# Patient Record
Sex: MALE | Race: WHITE | NOT HISPANIC OR LATINO | Employment: FULL TIME | URBAN - METROPOLITAN AREA
[De-identification: names, ages, dates, MRNs, and addresses within clinical notes are randomized per-mention and may not be internally consistent; named-entity substitution may affect disease eponyms.]

---

## 2017-08-18 ENCOUNTER — APPOINTMENT (OUTPATIENT)
Dept: CT IMAGING | Facility: CLINIC | Age: 42
End: 2017-08-18
Attending: EMERGENCY MEDICINE
Payer: COMMERCIAL

## 2017-08-18 ENCOUNTER — HOSPITAL ENCOUNTER (OUTPATIENT)
Facility: CLINIC | Age: 42
Setting detail: OBSERVATION
Discharge: HOME | End: 2017-08-19
Attending: EMERGENCY MEDICINE | Admitting: INTERNAL MEDICINE
Payer: COMMERCIAL

## 2017-08-18 DIAGNOSIS — Z79.4 ENCOUNTER FOR LONG-TERM (CURRENT) USE OF INSULIN (H): ICD-10-CM

## 2017-08-18 DIAGNOSIS — R73.9 HYPERGLYCEMIA: ICD-10-CM

## 2017-08-18 LAB
ALBUMIN SERPL-MCNC: 3 G/DL (ref 3.4–5)
ALBUMIN UR-MCNC: NEGATIVE MG/DL
ALP SERPL-CCNC: 66 U/L (ref 40–150)
ALT SERPL W P-5'-P-CCNC: 26 U/L (ref 0–70)
ANION GAP SERPL CALCULATED.3IONS-SCNC: 7 MMOL/L (ref 3–14)
APPEARANCE UR: ABNORMAL
AST SERPL W P-5'-P-CCNC: 7 U/L (ref 0–45)
BASOPHILS # BLD AUTO: 0 10E9/L (ref 0–0.2)
BASOPHILS NFR BLD AUTO: 0.4 %
BILIRUB SERPL-MCNC: 0.3 MG/DL (ref 0.2–1.3)
BILIRUB UR QL STRIP: NEGATIVE
BUN SERPL-MCNC: 18 MG/DL (ref 7–30)
CALCIUM SERPL-MCNC: 7.2 MG/DL (ref 8.5–10.1)
CHLORIDE SERPL-SCNC: 109 MMOL/L (ref 94–109)
CO2 BLDCOV-SCNC: 26 MMOL/L (ref 21–28)
CO2 SERPL-SCNC: 25 MMOL/L (ref 20–32)
COLOR UR AUTO: ABNORMAL
CREAT SERPL-MCNC: 0.97 MG/DL (ref 0.66–1.25)
DIFFERENTIAL METHOD BLD: ABNORMAL
EOSINOPHIL # BLD AUTO: 0.1 10E9/L (ref 0–0.7)
EOSINOPHIL NFR BLD AUTO: 0.8 %
ERYTHROCYTE [DISTWIDTH] IN BLOOD BY AUTOMATED COUNT: 14.1 % (ref 10–15)
GFR SERPL CREATININE-BSD FRML MDRD: 84 ML/MIN/1.7M2
GLUCOSE BLDC GLUCOMTR-MCNC: 150 MG/DL (ref 70–99)
GLUCOSE BLDC GLUCOMTR-MCNC: 307 MG/DL (ref 70–99)
GLUCOSE SERPL-MCNC: 256 MG/DL (ref 70–99)
GLUCOSE UR STRIP-MCNC: >1000 MG/DL
HCT VFR BLD AUTO: 42.7 % (ref 40–53)
HGB BLD-MCNC: 14.3 G/DL (ref 13.3–17.7)
HGB UR QL STRIP: NEGATIVE
IMM GRANULOCYTES # BLD: 0 10E9/L (ref 0–0.4)
IMM GRANULOCYTES NFR BLD: 0.2 %
KETONES UR STRIP-MCNC: 40 MG/DL
LACTATE BLD-SCNC: 0.5 MMOL/L (ref 0.7–2.1)
LEUKOCYTE ESTERASE UR QL STRIP: NEGATIVE
LIPASE SERPL-CCNC: 99 U/L (ref 73–393)
LYMPHOCYTES # BLD AUTO: 0.9 10E9/L (ref 0.8–5.3)
LYMPHOCYTES NFR BLD AUTO: 8.4 %
MCH RBC QN AUTO: 28.5 PG (ref 26.5–33)
MCHC RBC AUTO-ENTMCNC: 33.5 G/DL (ref 31.5–36.5)
MCV RBC AUTO: 85 FL (ref 78–100)
MONOCYTES # BLD AUTO: 0.5 10E9/L (ref 0–1.3)
MONOCYTES NFR BLD AUTO: 4.5 %
MUCOUS THREADS #/AREA URNS LPF: PRESENT /LPF
NEUTROPHILS # BLD AUTO: 8.7 10E9/L (ref 1.6–8.3)
NEUTROPHILS NFR BLD AUTO: 85.7 %
NITRATE UR QL: NEGATIVE
NRBC # BLD AUTO: 0 10*3/UL
NRBC BLD AUTO-RTO: 0 /100
PCO2 BLDV: 48 MM HG (ref 40–50)
PH BLDV: 7.35 PH (ref 7.32–7.43)
PH UR STRIP: 5.5 PH (ref 5–7)
PLATELET # BLD AUTO: 271 10E9/L (ref 150–450)
PO2 BLDV: 23 MM HG (ref 25–47)
POTASSIUM SERPL-SCNC: 3.8 MMOL/L (ref 3.4–5.3)
PROT SERPL-MCNC: 5.8 G/DL (ref 6.8–8.8)
RBC # BLD AUTO: 5.02 10E12/L (ref 4.4–5.9)
RBC #/AREA URNS AUTO: 0 /HPF (ref 0–2)
SAO2 % BLDV FROM PO2: 36 %
SODIUM SERPL-SCNC: 140 MMOL/L (ref 133–144)
SOURCE: ABNORMAL
SP GR UR STRIP: 1.02 (ref 1–1.03)
SQUAMOUS #/AREA URNS AUTO: <1 /HPF (ref 0–1)
UROBILINOGEN UR STRIP-MCNC: NORMAL MG/DL (ref 0–2)
WBC # BLD AUTO: 10.1 10E9/L (ref 4–11)
WBC #/AREA URNS AUTO: <1 /HPF (ref 0–2)

## 2017-08-18 PROCEDURE — 25000132 ZZH RX MED GY IP 250 OP 250 PS 637: Performed by: EMERGENCY MEDICINE

## 2017-08-18 PROCEDURE — 83605 ASSAY OF LACTIC ACID: CPT

## 2017-08-18 PROCEDURE — 96375 TX/PRO/DX INJ NEW DRUG ADDON: CPT

## 2017-08-18 PROCEDURE — 74177 CT ABD & PELVIS W/CONTRAST: CPT

## 2017-08-18 PROCEDURE — 96374 THER/PROPH/DIAG INJ IV PUSH: CPT

## 2017-08-18 PROCEDURE — 00000146 ZZHCL STATISTIC GLUCOSE BY METER IP

## 2017-08-18 PROCEDURE — 99285 EMERGENCY DEPT VISIT HI MDM: CPT | Mod: Z6 | Performed by: EMERGENCY MEDICINE

## 2017-08-18 PROCEDURE — 83690 ASSAY OF LIPASE: CPT | Performed by: EMERGENCY MEDICINE

## 2017-08-18 PROCEDURE — 80048 BASIC METABOLIC PNL TOTAL CA: CPT | Performed by: EMERGENCY MEDICINE

## 2017-08-18 PROCEDURE — 83036 HEMOGLOBIN GLYCOSYLATED A1C: CPT | Performed by: EMERGENCY MEDICINE

## 2017-08-18 PROCEDURE — 85025 COMPLETE CBC W/AUTO DIFF WBC: CPT | Performed by: EMERGENCY MEDICINE

## 2017-08-18 PROCEDURE — 25000128 H RX IP 250 OP 636: Performed by: RADIOLOGY

## 2017-08-18 PROCEDURE — 81001 URINALYSIS AUTO W/SCOPE: CPT | Performed by: EMERGENCY MEDICINE

## 2017-08-18 PROCEDURE — 99285 EMERGENCY DEPT VISIT HI MDM: CPT | Mod: 25

## 2017-08-18 PROCEDURE — 80053 COMPREHEN METABOLIC PANEL: CPT | Performed by: EMERGENCY MEDICINE

## 2017-08-18 PROCEDURE — 96361 HYDRATE IV INFUSION ADD-ON: CPT | Mod: 59

## 2017-08-18 PROCEDURE — 87086 URINE CULTURE/COLONY COUNT: CPT | Performed by: EMERGENCY MEDICINE

## 2017-08-18 PROCEDURE — 25000128 H RX IP 250 OP 636: Performed by: EMERGENCY MEDICINE

## 2017-08-18 PROCEDURE — 82803 BLOOD GASES ANY COMBINATION: CPT

## 2017-08-18 RX ORDER — SODIUM CHLORIDE 9 MG/ML
1000 INJECTION, SOLUTION INTRAVENOUS CONTINUOUS
Status: DISCONTINUED | OUTPATIENT
Start: 2017-08-18 | End: 2017-08-18

## 2017-08-18 RX ORDER — ONDANSETRON 2 MG/ML
4 INJECTION INTRAMUSCULAR; INTRAVENOUS EVERY 6 HOURS PRN
Status: DISCONTINUED | OUTPATIENT
Start: 2017-08-18 | End: 2017-08-19 | Stop reason: HOSPADM

## 2017-08-18 RX ORDER — IOPAMIDOL 755 MG/ML
116 INJECTION, SOLUTION INTRAVASCULAR ONCE
Status: COMPLETED | OUTPATIENT
Start: 2017-08-18 | End: 2017-08-18

## 2017-08-18 RX ORDER — LIDOCAINE 40 MG/G
CREAM TOPICAL
Status: DISCONTINUED | OUTPATIENT
Start: 2017-08-18 | End: 2017-08-19 | Stop reason: HOSPADM

## 2017-08-18 RX ADMIN — ONDANSETRON 4 MG: 2 INJECTION INTRAMUSCULAR; INTRAVENOUS at 21:14

## 2017-08-18 RX ADMIN — IOPAMIDOL 116 ML: 755 INJECTION, SOLUTION INTRAVENOUS at 21:20

## 2017-08-18 RX ADMIN — OMEPRAZOLE 20 MG: 20 CAPSULE, DELAYED RELEASE ORAL at 23:39

## 2017-08-18 RX ADMIN — HUMAN INSULIN 6 UNITS: 100 INJECTION, SOLUTION SUBCUTANEOUS at 22:23

## 2017-08-18 RX ADMIN — SODIUM CHLORIDE 1000 ML: 9 INJECTION, SOLUTION INTRAVENOUS at 19:44

## 2017-08-18 RX ADMIN — SODIUM CHLORIDE 1000 ML: 9 INJECTION, SOLUTION INTRAVENOUS at 22:23

## 2017-08-18 ASSESSMENT — ENCOUNTER SYMPTOMS
NECK STIFFNESS: 0
DIFFICULTY URINATING: 0
NAUSEA: 1
DIAPHORESIS: 1
HEADACHES: 1
CONFUSION: 0
SHORTNESS OF BREATH: 0
EYE REDNESS: 0
DIZZINESS: 1
COLOR CHANGE: 0
CHILLS: 1
ARTHRALGIAS: 0
VOMITING: 1
ABDOMINAL PAIN: 1
FEVER: 0

## 2017-08-18 NOTE — IP AVS SNAPSHOT
[facility] [address] Observation [address] [address]    Phone:  [phone]    Fax:  [phone]                                       After Visit Summary   8/18/2017    Israel Bronw    MRN: [id number]           After Visit Summary Signature Page     I have received my discharge instructions, and my questions have been answered. I have discussed any challenges I see with this plan with the nurse or doctor.    ..........................................................................................................................................  Patient/Patient Representative Signature      ..........................................................................................................................................  Patient Representative Print Name and Relationship to Patient    ..................................................               ................................................  Date                                            Time    ..........................................................................................................................................  Reviewed by Signature/Title    ...................................................              ..............................................  Date                                                            Time

## 2017-08-18 NOTE — ED NOTES
Pt comes in with lower abdominal pain that started suddenly this afternoon after an episode of vomiting. Pt says after the episode of vomiting he began to feel sweaty and dizzy, those symptoms have since resolved but pain and nausea persist. Pt also thinks he may be a little constipated. Alert and oriented, vss.

## 2017-08-18 NOTE — ED PROVIDER NOTES
[facility] EMERGENCY DEPARTMENT ([facility])  8/18/17 ED 10 7:05 PM   History     Chief Complaint   Patient presents with     Abdominal Pain     The history is provided by the patient, the spouse and medical records.     Israel Brown is a 41 year old male who presents with lower abdominal pain as well as 1 episode of vomiting and diarrhea. He has a history of diabetes, asthma, GERD, RA, and neuropathy.  Patient states symptoms started at 4pm today. He went to eat at [email or web address] today. Afterwards he developed sudden onset of severe crampy abdominal pain. He went to a gas station bathroom and vomited some pinkish emesis. He did not eat any pink colored food and has not had any further episodes of vomiting. He also had a small amount of soft and watery diarrhea with this, brown normal stool color. He notes that sometimes if he holds his stool or urine he gets abdominal cramping, but had no relief after stooling and urinating. Last bowel movement was after symptoms started, at around 4:30 PM.  The pain is focal in the entire lower abdomen, constant and unrelenting. He rates it a 6.5/10 in intensity, nonradiating; specifically no radiation of pain into groin or thighs. He notes when symptoms first started he had diffuse body sweats for 10-15 minutes, but no fevers, chills. He also has a headache which started a little while ago while here in the Emergency Department. The pain is focal over right temple and forehead, radiating to retro-orbital area. Patient has vertiginous symptoms like room spinning when he sat upright. He has had infrequent headaches like this in the past  but they usually go away.  No blurry vision. Nothing made symptoms better or worse. Wife notes that when he first came here he had some right upper abdominal pain in addition to his lower abdominal pain, but patient states the upper abdominal pain has improved. He is on simvastatin, Lantus. He is status post 2 patellar realignments,  last surgery was in 2003. No history of abdominal surgeries.     I have reviewed the Medications, Allergies, Past Medical and Surgical History, and Social History in the [employer] system.  PAST MEDICAL HISTORY:   Past Medical History:   Diagnosis Date     Asthma      Diabetes mellitus (H)      Neuropathy (H)      RA (rheumatoid arthritis) (H)        PAST SURGICAL HISTORY:   Past Surgical History:   Procedure Laterality Date     REALIGN PATELLA         FAMILY HISTORY: No family history on file.    SOCIAL HISTORY:   Social History   Substance Use Topics     Smoking status: Never Smoker     Smokeless tobacco: Never Used     Alcohol use No       Patient's Medications   New Prescriptions    No medications on file   Previous Medications    ALBUTEROL (PROAIR HFA, PROVENTIL HFA, VENTOLIN HFA) 108 (90 BASE) MCG/ACT INHALER    Inhale 2 puffs into the lungs every 6 hours    ASPIRIN 81 MG TABLET    Take 1 tablet by mouth daily.    FENOFIBRATE PO        FLUTICASONE PROPIONATE (FLONASE NA)    Spray  in nostril.    GABAPENTIN (NEURONTIN) 300 MG CAPSULE    Take 300 mg by mouth 3 times daily.    INSULIN ASPART (NOVOLOG SC)    Inject  Subcutaneous daily.    INSULIN GLARGINE (LANTUS) 100 UNIT/ML PEN    Inject Subcutaneous At Bedtime    METFORMIN HCL PO    Take  by mouth.    MONTELUKAST SODIUM (SINGULAIR PO)        OMEPRAZOLE    daily.    PIOGLITAZONE (ACTOS) 45 MG TABLET    Take by mouth daily    PREGABALIN PO        SIMVASTATIN PO    Take  by mouth.   Modified Medications    No medications on file   Discontinued Medications    No medications on file          Allergies   Allergen Reactions     Cortisone      Hmg-Coa-R Inhibitors      Penicillins       Review of Systems   Constitutional: Positive for chills (resolved) and diaphoresis (resolved). Negative for fever.   HENT: Negative for congestion.    Eyes: Negative for redness.   Respiratory: Negative for shortness of breath.    Cardiovascular: Negative for chest pain.   Gastrointestinal:  Positive for abdominal pain, nausea (resolved) and vomiting (x1).   Genitourinary: Negative for difficulty urinating.   Musculoskeletal: Negative for arthralgias and neck stiffness.   Skin: Negative for color change.   Neurological: Positive for dizziness (when sitting upright) and headaches.   Psychiatric/Behavioral: Negative for confusion.       Physical Exam   BP: 132/74  Heart Rate: 92  Temp: 98.5  F (36.9  C)  Resp: 16  Weight: 86.2 kg (190 lb)  SpO2: 97 %  Physical Exam   Constitutional: No distress.   HENT:   Head: Atraumatic.   Mouth/Throat: Oropharynx is clear and moist. No oropharyngeal exudate.   Eyes: Pupils are equal, round, and reactive to light. No scleral icterus.   Cardiovascular: Normal heart sounds and intact distal pulses.    Pulmonary/Chest: Breath sounds normal. No respiratory distress.   Abdominal: Soft. Bowel sounds are normal. There is no tenderness.   Musculoskeletal: He exhibits no edema or tenderness.   Skin: Skin is warm. No rash noted. He is not diaphoretic.       ED Course     ED Course     Procedures             Critical Care time:  none             Labs Ordered and Resulted from Time of ED Arrival Up to the Time of Departure from the ED - No data to display         Assessments & Plan (with Medical Decision Making)     41 yom with h/o poorly controlled DM due to med non-adherence with acute abd pain and hi fingerstick. Labs revealing +urine ketones but not acidemic. Treated with IVF and regular insulin 6 U IV. Will place in ED OBS for repeat labs in am and diabetic education.      I have reviewed the nursing notes.    I have reviewed the findings, diagnosis, plan and need for follow up with the patient.    New Prescriptions    No medications on file       Final diagnoses:   Hyperglycemia   I, Tracy Peterson am serving as a trained medical scribe to document services personally performed Francesco Zimmerman MD, based on the provider's statements to me on August 18, 2017.  This document has  been checked and approved by the attending provider.    I, Francesco Zimmerman MD, was physically present and have reviewed and verified the accuracy of this note documented by Tracy Peterson, medical scribe.      8/18/2017   [facility], [place], EMERGENCY DEPARTMENT     Francesco Zimmerman MD  08/19/17 0225

## 2017-08-18 NOTE — IP AVS SNAPSHOT
"                  MRN:[id number]                      After Visit Summary   8/18/2017    Israel Brown    MRN: [id number]           Thank you!     Thank you for choosing [facility] for your care. Our goal is always to provide you with excellent care. Hearing back from our patients is one way we can continue to improve our services. Please take a few minutes to complete the written survey that you may receive in the mail after you visit with us. Thank you!        Patient Information     Date Of Birth          1975        Designated Caregiver       Most Recent Value    Caregiver    Will someone help with your care after discharge? yes    Name of designated caregiver Narcisa    Phone number of caregiver [phone]    [place]      About your hospital stay     You were admitted on:  August 19, 2017 You last received care in the:  Unit 6D Observation [facility]    You were discharged on:  August 19, 2017        Reason for your hospital stay       You were admitted with abdominal pain, nausea, vomiting, and elevated blood glucose. You abdominal pain resolved and you were tolerating a regular diet. Your CT A/P showed  \"1. No acute pathology in the abdomen or pelvis. 2. 3 mm hyperdensity in an upper pole calyx of the left kidney is new since 11/14/2016, favored to represent a nonobstructive renal stone.\"  Of note, you will need to hold your Metformin for 48 hours after IV contrast.     Your blood sugar is elevated and you would benefit from better blood glucose control at home. I recommend you follow-up with your PCP to discuss this. You would also benefit from an Endocrinology consult out-patient. This was offered to you here, but after discussion it makes the most sense to do this as an out-patient. In the meantime, continue your home diabetes regimen, but hold metformin for 48 hours after IV contrast.                  Who to Call     For medical emergencies, please call 911.  For non-urgent " questions about your medical care, please call your primary care provider or clinic, [phone]          Attending Provider     Provider Specialty    Francesco Zimmerman MD Emergency Medicine    Gigi Townsend MD Emergency Medicine    Fredi White MD Internal Medicine       Primary Care Provider Office Phone # Fax #    Dawn DEVINE Wesley [phone]       When to contact your care team       Return to the ED with fever, uncontrolled nausea, vomiting, unrelieved pain, bleeding not relieved with pressure, dizziness, chest pain, shortness of breath, loss of consciousness, and any new or concerning symptoms.                  After Care Instructions     Activity       Your activity upon discharge: activity as tolerated            Diet       Follow this diet upon discharge:       Moderate Consistent CHO Diet            Monitor and record       blood glucose 4 times a day, before meals and at bedtime                  Follow-up Appointments     Adult [facility] Follow-up and recommended labs and tests       Follow up with primary care provider, DAWN BOTELLO, within 7 days for hospital follow- up.     Appointments on [place] and/or [facility] (with [facility] or [facility] provider or service). Call [phone] if you haven't heard regarding these appointments within 7 days of discharge.                  Pending Results     Date and Time Order Name Status Description    8/18/2017 1923 Urine Culture Preliminary             Statement of Approval     Ordered          08/19/17 0817  I have reviewed and agree with all the recommendations and orders detailed in this document.  EFFECTIVE NOW     Approved and electronically signed by:  Alesia Garcia APRN CNP             Admission Information     Date & Time Provider Department Dept. Phone    8/18/2017 Fredi White MD Unit [facility]D Observation [facility] [place] [phone]      Your Vitals Were     Blood Pressure Temperature Respirations Weight Pulse Oximetry BMI  "(Body Mass Index)    123/82 (BP Location: Right arm) 98.3  F (36.8  C) (Oral) 16 88.5 kg (195 lb) 97% 32.45 kg/m2      my[employer]ove Information     [employer] lets you send messages to your doctor, view your test results, renew your prescriptions, schedule appointments and more. To sign up, go to [email or web address] . Click on \"Log in\" on the left side of the screen, which will take you to the Welcome page. Then click on \"Sign up Now\" on the right side of the page.     You will be asked to enter the access code listed below, as well as some personal information. Please follow the directions to create your username and password.     Your access code is: [email or web address]  Expires: 2017  8:14 AM     Your access code will  in 90 days. If you need help or a new code, please call your [facility] clinic or [phone].        Care EveryWhere ID     This is your Care EveryWhere ID. This could be used by other organizations to access your [facility] medical records  [id number]        Equal Access to Services     EVA GUEVARA : Hadii lamar Andersen, jennifer crisostomo, qabreonna kaalmauche grijalva, ray hall . So [facility] [phone].    ATENCIÓN: Si habla español, tiene a wolff disposición servicios gratuitos de asistencia lingüística. Anatoliy al [phone].    We comply with applicable federal civil rights laws and [place] laws. We do not discriminate on the basis of race, color, national origin, age, disability sex, sexual orientation or gender identity.               Review of your medicines      CONTINUE these medicines which may have CHANGED, or have new prescriptions. If we are uncertain of the size of tablets/capsules you have at home, strength may be listed as something that might have changed.        Dose / Directions    metFORMIN 500 MG tablet   Commonly known as:  GLUCOPHAGE   This may have changed:  additional instructions        Dose:  1000 mg   Take 2 tablets (1,000 mg) by mouth " daily (with breakfast) HOLD for 48 hours after IV Contrast   Quantity:  60 tablet   Refills:  0         CONTINUE these medicines which have NOT CHANGED        Dose / Directions    albuterol 108 (90 BASE) MCG/ACT Inhaler   Commonly known as:  PROAIR HFA/PROVENTIL HFA/VENTOLIN HFA        Dose:  2 puff   Inhale 2 puffs into the lungs every 6 hours   Refills:  0       aspirin 81 MG tablet        Dose:  1 tablet   Take 1 tablet by mouth daily.   Refills:  0       fenofibrate 145 MG tablet        Dose:  145 mg   Take 145 mg by mouth daily   Refills:  0       fluticasone 50 MCG/ACT spray   Commonly known as:  FLONASE        Dose:  1 spray   Spray 1 spray in nostril daily   Refills:  0       gabapentin 300 MG capsule   Commonly known as:  NEURONTIN        Dose:  300 mg   Take 300 mg by mouth 3 times daily.   Refills:  0       insulin glargine 100 UNIT/ML injection   Commonly known as:  LANTUS        Dose:  60 Units   Inject 60 Units Subcutaneous every morning   Refills:  0       liraglutide 18 MG/3ML soln   Commonly known as:  VICTOZA        Dose:  0.6 mg   Inject 0.6 mg Subcutaneous daily   Refills:  0       montelukast 10 MG tablet   Commonly known as:  SINGULAIR        Dose:  10 mg   Take 10 mg by mouth At Bedtime   Refills:  0       omeprazole 20 MG CR capsule   Commonly known as:  priLOSEC        Dose:  20 mg   Take 20 mg by mouth daily   Refills:  0       simvastatin 40 MG tablet   Commonly known as:  ZOCOR        Dose:  40 mg   Take 40 mg by mouth At Bedtime   Refills:  0                Protect others around you: Learn how to safely use, store and throw away your medicines at www.disposemymeds.org.             Medication List: This is a list of all your medications and when to take them. Check marks below indicate your daily home schedule. Keep this list as a reference.      Medications           Morning Afternoon Evening Bedtime As Needed    albuterol 108 (90 BASE) MCG/ACT Inhaler   Commonly known as:  PROAIR  HFA/PROVENTIL HFA/VENTOLIN HFA   Inhale 2 puffs into the lungs every 6 hours                                aspirin 81 MG tablet   Take 1 tablet by mouth daily.                                fenofibrate 145 MG tablet   Take 145 mg by mouth daily                                fluticasone 50 MCG/ACT spray   Commonly known as:  FLONASE   Spray 1 spray in nostril daily                                gabapentin 300 MG capsule   Commonly known as:  NEURONTIN   Take 300 mg by mouth 3 times daily.                                insulin glargine 100 UNIT/ML injection   Commonly known as:  LANTUS   Inject 60 Units Subcutaneous every morning   Last time this was given:  60 Units on 8/19/2017  8:17 AM                                liraglutide 18 MG/3ML soln   Commonly known as:  VICTOZA   Inject 0.6 mg Subcutaneous daily                                metFORMIN 500 MG tablet   Commonly known as:  GLUCOPHAGE   Take 2 tablets (1,000 mg) by mouth daily (with breakfast) HOLD for 48 hours after IV Contrast                                montelukast 10 MG tablet   Commonly known as:  SINGULAIR   Take 10 mg by mouth At Bedtime                                omeprazole 20 MG CR capsule   Commonly known as:  priLOSEC   Take 20 mg by mouth daily   Last time this was given:  20 mg on 8/18/2017 11:39 PM                                simvastatin 40 MG tablet   Commonly known as:  ZOCOR   Take 40 mg by mouth At Bedtime   Last time this was given:  40 mg on 8/19/2017  8:15 AM                                          More Information        Diabetes with High Blood Sugar  You have been treated for high blood sugar (hyperglycemia). This may be because of an infection or other illness, eating too many sweets or starches, or not taking enough insulin.  Home care  Monitor and write down your blood sugar level at least twice a day. Do this before breakfast and before dinner. If you take insulin, record your routine insulin dose as well. Also  "record any additional doses required based on your sliding scale. Do this for the next 3 to 5 days.  High blood sugar may cause symptoms that you can learn to recognize, such as:    Frequent urination    Thirst    Dizziness    Headache    Shortness of breath    Breath that smells fruity    Nausea or vomiting    Abdominal pain    Drowsiness or loss of consciousness  If you have high-blood-sugar symptoms, use a blood or urine test to find out what your blood sugar level is. If it is above your usual range, use the \"sliding scale\" regular insulin dose prescribed by your healthcare provider. If you were not given a range for your insulin dose, contact your healthcare provider for more advice.  Follow-up care  Follow up with your healthcare provider, or as advised. You may need to meet with your healthcare provider in the next week. You will likely review your blood sugar records together. You may also talk to your provider about adjusting your dose of insulin or other medicine for blood sugar.  When to seek medical advice  Call your healthcare provider right away if these occur:    High blood sugar symptoms (Symptoms are described above.)    Blood sugar over 300 mg/dl If you can t reach your healthcare provider, go to a hospital emergency room or urgent care center  Date Last Reviewed: 6/1/2016 2000-2017 [employer]. [address] [place] [address], [place] [address]. All rights reserved. This information is not intended as a substitute for professional medical care. Always follow your healthcare professional's instructions.             "

## 2017-08-19 VITALS
TEMPERATURE: 98.3 F | SYSTOLIC BLOOD PRESSURE: 123 MMHG | RESPIRATION RATE: 16 BRPM | OXYGEN SATURATION: 97 % | DIASTOLIC BLOOD PRESSURE: 82 MMHG | WEIGHT: 195 LBS | BODY MASS INDEX: 32.45 KG/M2

## 2017-08-19 PROBLEM — R10.9 ABDOMINAL PAIN: Status: ACTIVE | Noted: 2017-08-19

## 2017-08-19 LAB
ALBUMIN SERPL-MCNC: 2.8 G/DL (ref 3.4–5)
ALP SERPL-CCNC: 63 U/L (ref 40–150)
ALT SERPL W P-5'-P-CCNC: 24 U/L (ref 0–70)
ANION GAP SERPL CALCULATED.3IONS-SCNC: 5 MMOL/L (ref 3–14)
ANION GAP SERPL CALCULATED.3IONS-SCNC: 7 MMOL/L (ref 3–14)
AST SERPL W P-5'-P-CCNC: 11 U/L (ref 0–45)
BACTERIA SPEC CULT: NORMAL
BASOPHILS # BLD AUTO: 0 10E9/L (ref 0–0.2)
BASOPHILS NFR BLD AUTO: 0.4 %
BILIRUB DIRECT SERPL-MCNC: <0.1 MG/DL (ref 0–0.2)
BILIRUB SERPL-MCNC: 0.3 MG/DL (ref 0.2–1.3)
BUN SERPL-MCNC: 14 MG/DL (ref 7–30)
BUN SERPL-MCNC: 14 MG/DL (ref 7–30)
CALCIUM SERPL-MCNC: 7.7 MG/DL (ref 8.5–10.1)
CALCIUM SERPL-MCNC: 8.4 MG/DL (ref 8.5–10.1)
CHLORIDE SERPL-SCNC: 109 MMOL/L (ref 94–109)
CHLORIDE SERPL-SCNC: 112 MMOL/L (ref 94–109)
CO2 SERPL-SCNC: 24 MMOL/L (ref 20–32)
CO2 SERPL-SCNC: 26 MMOL/L (ref 20–32)
CREAT SERPL-MCNC: 0.93 MG/DL (ref 0.66–1.25)
CREAT SERPL-MCNC: 0.97 MG/DL (ref 0.66–1.25)
DIFFERENTIAL METHOD BLD: NORMAL
EOSINOPHIL # BLD AUTO: 0.3 10E9/L (ref 0–0.7)
EOSINOPHIL NFR BLD AUTO: 4.8 %
ERYTHROCYTE [DISTWIDTH] IN BLOOD BY AUTOMATED COUNT: 14.1 % (ref 10–15)
GFR SERPL CREATININE-BSD FRML MDRD: 84 ML/MIN/1.7M2
GFR SERPL CREATININE-BSD FRML MDRD: 89 ML/MIN/1.7M2
GLUCOSE BLDC GLUCOMTR-MCNC: 146 MG/DL (ref 70–99)
GLUCOSE BLDC GLUCOMTR-MCNC: 180 MG/DL (ref 70–99)
GLUCOSE SERPL-MCNC: 152 MG/DL (ref 70–99)
GLUCOSE SERPL-MCNC: 220 MG/DL (ref 70–99)
HBA1C MFR BLD: 11.1 % (ref 4.3–6)
HCT VFR BLD AUTO: 43.1 % (ref 40–53)
HGB BLD-MCNC: 14.4 G/DL (ref 13.3–17.7)
IMM GRANULOCYTES # BLD: 0 10E9/L (ref 0–0.4)
IMM GRANULOCYTES NFR BLD: 0.3 %
LYMPHOCYTES # BLD AUTO: 1.4 10E9/L (ref 0.8–5.3)
LYMPHOCYTES NFR BLD AUTO: 20.7 %
Lab: NORMAL
MAGNESIUM SERPL-MCNC: 2.1 MG/DL (ref 1.6–2.3)
MCH RBC QN AUTO: 28.3 PG (ref 26.5–33)
MCHC RBC AUTO-ENTMCNC: 33.4 G/DL (ref 31.5–36.5)
MCV RBC AUTO: 85 FL (ref 78–100)
MONOCYTES # BLD AUTO: 0.5 10E9/L (ref 0–1.3)
MONOCYTES NFR BLD AUTO: 7.2 %
NEUTROPHILS # BLD AUTO: 4.5 10E9/L (ref 1.6–8.3)
NEUTROPHILS NFR BLD AUTO: 66.6 %
NRBC # BLD AUTO: 0 10*3/UL
NRBC BLD AUTO-RTO: 0 /100
PHOSPHATE SERPL-MCNC: 3 MG/DL (ref 2.5–4.5)
PLATELET # BLD AUTO: 247 10E9/L (ref 150–450)
POTASSIUM SERPL-SCNC: 3.5 MMOL/L (ref 3.4–5.3)
POTASSIUM SERPL-SCNC: 3.8 MMOL/L (ref 3.4–5.3)
PROT SERPL-MCNC: 5.7 G/DL (ref 6.8–8.8)
RBC # BLD AUTO: 5.09 10E12/L (ref 4.4–5.9)
SODIUM SERPL-SCNC: 140 MMOL/L (ref 133–144)
SODIUM SERPL-SCNC: 144 MMOL/L (ref 133–144)
SPECIMEN SOURCE: NORMAL
WBC # BLD AUTO: 6.8 10E9/L (ref 4–11)

## 2017-08-19 PROCEDURE — 80048 BASIC METABOLIC PNL TOTAL CA: CPT | Performed by: PHYSICIAN ASSISTANT

## 2017-08-19 PROCEDURE — 25000131 ZZH RX MED GY IP 250 OP 636 PS 637: Performed by: PHYSICIAN ASSISTANT

## 2017-08-19 PROCEDURE — 25000128 H RX IP 250 OP 636: Performed by: PHYSICIAN ASSISTANT

## 2017-08-19 PROCEDURE — 00000146 ZZHCL STATISTIC GLUCOSE BY METER IP

## 2017-08-19 PROCEDURE — 80076 HEPATIC FUNCTION PANEL: CPT | Performed by: PHYSICIAN ASSISTANT

## 2017-08-19 PROCEDURE — 83735 ASSAY OF MAGNESIUM: CPT | Performed by: PHYSICIAN ASSISTANT

## 2017-08-19 PROCEDURE — 99236 HOSP IP/OBS SAME DATE HI 85: CPT | Mod: Z6 | Performed by: EMERGENCY MEDICINE

## 2017-08-19 PROCEDURE — 96361 HYDRATE IV INFUSION ADD-ON: CPT

## 2017-08-19 PROCEDURE — G0378 HOSPITAL OBSERVATION PER HR: HCPCS

## 2017-08-19 PROCEDURE — 99207 ZZC APP CREDIT; MD BILLING SHARED VISIT: CPT | Mod: Z6 | Performed by: PHYSICIAN ASSISTANT

## 2017-08-19 PROCEDURE — 85025 COMPLETE CBC W/AUTO DIFF WBC: CPT | Performed by: PHYSICIAN ASSISTANT

## 2017-08-19 PROCEDURE — 36415 COLL VENOUS BLD VENIPUNCTURE: CPT | Performed by: PHYSICIAN ASSISTANT

## 2017-08-19 PROCEDURE — 84100 ASSAY OF PHOSPHORUS: CPT | Performed by: PHYSICIAN ASSISTANT

## 2017-08-19 PROCEDURE — 96372 THER/PROPH/DIAG INJ SC/IM: CPT

## 2017-08-19 PROCEDURE — 99207 ZZC APP CREDIT; MD BILLING SHARED VISIT: CPT | Mod: Z6 | Performed by: NURSE PRACTITIONER

## 2017-08-19 PROCEDURE — 25000132 ZZH RX MED GY IP 250 OP 250 PS 637: Performed by: PHYSICIAN ASSISTANT

## 2017-08-19 RX ORDER — ACETAMINOPHEN 325 MG/1
650 TABLET ORAL EVERY 4 HOURS PRN
Status: DISCONTINUED | OUTPATIENT
Start: 2017-08-19 | End: 2017-08-19 | Stop reason: HOSPADM

## 2017-08-19 RX ORDER — SODIUM CHLORIDE AND POTASSIUM CHLORIDE 150; 900 MG/100ML; MG/100ML
INJECTION, SOLUTION INTRAVENOUS CONTINUOUS
Status: DISCONTINUED | OUTPATIENT
Start: 2017-08-19 | End: 2017-08-19 | Stop reason: HOSPADM

## 2017-08-19 RX ORDER — GABAPENTIN 300 MG/1
300 CAPSULE ORAL 3 TIMES DAILY PRN
Status: DISCONTINUED | OUTPATIENT
Start: 2017-08-19 | End: 2017-08-19 | Stop reason: HOSPADM

## 2017-08-19 RX ORDER — MONTELUKAST SODIUM 10 MG/1
10 TABLET ORAL AT BEDTIME
COMMUNITY
Start: 2016-02-19 | End: 2024-06-19

## 2017-08-19 RX ORDER — DEXTROSE MONOHYDRATE 25 G/50ML
25-50 INJECTION, SOLUTION INTRAVENOUS
Status: DISCONTINUED | OUTPATIENT
Start: 2017-08-19 | End: 2017-08-19 | Stop reason: HOSPADM

## 2017-08-19 RX ORDER — METOCLOPRAMIDE HYDROCHLORIDE 5 MG/ML
10 INJECTION INTRAMUSCULAR; INTRAVENOUS EVERY 6 HOURS PRN
Status: DISCONTINUED | OUTPATIENT
Start: 2017-08-19 | End: 2017-08-19 | Stop reason: HOSPADM

## 2017-08-19 RX ORDER — ONDANSETRON 2 MG/ML
4 INJECTION INTRAMUSCULAR; INTRAVENOUS EVERY 6 HOURS PRN
Status: DISCONTINUED | OUTPATIENT
Start: 2017-08-19 | End: 2017-08-19 | Stop reason: HOSPADM

## 2017-08-19 RX ORDER — FENOFIBRATE 145 MG/1
145 TABLET, COATED ORAL DAILY
COMMUNITY
Start: 2016-10-18 | End: 2018-01-23

## 2017-08-19 RX ORDER — LIRAGLUTIDE 6 MG/ML
0.6 INJECTION SUBCUTANEOUS DAILY
COMMUNITY
Start: 2017-04-17 | End: 2018-01-23

## 2017-08-19 RX ORDER — NICOTINE POLACRILEX 4 MG
15-30 LOZENGE BUCCAL
Status: DISCONTINUED | OUTPATIENT
Start: 2017-08-19 | End: 2017-08-19 | Stop reason: HOSPADM

## 2017-08-19 RX ORDER — SIMVASTATIN 20 MG
40 TABLET ORAL EVERY MORNING
Status: DISCONTINUED | OUTPATIENT
Start: 2017-08-19 | End: 2017-08-19 | Stop reason: HOSPADM

## 2017-08-19 RX ORDER — LIDOCAINE 40 MG/G
CREAM TOPICAL
Status: DISCONTINUED | OUTPATIENT
Start: 2017-08-19 | End: 2017-08-19 | Stop reason: HOSPADM

## 2017-08-19 RX ORDER — FLUTICASONE PROPIONATE 50 MCG
1 SPRAY, SUSPENSION (ML) NASAL DAILY
COMMUNITY
Start: 2017-04-17 | End: 2024-06-19

## 2017-08-19 RX ORDER — SIMVASTATIN 40 MG
40 TABLET ORAL AT BEDTIME
COMMUNITY
Start: 2016-10-18 | End: 2024-04-17

## 2017-08-19 RX ADMIN — Medication 20 MG: at 08:15

## 2017-08-19 RX ADMIN — SIMVASTATIN 40 MG: 20 TABLET, FILM COATED ORAL at 08:15

## 2017-08-19 RX ADMIN — INSULIN ASPART 2 UNITS: 100 INJECTION, SOLUTION INTRAVENOUS; SUBCUTANEOUS at 08:17

## 2017-08-19 RX ADMIN — POTASSIUM CHLORIDE AND SODIUM CHLORIDE: 900; 150 INJECTION, SOLUTION INTRAVENOUS at 06:13

## 2017-08-19 RX ADMIN — INSULIN GLARGINE 60 UNITS: 100 INJECTION, SOLUTION SUBCUTANEOUS at 08:17

## 2017-08-19 NOTE — PROGRESS NOTES
Patient alert and orieinted x 4. Presented with abdominal pain and hyperglycemia. Denies pain at this time. . Updated PA. /75  Temp 98  F (36.7  C) (Oral)  Resp 16  Wt 86.2 kg (190 lb)  SpO2 96%  BMI 31.62 kg/m2

## 2017-08-19 NOTE — H&P
[facility] ED Observation Admission Note    Chief Complaint   Patient presents with     Abdominal Pain       Assessment/Plan:  1. Abdominal pain, nausea, vomiting diarrhea: episode of abdominal pain, nausea, non bloody emesis x 3 and one episode of loose stool shortly after eating at [employer]. Symptoms are completely resolved here in the Observation Unit. the ED, HR 90's, 's-130's/70's-90's, RR 16, SaO2 94-97% on RA, Temp 98.5. Labs show CMP with Ca 7.2, glucose 256 otherwise normal. CBC normal except abs neutrophil 8.7. Normal lactic acid. Normal VBG. UA with >1000 glucose, 40 ketones. Urine culture sent and pending. CT abdomen pelvis reported no acute pathology; 3mm hyperdensity in upper pole of calyx of the left kidney which is new since 11/14/16. In the ED the patient was given 2L NS bolus, 6 units novolog, prilosec 20mg , and zofran 4mg IV x 1. DDx: AGE, early DKA  - MIVF with NS + 20 meq KCL at 125ml/hr  - Orthostatic vitals  - Zofran, Compazine prn nausea  - Continue Prilosec per home regimen  - ADAT to carbohydrate consistent diet  - Send stool culture  - Enteric isolation  - CBC, BMP, Mag this AM    2. Hyperglycemia: glucose 256 on presentation. In the ED the patient was given 2L NS bolus, 6 units novolog. Hemoglobin A1c 11.1. Admits to being non compliant with home regimen of metformin 1000mg daily, victoza 0.6mg daily, lantus 60 units every morning and SSI with Novolog.  - Continue with Lantus, Novolog, Victoza, Metformin per home regimen  - BG checks TID ac and Qhs  - Carbohydrate Consistent Diet  - Hypoglycemic protocol  - Endocrine consult to address medication regimen and any recommendations  - Diabetes Educator consult      HPI:    Israel Brown is a 41 year old male with a history of DM2, neuropathy, RA, GERD, asthma who presented to the ED with complaints of abdominal pain, nausea, vomiting and diarrhea. Yesterday at about 4pm went to [employer] to eat and shortly after developed severe  abdominal pain following by non bloody emesis and an episode of non bloody loose stool. He had 2 more episodes of emesis upon arrival to the ED. He denies any sick contacts or eating anything suspicious during the day. Currently all his symptoms have resolved. Abdominal pain was described as cramping, generalized and constant. Denies absolutely any pain currently. He had some associated headache and sweats which have resolved. He admits that he is not compliant with his diabetes regimen due to his unusual work schedule and also being unable to swallow any pills. He does not check his blood glucose frequently though when he has checked it they usually run in the 200's. Denies any fever, vision changes, chest pain, cough, leg swelling, abdominal pain, nausea, vomiting, fever, chills, dysuria, hematuria.    In the ED, HR 90's, 's-130's/70's-90's, RR 16, SaO2 94-97% on RA, Temp 98.5. Labs show CMP with Ca 7.2, glucose 256 otherwise normal. CBC normal except abs neutrophil 8.7. UA with >1000 glucose, 40 ketones. Urine culture sent and pending. CT abdomen pelvis reported no acute pathology; 3mm hyperdensity in upper pole of calyx of the left kidney which is new since 11/14/16. In the ED the patient was given 2L NS bolus, 6 units novolog, prilosec 20mg , and zofran 4mg IV x 1.      On admission to the observation unit the patient was stable and just wanted to be left alone.    History:    Past Medical History:   Diagnosis Date     Asthma      Diabetes mellitus (H)      Neuropathy (H)      RA (rheumatoid arthritis) (H)        Past Surgical History:   Procedure Laterality Date     REALIGN PATELLA         No family history on file.    Social History     Social History     Marital status: Significant other     Spouse name: N/A     Number of children: N/A     Years of education: N/A     Occupational History     Not on file.     Social History Main Topics     Smoking status: Never Smoker     Smokeless tobacco: Never Used      Alcohol use No     Drug use: No     Sexual activity: Not on file     Other Topics Concern     Not on file     Social History Narrative         No current facility-administered medications on file prior to encounter.   Current Outpatient Prescriptions on File Prior to Encounter:  gabapentin (NEURONTIN) 300 MG capsule Take 300 mg by mouth 3 times daily.   METFORMIN HCL PO Take 1,000 mg by mouth daily (with breakfast)    [DISCONTINUED] insulin glargine (LANTUS) 100 UNIT/ML PEN Inject Subcutaneous At Bedtime   albuterol (PROAIR HFA, PROVENTIL HFA, VENTOLIN HFA) 108 (90 BASE) MCG/ACT inhaler Inhale 2 puffs into the lungs every 6 hours   [DISCONTINUED] Montelukast Sodium (SINGULAIR PO)    [DISCONTINUED] FENOFIBRATE PO    aspirin 81 MG tablet Take 1 tablet by mouth daily.       Data:    Results for orders placed or performed during the hospital encounter of 08/18/17   CT Abdomen Pelvis w Contrast    Narrative    EXAMINATION: CT ABDOMEN PELVIS W CONTRAST, 8/18/2017 9:34 PM    TECHNIQUE:  Helical CT images from the lung bases through the  symphysis pubis were obtained with IV contrast. Contrast dose: isovue  370    COMPARISON: CT CAP 11/14/2016, CT abdomen/pelvis 11/26/2015    HISTORY: tender RUQ/LUQ/LLQ    FINDINGS:    Abdomen and pelvis: Unchanged ill-defined 1 cm hypodensity in the  inferior right hepatic lobe (series 5 image 148), incompletely  evaluated on this single phase exam. Otherwise normal liver. Normal  spleen, pancreas, decompressed gallbladder, adrenals, right kidney,  and urinary bladder. No hydronephrosis. 3 mm hyperdensity adjacent to  a calyx of the upper pole of the left kidney (series 5 image 178) is  favored to represent a nonobstructing renal calculus, new since prior  exam. No bowel obstruction. Formed stool in the distal small bowel.  Normal appendix in the right lower quadrant. No intraperitoneal free  fluid, free air, pneumatosis, or portal venous gas. No abdominal or  pelvic lymphadenopathy by  size criteria. The major abdominal  vasculature is patent. Minimal atherosclerotic calcification at the  aortic bifurcation.    Lung bases: Minimal atelectasis in the lingula, right middle lobe, and  dependent lung bases. No pleural or pericardial effusion.    Bones and soft tissues: No acute or suspicious osseous abnormality.  Soft tissues are unremarkable.      Impression    IMPRESSION:   1. No acute pathology in the abdomen or pelvis.  2. 3 mm hyperdensity in an upper pole calyx of the left kidney is new  since 11/14/2016, favored to represent a nonobstructive renal stone.      I have personally reviewed the examination and initial interpretation  and I agree with the findings.    RICHARDSON MENSAH MD   CBC with platelets differential   Result Value Ref Range    WBC 10.1 4.0 - 11.0 10e9/L    RBC Count 5.02 4.4 - 5.9 10e12/L    Hemoglobin 14.3 13.3 - 17.7 g/dL    Hematocrit 42.7 40.0 - 53.0 %    MCV 85 78 - 100 fl    MCH 28.5 26.5 - 33.0 pg    MCHC 33.5 31.5 - 36.5 g/dL    RDW 14.1 10.0 - 15.0 %    Platelet Count 271 150 - 450 10e9/L    Diff Method Automated Method     % Neutrophils 85.7 %    % Lymphocytes 8.4 %    % Monocytes 4.5 %    % Eosinophils 0.8 %    % Basophils 0.4 %    % Immature Granulocytes 0.2 %    Nucleated RBCs 0 0 /100    Absolute Neutrophil 8.7 (H) 1.6 - 8.3 10e9/L    Absolute Lymphocytes 0.9 0.8 - 5.3 10e9/L    Absolute Monocytes 0.5 0.0 - 1.3 10e9/L    Absolute Eosinophils 0.1 0.0 - 0.7 10e9/L    Absolute Basophils 0.0 0.0 - 0.2 10e9/L    Abs Immature Granulocytes 0.0 0 - 0.4 10e9/L    Absolute Nucleated RBC 0.0    Comprehensive metabolic panel   Result Value Ref Range    Sodium 140 133 - 144 mmol/L    Potassium 3.8 3.4 - 5.3 mmol/L    Chloride 109 94 - 109 mmol/L    Carbon Dioxide 25 20 - 32 mmol/L    Anion Gap 7 3 - 14 mmol/L    Glucose 256 (H) 70 - 99 mg/dL    Urea Nitrogen 18 7 - 30 mg/dL    Creatinine 0.97 0.66 - 1.25 mg/dL    GFR Estimate 84 >60 mL/min/1.7m2    GFR Estimate If Black >90 >60  mL/min/1.7m2    Calcium 7.2 (L) 8.5 - 10.1 mg/dL    Bilirubin Total 0.3 0.2 - 1.3 mg/dL    Albumin 3.0 (L) 3.4 - 5.0 g/dL    Protein Total 5.8 (L) 6.8 - 8.8 g/dL    Alkaline Phosphatase 66 40 - 150 U/L    ALT 26 0 - 70 U/L    AST 7 0 - 45 U/L   Lipase   Result Value Ref Range    Lipase 99 73 - 393 U/L   UA with Microscopic   Result Value Ref Range    Color Urine Light Yellow     Appearance Urine Slightly Cloudy     Glucose Urine >1000 (A) NEG^Negative mg/dL    Bilirubin Urine Negative NEG^Negative    Ketones Urine 40 (A) NEG^Negative mg/dL    Specific Gravity Urine 1.018 1.003 - 1.035    Blood Urine Negative NEG^Negative    pH Urine 5.5 5.0 - 7.0 pH    Protein Albumin Urine Negative NEG^Negative mg/dL    Urobilinogen mg/dL Normal 0.0 - 2.0 mg/dL    Nitrite Urine Negative NEG^Negative    Leukocyte Esterase Urine Negative NEG^Negative    Source Midstream Urine     WBC Urine <1 0 - 2 /HPF    RBC Urine 0 0 - 2 /HPF    Squamous Epithelial /HPF Urine <1 0 - 1 /HPF    Mucous Urine Present (A) NEG^Negative /LPF   Glucose by meter   Result Value Ref Range    Glucose 307 (H) 70 - 99 mg/dL   Glucose by meter   Result Value Ref Range    Glucose 150 (H) 70 - 99 mg/dL   Basic metabolic panel   Result Value Ref Range    Sodium 144 133 - 144 mmol/L    Potassium 3.5 3.4 - 5.3 mmol/L    Chloride 112 (H) 94 - 109 mmol/L    Carbon Dioxide 26 20 - 32 mmol/L    Anion Gap 5 3 - 14 mmol/L    Glucose 152 (H) 70 - 99 mg/dL    Urea Nitrogen 14 7 - 30 mg/dL    Creatinine 0.97 0.66 - 1.25 mg/dL    GFR Estimate 84 >60 mL/min/1.7m2    GFR Estimate If Black >90 >60 mL/min/1.7m2    Calcium 7.7 (L) 8.5 - 10.1 mg/dL   Glucose by meter   Result Value Ref Range    Glucose 146 (H) 70 - 99 mg/dL   ISTAT gases lactate dina POCT   Result Value Ref Range    Ph Venous 7.35 7.32 - 7.43 pH    PCO2 Venous 48 40 - 50 mm Hg    PO2 Venous 23 (L) 25 - 47 mm Hg    Bicarbonate Venous 26 21 - 28 mmol/L    O2 Sat Venous 36 %    Lactic Acid 0.5 (L) 0.7 - 2.1 mmol/L    Urine Culture   Result Value Ref Range    Specimen Description Midstream Urine     Special Requests Specimen received in preservative     Culture Micro PENDING         ROS:    Review Of Systems  Skin: negative  Eyes: negative  Ears/Nose/Throat: negative  Respiratory: No shortness of breath, dyspnea on exertion, cough, or hemoptysis  Cardiovascular: negative  Gastrointestinal: positive for positive for poor appetite, nausea, vomiting and heartburn, negative for, hematemesis, hematochezia, jaundice, negative for, hematemesis and hematochezia  Genitourinary: negative  Musculoskeletal: negative  Neurologic: negative  Psychiatric: negative  Hematologic/Lymphatic/Immunologic: negative for, chills and fever  Endocrine: negative for  PCP: Dr. Olson    10 point ROS negative other than the symptoms noted above.      Exam:  Vitals:  B/P: 118/75, T: 98, P: Data Unavailable, R: 16    Constitutional: alert, no distress and cooperative  Head: Normocephalic. No masses, lesions, tenderness or abnormalities  Neck: Neck supple. No adenopathy. Thyroid symmetric, normal size,, Carotids without bruits.  ENT: ENT exam normal, no neck nodes or sinus tenderness  Cardiovascular: negative, PMI normal. No lifts, heaves, or thrills. RRR. No murmurs, clicks gallops or rub  Respiratory: negative, Percussion normal. Good diaphragmatic excursion. Lungs clear  Gastrointestinal: Abdomen soft, diffuse tendernes. BS normal. No masses, organomegaly  : Deferred  Musculoskeletal: extremities normal- no gross deformities noted, gait normal and normal muscle tone  Skin: no suspicious lesions or rashes  Neurologic: Gait normal. Reflexes normal and symmetric. Sensation grossly WNL.  Psychiatric: mentation appears normal and affect normal/bright  Hematologic/Lymphatic/Immunologic: normal ant/post cervical, axillary, supraclavicular and inguinal nodes    Consults: endocrine  FEN: carb consistent diet; Na restriction 200g  DVT prophylaxis: none  GI  prophylaxis: zantac  BM prophylaxis: none  Code Status: full  Disposition: home once consults competed, stable vitals    Signed:  [unfilled]  August 19, 2017 at 2:15 AM

## 2017-08-19 NOTE — PLAN OF CARE
Problem: Discharge Planning  Goal: Discharge Planning (Adult, OB, Behavioral, Peds)  Patient alert and orieinted x 4. Presented with abdominal pain and hyperglycemia. Denies pain at this time. . Updated PA. /75  Temp 98  F (36.7  C) (Oral)  Resp 16  Wt 86.2 kg (190 lb)  SpO2 96%  BMI 31.62 kg/m2

## 2017-08-19 NOTE — PLAN OF CARE
Problem: Discharge Planning  Goal: Discharge Planning (Adult, OB, Behavioral, Peds)  Blood Glucose greater than 70 less than 250 on two consecutive readings. Yes. Last .  - Ketones absent from urine. No.  - Tolerating oral intake to maintain hydration. Yes.  - Safe disposition plan has been identified. No. Pending.

## 2017-08-19 NOTE — PLAN OF CARE
Problem: Discharge Planning  Goal: Discharge Planning (Adult, OB, Behavioral, Peds)  Outpatient/Observation goals to be met before discharge home:     Blood Glucose greater than 70 less than 250 on two consecutive readings. Yes. Last .  - Ketones absent from urine. No.  - Tolerating oral intake to maintain hydration. Yes.  - Safe disposition plan has been identified. No. Pending.

## 2017-08-19 NOTE — PROGRESS NOTES
All goals met for discharge. Discharge instructions given to patient and all questions answered. Patient able to verbalize understanding of instructions. Family here to provide ride home. PIV discontinued. All belongings with patient. Patient discharged to home.

## 2017-08-19 NOTE — PROGRESS NOTES
8:04 AM The patient was seen and examined by me and the case was discussed with the BALBINA. We are in agreement with the assessment and plan.  This is a 41-year-old male with a history of insulin dependent diabetes admitted from the emergency Department for multiple issues including poor blood sugar control abdominal pain and diarrhea.  He acknowledges that he does not follow his recommended diabetes regimen.  He does have a primary care provider.  Emergency Department a CT scan showed no acute process likely had a self-limited episode of gastroenteritis no recent antibiotic use.  No indication for keeping him in the hospital for diabetes testing he feels fine this morning and we'll discharge him home.    Physical exam:  General: Sleeping comfortably when we entered the room  Cardiac: Regular rate and rhythm no murmurs rubs or gallops respiratory: Lungs are clear    Assessment and plan: 41-year-old male with poor diabetes management but no evidence for DKA also self-limiting abdominal pain and diarrhea with negative CT scan.  Discharge home to follow up with her primary care provider.  Emphasized the importance of good blood sugar control and frequent blood sugar checks.

## 2017-08-19 NOTE — DISCHARGE SUMMARY
ED Observation Discharge Summary    Israel Brown   MRN# [id number]  Age: 41 year old   YOB: 1975            Date of Admission: 08/18/2017    Date of Discharge: 08/19/2017  Admitting Physician:  Fredi White MD  Discharge Physician: Dr. Kristian MD/ Alesia Garcia CNP        DISCHARGE DIAGNOSIS:   1. Abdominal pain, nausea, vomiting diarrhea; resolved   2. DM type 2, Hyperglycemia; stable     INTERVAL HISTORY: VSS, afebrile. Eating and drinking ok. Abdominal pain resolved. No further diarrhea or emesis. Discussed option for in-patient Endocrinology consult versus follow-up out-patient. He prefers to follow-up out-patient and clinically this is reasonable as there doesn't appear to be an acute need here in the hospital. Feels back to baseline and is ready to discharge to home. Denies fevers, chills, headaches, dizziness,  cough, SOB, wheezing, chest pain/pressure, abdominal pain, N/V, constipation, melena/hematochezia, diarrhea, dysuria, extremity swelling/weakness/numbness/tingling, or signs of active bleeding.        PHYSICAL EXAM:   Blood pressure 118/75, temperature 98  F (36.7  C), temperature source Oral, resp. rate 16, weight 88.5 kg (195 lb), SpO2 96 %.     GENERAL APPEARENCE:  A/O x4. NAD.  SKIN: Clean, dry, and intact.  HEENT/NECK: NCAT. Sclera anicteric, PERRLA, EOMI.  Oral mucosa pink and moist   CARDIOVASCULAR: S1, S2 RRR. No murmurs, rubs, or gallops.   RESPIRATORY: Respiratory effort WNL. CTA  bilaterally without crackles/rales/wheeze   GI: Active BS in all 4 quadrants. Abdomen soft and non-tender. No masses or hepatosplenomegaly.  : Deferred  MUSCULOSKELETAL:  Extremities normal, no gross deformities noted, non-tender to palpation.   PV: 2+ bilateral radial and pedal pulses. No edema noted.   NEURO: CN II-XII grossly intact. Speech normal. Appropriate throughout interview.   Sensation grossly WNL. Finger to nose and rapid alternating movements WDLHEME/LYMPH: No visible  bleeding.   PSYCHIATRIC: Mentation and affect appear normal  VASCULAR ACCESS: CDI without erythema or discharge. Non-tender.    PROCEDURES AND IMAGING:   Results for orders placed or performed during the hospital encounter of 08/18/17 (from the past 24 hour(s))   CBC with platelets differential   Result Value Ref Range    WBC 10.1 4.0 - 11.0 10e9/L    RBC Count 5.02 4.4 - 5.9 10e12/L    Hemoglobin 14.3 13.3 - 17.7 g/dL    Hematocrit 42.7 40.0 - 53.0 %    MCV 85 78 - 100 fl    MCH 28.5 26.5 - 33.0 pg    MCHC 33.5 31.5 - 36.5 g/dL    RDW 14.1 10.0 - 15.0 %    Platelet Count 271 150 - 450 10e9/L    Diff Method Automated Method     % Neutrophils 85.7 %    % Lymphocytes 8.4 %    % Monocytes 4.5 %    % Eosinophils 0.8 %    % Basophils 0.4 %    % Immature Granulocytes 0.2 %    Nucleated RBCs 0 0 /100    Absolute Neutrophil 8.7 (H) 1.6 - 8.3 10e9/L    Absolute Lymphocytes 0.9 0.8 - 5.3 10e9/L    Absolute Monocytes 0.5 0.0 - 1.3 10e9/L    Absolute Eosinophils 0.1 0.0 - 0.7 10e9/L    Absolute Basophils 0.0 0.0 - 0.2 10e9/L    Abs Immature Granulocytes 0.0 0 - 0.4 10e9/L    Absolute Nucleated RBC 0.0    Comprehensive metabolic panel   Result Value Ref Range    Sodium 140 133 - 144 mmol/L    Potassium 3.8 3.4 - 5.3 mmol/L    Chloride 109 94 - 109 mmol/L    Carbon Dioxide 25 20 - 32 mmol/L    Anion Gap 7 3 - 14 mmol/L    Glucose 256 (H) 70 - 99 mg/dL    Urea Nitrogen 18 7 - 30 mg/dL    Creatinine 0.97 0.66 - 1.25 mg/dL    GFR Estimate 84 >60 mL/min/1.7m2    GFR Estimate If Black >90 >60 mL/min/1.7m2    Calcium 7.2 (L) 8.5 - 10.1 mg/dL    Bilirubin Total 0.3 0.2 - 1.3 mg/dL    Albumin 3.0 (L) 3.4 - 5.0 g/dL    Protein Total 5.8 (L) 6.8 - 8.8 g/dL    Alkaline Phosphatase 66 40 - 150 U/L    ALT 26 0 - 70 U/L    AST 7 0 - 45 U/L   Lipase   Result Value Ref Range    Lipase 99 73 - 393 U/L   Hemoglobin A1c   Result Value Ref Range    Hemoglobin A1C 11.1 (H) 4.3 - 6.0 %   UA with Microscopic   Result Value Ref Range    Color Urine  Light Yellow     Appearance Urine Slightly Cloudy     Glucose Urine >1000 (A) NEG^Negative mg/dL    Bilirubin Urine Negative NEG^Negative    Ketones Urine 40 (A) NEG^Negative mg/dL    Specific Gravity Urine 1.018 1.003 - 1.035    Blood Urine Negative NEG^Negative    pH Urine 5.5 5.0 - 7.0 pH    Protein Albumin Urine Negative NEG^Negative mg/dL    Urobilinogen mg/dL Normal 0.0 - 2.0 mg/dL    Nitrite Urine Negative NEG^Negative    Leukocyte Esterase Urine Negative NEG^Negative    Source Midstream Urine     WBC Urine <1 0 - 2 /HPF    RBC Urine 0 0 - 2 /HPF    Squamous Epithelial /HPF Urine <1 0 - 1 /HPF    Mucous Urine Present (A) NEG^Negative /LPF   Urine Culture   Result Value Ref Range    Specimen Description Midstream Urine     Special Requests Specimen received in preservative     Culture Micro PENDING    CT Abdomen Pelvis w Contrast    Narrative    EXAMINATION: CT ABDOMEN PELVIS W CONTRAST, 8/18/2017 9:34 PM    TECHNIQUE:  Helical CT images from the lung bases through the  symphysis pubis were obtained with IV contrast. Contrast dose: isovue  370    COMPARISON: CT CAP 11/14/2016, CT abdomen/pelvis 11/26/2015    HISTORY: tender RUQ/LUQ/LLQ    FINDINGS:    Abdomen and pelvis: Unchanged ill-defined 1 cm hypodensity in the  inferior right hepatic lobe (series 5 image 148), incompletely  evaluated on this single phase exam. Otherwise normal liver. Normal  spleen, pancreas, decompressed gallbladder, adrenals, right kidney,  and urinary bladder. No hydronephrosis. 3 mm hyperdensity adjacent to  a calyx of the upper pole of the left kidney (series 5 image 178) is  favored to represent a nonobstructing renal calculus, new since prior  exam. No bowel obstruction. Formed stool in the distal small bowel.  Normal appendix in the right lower quadrant. No intraperitoneal free  fluid, free air, pneumatosis, or portal venous gas. No abdominal or  pelvic lymphadenopathy by size criteria. The major abdominal  vasculature is patent.  Minimal atherosclerotic calcification at the  aortic bifurcation.    Lung bases: Minimal atelectasis in the lingula, right middle lobe, and  dependent lung bases. No pleural or pericardial effusion.    Bones and soft tissues: No acute or suspicious osseous abnormality.  Soft tissues are unremarkable.      Impression    IMPRESSION:   1. No acute pathology in the abdomen or pelvis.  2. 3 mm hyperdensity in an upper pole calyx of the left kidney is new  since 11/14/2016, favored to represent a nonobstructive renal stone.      I have personally reviewed the examination and initial interpretation  and I agree with the findings.    RICHARDSON MENSAH MD   Glucose by meter   Result Value Ref Range    Glucose 307 (H) 70 - 99 mg/dL   Glucose by meter   Result Value Ref Range    Glucose 150 (H) 70 - 99 mg/dL   Basic metabolic panel   Result Value Ref Range    Sodium 144 133 - 144 mmol/L    Potassium 3.5 3.4 - 5.3 mmol/L    Chloride 112 (H) 94 - 109 mmol/L    Carbon Dioxide 26 20 - 32 mmol/L    Anion Gap 5 3 - 14 mmol/L    Glucose 152 (H) 70 - 99 mg/dL    Urea Nitrogen 14 7 - 30 mg/dL    Creatinine 0.97 0.66 - 1.25 mg/dL    GFR Estimate 84 >60 mL/min/1.7m2    GFR Estimate If Black >90 >60 mL/min/1.7m2    Calcium 7.7 (L) 8.5 - 10.1 mg/dL   ISTAT gases lactate dina POCT   Result Value Ref Range    Ph Venous 7.35 7.32 - 7.43 pH    PCO2 Venous 48 40 - 50 mm Hg    PO2 Venous 23 (L) 25 - 47 mm Hg    Bicarbonate Venous 26 21 - 28 mmol/L    O2 Sat Venous 36 %    Lactic Acid 0.5 (L) 0.7 - 2.1 mmol/L   Glucose by meter   Result Value Ref Range    Glucose 146 (H) 70 - 99 mg/dL   Basic metabolic panel   Result Value Ref Range    Sodium 140 133 - 144 mmol/L    Potassium 3.8 3.4 - 5.3 mmol/L    Chloride 109 94 - 109 mmol/L    Carbon Dioxide 24 20 - 32 mmol/L    Anion Gap 7 3 - 14 mmol/L    Glucose 220 (H) 70 - 99 mg/dL    Urea Nitrogen 14 7 - 30 mg/dL    Creatinine 0.93 0.66 - 1.25 mg/dL    GFR Estimate 89 >60 mL/min/1.7m2    GFR Estimate If  Black >90 >60 mL/min/1.7m2    Calcium 8.4 (L) 8.5 - 10.1 mg/dL   CBC with platelets differential   Result Value Ref Range    WBC 6.8 4.0 - 11.0 10e9/L    RBC Count 5.09 4.4 - 5.9 10e12/L    Hemoglobin 14.4 13.3 - 17.7 g/dL    Hematocrit 43.1 40.0 - 53.0 %    MCV 85 78 - 100 fl    MCH 28.3 26.5 - 33.0 pg    MCHC 33.4 31.5 - 36.5 g/dL    RDW 14.1 10.0 - 15.0 %    Platelet Count 247 150 - 450 10e9/L    Diff Method Automated Method     % Neutrophils 66.6 %    % Lymphocytes 20.7 %    % Monocytes 7.2 %    % Eosinophils 4.8 %    % Basophils 0.4 %    % Immature Granulocytes 0.3 %    Nucleated RBCs 0 0 /100    Absolute Neutrophil 4.5 1.6 - 8.3 10e9/L    Absolute Lymphocytes 1.4 0.8 - 5.3 10e9/L    Absolute Monocytes 0.5 0.0 - 1.3 10e9/L    Absolute Eosinophils 0.3 0.0 - 0.7 10e9/L    Absolute Basophils 0.0 0.0 - 0.2 10e9/L    Abs Immature Granulocytes 0.0 0 - 0.4 10e9/L    Absolute Nucleated RBC 0.0    Magnesium   Result Value Ref Range    Magnesium 2.1 1.6 - 2.3 mg/dL   Phosphorus   Result Value Ref Range    Phosphorus 3.0 2.5 - 4.5 mg/dL   Hepatic panel   Result Value Ref Range    Bilirubin Direct <0.1 0.0 - 0.2 mg/dL    Bilirubin Total 0.3 0.2 - 1.3 mg/dL    Albumin 2.8 (L) 3.4 - 5.0 g/dL    Protein Total 5.7 (L) 6.8 - 8.8 g/dL    Alkaline Phosphatase 63 40 - 150 U/L    ALT 24 0 - 70 U/L    AST 11 0 - 45 U/L     DISCHARGE MEDICATIONS:   Current Discharge Medication List      CONTINUE these medications which have CHANGED    Details   metFORMIN (GLUCOPHAGE) 500 MG tablet Take 2 tablets (1,000 mg) by mouth daily (with breakfast) HOLD for 48 hours after IV Contrast  Qty: 60 tablet         CONTINUE these medications which have NOT CHANGED    Details   liraglutide (VICTOZA) 18 MG/3ML soln Inject 0.6 mg Subcutaneous daily      fenofibrate 145 MG tablet Take 145 mg by mouth daily      fluticasone (FLONASE) 50 MCG/ACT spray Spray 1 spray in nostril daily      insulin glargine (LANTUS) 100 UNIT/ML injection Inject 60 Units  "Subcutaneous every morning      montelukast (SINGULAIR) 10 MG tablet Take 10 mg by mouth At Bedtime      omeprazole (PRILOSEC) 20 MG CR capsule Take 20 mg by mouth daily      simvastatin (ZOCOR) 40 MG tablet Take 40 mg by mouth At Bedtime      gabapentin (NEURONTIN) 300 MG capsule Take 300 mg by mouth 3 times daily.      albuterol (PROAIR HFA, PROVENTIL HFA, VENTOLIN HFA) 108 (90 BASE) MCG/ACT inhaler Inhale 2 puffs into the lungs every 6 hours      aspirin 81 MG tablet Take 1 tablet by mouth daily.               CONSULTATIONS:   Consultation during this admission received from:  N/A   BRIEF HISTORY OF PRESENT ILLNESS:   (Adopted from admission H&P).    \"Israel Brown is a 41 year old male with a history of DM2, neuropathy, RA, GERD, asthma who presented to the ED with complaints of abdominal pain, nausea, vomiting and diarrhea. Yesterday at about 4pm went to [facility] to eat and shortly after developed severe abdominal pain following by non bloody emesis and an episode of non bloody loose stool. He had 2 more episodes of emesis upon arrival to the ED. He denies any sick contacts or eating anything suspicious during the day. Currently all his symptoms have resolved. Abdominal pain was described as cramping, generalized and constant. Denies absolutely any pain currently. He had some associated headache and sweats which have resolved. He admits that he is not compliant with his diabetes regimen due to his unusual work schedule and also being unable to swallow any pills. He does not check his blood glucose frequently though when he has checked it they usually run in the 200's. Denies any fever, vision changes, chest pain, cough, leg swelling, abdominal pain, nausea, vomiting, fever, chills, dysuria, hematuria.     In the ED, HR 90's, 's-130's/70's-90's, RR 16, SaO2 94-97% on RA, Temp 98.5. Labs show CMP with Ca 7.2, glucose 256 otherwise normal. CBC normal except abs neutrophil 8.7. UA with >1000 " "glucose, 40 ketones. Urine culture sent and pending. CT abdomen pelvis reported no acute pathology; 3mm hyperdensity in upper pole of calyx of the left kidney which is new since 11/14/16. In the ED the patient was given 2L NS bolus, 6 units novolog, prilosec 20mg , and zofran 4mg IV x 1.                    On admission to the observation unit the patient was stable and just wanted to be left alone.\"    ED OBSERVATION COURSE: Israel Brown is a 41 year old male with a history of DM2, neuropathy, RA, GERD, asthma who presented to the ED with complaints of abdominal pain, nausea, vomiting and diarrhea.    1. Abdominal pain, nausea, vomiting diarrhea: Resolved. Presented to the ED with episode of abdominal pain, nausea, non bloody emesis x 3 and one episode of loose stool shortly after eating at [employer]. Symptoms are completely resolved here in the Observation Unit. the ED, HR 90's, 's-130's/70's-90's, RR 16, SaO2 94-97% on RA, Temp 98.5. Labs show CMP with Ca 7.2, glucose 256 otherwise normal. CBC normal except abs neutrophil 8.7. Normal lactic acid. Normal VBG. UA with >1000 glucose, 40 ketones. Urine culture sent and pending. CT abdomen pelvis reported no acute pathology; 3mm hyperdensity in upper pole of calyx of the left kidney which is new since 11/14/16. In the ED the patient was given 2L NS bolus, 6 units novolog, prilosec 20mg , and zofran 4mg IV x 1. While in observation he was treated with IVF. He is eating a regular diet upon discharge. Stool culture was ordered but was not collected as his symptoms resolved. I suspect this was a self limiting gastroenteritis.        2. Hyperglycemia: glucose 256 on presentation. In the ED the patient was given 2L NS bolus, 6 units novolog. Hemoglobin A1c 11.1. Admits to being non compliant with home regimen of metformin 1000mg daily, victoza 0.6mg daily, lantus 60 units every morning and SSI with Novolog. -146 during admission. Suspect that he does " "require better diabetes management. However, discussed in-patient Endocrinology consult versus out-patient PCP follow-up. Recommend PCP follow-up as he is not in DKA and there does not appear to be an acute need at this time. He will continue his home regimen with frequent BG checks and follow-up with his PCP early next week. He will hold his metformin for 48 hours after IV contrast was given.  Continue with Lantus, Novolog, Victoza, per home regimen. He would benefit from further Diabetes Education. However, this service is not in house on the weekend and I recommend he follow-up in clinic to discuss this.          DISCHARGE DISPOSITION:   Discharged to home. The patient was discharged in a stable condition.     DISCHARGE INSTRUCTIONS AND FOLLOW-UP:    Discharge Procedure Orders  Adult [facility] Follow-up and recommended labs and tests   Order Comments: Follow up with primary care provider, DAWN BOTELLO, within 7 days for hospital follow- up.     Appointments on [place] and/or [facility] (with [facility] or [facility] provider or service). Call [phone] if you haven't heard regarding these appointments within 7 days of discharge.     Activity   Order Comments: Your activity upon discharge: activity as tolerated   Order Specific Question Answer Comments   Is discharge order? Yes      Monitor and record   Order Comments: blood glucose 4 times a day, before meals and at bedtime     When to contact your care team   Order Comments: Return to the ED with fever, uncontrolled nausea, vomiting, unrelieved pain, bleeding not relieved with pressure, dizziness, chest pain, shortness of breath, loss of consciousness, and any new or concerning symptoms.     Reason for your hospital stay   Order Comments: You were admitted with abdominal pain, nausea, vomiting, and elevated blood glucose. You abdominal pain resolved and you were tolerating a regular diet. Your CT A/P showed  \"1. No acute pathology in the abdomen or pelvis. 2. 3 " "mm hyperdensity in an upper pole calyx of the left kidney is new since 11/14/2016, favored to represent a nonobstructive renal stone.\"  Of note, you will need to hold your Metformin for 48 hours after IV contrast.     Your blood sugar is elevated and you would benefit from better blood glucose control at home. I recommend you follow-up with your PCP to discuss this. You would also benefit from an Endocrinology consult out-patient. This was offered to you here, but after discussion it makes the most sense to do this as an out-patient.     Diet   Order Comments: Follow this diet upon discharge:      Moderate Consistent CHO Diet   Order Specific Question Answer Comments   Is discharge order? Yes         Attestation:   I have reviewed today's vital signs, notes, medications, labs and imaging.      IFEANYI Morse, CNP  Nurse Practitioner  Emergency Department Observation Unit      "

## 2018-01-23 ENCOUNTER — HOSPITAL ENCOUNTER (OUTPATIENT)
Facility: CLINIC | Age: 43
Setting detail: OBSERVATION
Discharge: HOME | End: 2018-01-24
Attending: EMERGENCY MEDICINE | Admitting: EMERGENCY MEDICINE
Payer: COMMERCIAL

## 2018-01-23 ENCOUNTER — APPOINTMENT (OUTPATIENT)
Dept: CT IMAGING | Facility: CLINIC | Age: 43
End: 2018-01-23
Attending: EMERGENCY MEDICINE

## 2018-01-23 ENCOUNTER — APPOINTMENT (OUTPATIENT)
Dept: GENERAL RADIOLOGY | Facility: CLINIC | Age: 43
End: 2018-01-23
Attending: EMERGENCY MEDICINE

## 2018-01-23 DIAGNOSIS — R10.30 LOWER ABDOMINAL PAIN: ICD-10-CM

## 2018-01-23 DIAGNOSIS — R06.09 DYSPNEA ON EXERTION: ICD-10-CM

## 2018-01-23 DIAGNOSIS — R07.9 ACUTE CHEST PAIN: ICD-10-CM

## 2018-01-23 LAB
ALBUMIN SERPL-MCNC: 3.2 G/DL (ref 3.4–5)
ALP SERPL-CCNC: 72 U/L (ref 40–150)
ALT SERPL W P-5'-P-CCNC: 45 U/L (ref 0–70)
ANION GAP SERPL CALCULATED.3IONS-SCNC: 10 MMOL/L (ref 3–14)
AST SERPL W P-5'-P-CCNC: 14 U/L (ref 0–45)
BASOPHILS # BLD AUTO: 0 10E9/L (ref 0–0.2)
BASOPHILS NFR BLD AUTO: 0.3 %
BILIRUB SERPL-MCNC: 0.6 MG/DL (ref 0.2–1.3)
BUN SERPL-MCNC: 22 MG/DL (ref 7–30)
CALCIUM SERPL-MCNC: 7.8 MG/DL (ref 8.5–10.1)
CHLORIDE SERPL-SCNC: 105 MMOL/L (ref 94–109)
CO2 SERPL-SCNC: 24 MMOL/L (ref 20–32)
CREAT SERPL-MCNC: 1.13 MG/DL (ref 0.66–1.25)
D DIMER PPP FEU-MCNC: 0.5 UG/ML FEU (ref 0–0.5)
DIFFERENTIAL METHOD BLD: NORMAL
EOSINOPHIL # BLD AUTO: 0.2 10E9/L (ref 0–0.7)
EOSINOPHIL NFR BLD AUTO: 2.3 %
ERYTHROCYTE [DISTWIDTH] IN BLOOD BY AUTOMATED COUNT: 14.2 % (ref 10–15)
FLUAV+FLUBV AG SPEC QL: NEGATIVE
FLUAV+FLUBV AG SPEC QL: NEGATIVE
GFR SERPL CREATININE-BSD FRML MDRD: 70 ML/MIN/1.7M2
GLUCOSE SERPL-MCNC: 296 MG/DL (ref 70–99)
HCT VFR BLD AUTO: 50 % (ref 40–53)
HGB BLD-MCNC: 16.3 G/DL (ref 13.3–17.7)
IMM GRANULOCYTES # BLD: 0 10E9/L (ref 0–0.4)
IMM GRANULOCYTES NFR BLD: 0.3 %
LIPASE SERPL-CCNC: 87 U/L (ref 73–393)
LYMPHOCYTES # BLD AUTO: 1 10E9/L (ref 0.8–5.3)
LYMPHOCYTES NFR BLD AUTO: 14.6 %
MCH RBC QN AUTO: 27.9 PG (ref 26.5–33)
MCHC RBC AUTO-ENTMCNC: 32.6 G/DL (ref 31.5–36.5)
MCV RBC AUTO: 86 FL (ref 78–100)
MONOCYTES # BLD AUTO: 0.3 10E9/L (ref 0–1.3)
MONOCYTES NFR BLD AUTO: 5.2 %
NEUTROPHILS # BLD AUTO: 5.1 10E9/L (ref 1.6–8.3)
NEUTROPHILS NFR BLD AUTO: 77.3 %
NRBC # BLD AUTO: 0 10*3/UL
NRBC BLD AUTO-RTO: 0 /100
NT-PROBNP SERPL-MCNC: 88 PG/ML (ref 0–450)
PLATELET # BLD AUTO: 229 10E9/L (ref 150–450)
POTASSIUM SERPL-SCNC: 3.6 MMOL/L (ref 3.4–5.3)
PROT SERPL-MCNC: 6.9 G/DL (ref 6.8–8.8)
RBC # BLD AUTO: 5.84 10E12/L (ref 4.4–5.9)
SODIUM SERPL-SCNC: 138 MMOL/L (ref 133–144)
SPECIMEN SOURCE: NORMAL
TROPONIN I SERPL-MCNC: <0.015 UG/L (ref 0–0.04)
WBC # BLD AUTO: 6.6 10E9/L (ref 4–11)

## 2018-01-23 PROCEDURE — 85025 COMPLETE CBC W/AUTO DIFF WBC: CPT | Performed by: EMERGENCY MEDICINE

## 2018-01-23 PROCEDURE — 25000128 H RX IP 250 OP 636: Performed by: RADIOLOGY

## 2018-01-23 PROCEDURE — 81001 URINALYSIS AUTO W/SCOPE: CPT | Performed by: EMERGENCY MEDICINE

## 2018-01-23 PROCEDURE — 74177 CT ABD & PELVIS W/CONTRAST: CPT

## 2018-01-23 PROCEDURE — 99285 EMERGENCY DEPT VISIT HI MDM: CPT | Mod: 25 | Performed by: EMERGENCY MEDICINE

## 2018-01-23 PROCEDURE — 80053 COMPREHEN METABOLIC PANEL: CPT | Performed by: EMERGENCY MEDICINE

## 2018-01-23 PROCEDURE — 96361 HYDRATE IV INFUSION ADD-ON: CPT | Mod: 59

## 2018-01-23 PROCEDURE — 71046 X-RAY EXAM CHEST 2 VIEWS: CPT

## 2018-01-23 PROCEDURE — 84484 ASSAY OF TROPONIN QUANT: CPT | Performed by: EMERGENCY MEDICINE

## 2018-01-23 PROCEDURE — 83880 ASSAY OF NATRIURETIC PEPTIDE: CPT | Performed by: EMERGENCY MEDICINE

## 2018-01-23 PROCEDURE — 83690 ASSAY OF LIPASE: CPT | Performed by: EMERGENCY MEDICINE

## 2018-01-23 PROCEDURE — 93005 ELECTROCARDIOGRAM TRACING: CPT

## 2018-01-23 PROCEDURE — 25000128 H RX IP 250 OP 636: Performed by: EMERGENCY MEDICINE

## 2018-01-23 PROCEDURE — 99285 EMERGENCY DEPT VISIT HI MDM: CPT | Mod: 25

## 2018-01-23 PROCEDURE — 25000125 ZZHC RX 250: Performed by: RADIOLOGY

## 2018-01-23 PROCEDURE — 96374 THER/PROPH/DIAG INJ IV PUSH: CPT

## 2018-01-23 PROCEDURE — 85379 FIBRIN DEGRADATION QUANT: CPT | Performed by: EMERGENCY MEDICINE

## 2018-01-23 PROCEDURE — 93010 ELECTROCARDIOGRAM REPORT: CPT | Mod: Z6 | Performed by: EMERGENCY MEDICINE

## 2018-01-23 PROCEDURE — 87804 INFLUENZA ASSAY W/OPTIC: CPT | Performed by: EMERGENCY MEDICINE

## 2018-01-23 RX ORDER — IOPAMIDOL 755 MG/ML
122 INJECTION, SOLUTION INTRAVASCULAR ONCE
Status: COMPLETED | OUTPATIENT
Start: 2018-01-23 | End: 2018-01-23

## 2018-01-23 RX ADMIN — IOPAMIDOL 122 ML: 755 INJECTION, SOLUTION INTRAVENOUS at 22:20

## 2018-01-23 RX ADMIN — SODIUM CHLORIDE, PRESERVATIVE FREE 80 ML: 5 INJECTION INTRAVENOUS at 22:20

## 2018-01-23 RX ADMIN — SODIUM CHLORIDE 1000 ML: 9 INJECTION, SOLUTION INTRAVENOUS at 22:04

## 2018-01-23 ASSESSMENT — ENCOUNTER SYMPTOMS
SHORTNESS OF BREATH: 1
ABDOMINAL PAIN: 1
CONFUSION: 0
FEVER: 0
ARTHRALGIAS: 0
COLOR CHANGE: 0
NAUSEA: 1
DIFFICULTY URINATING: 0
HEADACHES: 0
NECK STIFFNESS: 0
EYE REDNESS: 0

## 2018-01-23 NOTE — IP AVS SNAPSHOT
Unit 6D Observation [address] [address]    Phone:  [phone]    Fax:  [phone]                                       After Visit Summary   1/23/2018    Israel Brown    MRN: [id number]           After Visit Summary Signature Page     I have received my discharge instructions, and my questions have been answered. I have discussed any challenges I see with this plan with the nurse or doctor.    ..........................................................................................................................................  Patient/Patient Representative Signature      ..........................................................................................................................................  Patient Representative Print Name and Relationship to Patient    ..................................................               ................................................  Date                                            Time    ..........................................................................................................................................  Reviewed by Signature/Title    ...................................................              ..............................................  Date                                                            Time

## 2018-01-23 NOTE — IP AVS SNAPSHOT
MRN:[id number]                      After Visit Summary   1/23/2018    Israel Brown    MRN: [id number]           Thank you!     Thank you for choosing [facility] for your care. Our goal is always to provide you with excellent care. Hearing back from our patients is one way we can continue to improve our services. Please take a few minutes to complete the written survey that you may receive in the mail after you visit with us. Thank you!        Patient Information     Date Of Birth          1975        Designated Caregiver       Most Recent Value    Caregiver    Will someone help with your care after discharge? yes    Name of designated caregiver Ellie    Phone number of caregiver [phone]    Caregiver address [address] [place] [address]      About your hospital stay     You were admitted on:  January 24, 2018 You last received care in the:  Unit 6D Observation [facility]    You were discharged on:  January 24, 2018        Reason for your hospital stay       Chest pain                  Who to Call     For medical emergencies, please call 911.  For non-urgent questions about your medical care, please call your primary care provider or clinic, [phone]          Attending Provider     Provider Specialty    Johnny Mcwilliams MD Emergency Medicine    Bucky Wright MD Family Practice       Primary Care Provider Office Phone # Fax #    Edmund DEVINE Wesley [phone]       When to contact your care team       Please go to your nearest emergency room If you were to have a change in the type of chest pain i.e more severe, lasting longer or radiating to your shoulder, arm, neck, jaw or back, shortness of breath or increased pain with breathing, coughing up blood, feel dizzy or lightheaded, or notice swelling in one leg.                  After Care Instructions     Activity       Your activity upon discharge: activity as tolerated            Diet       Follow this  "diet upon discharge: Low carbohydrate            Discharge Instructions       You were admitted for chest pain. Troponins (a lab test to check if there were damage to the heart tissue) were checked and these were negative. You cardiac stress was also negative. The chest pain you came into the emergency room for does not appear to be cardiac chest pain. I recommend continuing your home medications and it will be very important to follow up with your primary care doctor early next week. Continue to drink plenty of fluids.                  Follow-up Appointments     Follow Up and recommended labs and tests       Follow up with primary care provider                  Pending Results     No orders found for last 3 day(s).            Statement of Approval     Ordered          01/24/18 4420  I have reviewed and agree with all the recommendations and orders detailed in this document.  EFFECTIVE NOW     Approved and electronically signed by:  Deirdre Nice PA-C             Admission Information     Date & Time Provider Department Dept. Phone    1/23/2018 Bucky Wright MD Unit 6D Observation [facility] [place] [phone]      Your Vitals Were     Blood Pressure Pulse Temperature Respirations Height Weight    122/79 (BP Location: Left arm) 71 98  F (36.7  C) (Oral) 16 1.651 m (5' 5\") 85.5 kg (188 lb 9.6 oz)    Pulse Oximetry BMI (Body Mass Index)                97% 31.38 kg/m2          Optimus Information     Optimus lets you send messages to your doctor, view your test results, renew your prescriptions, schedule appointments and more. To sign up, go to [email or web address] . Click on \"Log in\" on the left side of the screen, which will take you to the Welcome page. Then click on \"Sign up Now\" on the right side of the page.     You will be asked to enter the access code listed below, as well as some personal information. Please follow the directions to create your username and password.     Your access code is: " [email or web address]  Expires: 2018  4:54 PM     Your access code will  in 90 days. If you need help or a new code, please call your [place] clinic or [phone].        Care EveryWhere ID     This is your Care EveryWhere ID. This could be used by other organizations to access your [facility] medical records  [id number]        Equal Access to Services     [facility] Center[facility] : Hadii aad ku hadasho Soomaali, waaxda luqadaha, qaybta kaalmada adeegyada, waxay idiin hayjazmynen ademarylou mckeon laMontezdaysi . So [facility] [phone].    ATENCIÓN: Si habla español, tiene a wolff disposición servicios gratuitos de asistencia lingüística. Llame al [phone].    We comply with applicable federal civil rights laws and [place] laws. We do not discriminate on the basis of race, color, national origin, age, disability, sex, sexual orientation, or gender identity.               Review of your medicines      UNREVIEWED medicines. Ask your doctor about these medicines        Dose / Directions    fluticasone 50 MCG/ACT spray   Commonly known as:  FLONASE        Dose:  1 spray   Spray 1 spray in nostril daily   Refills:  0       omeprazole 20 MG CR capsule   Commonly known as:  priLOSEC        Dose:  20 mg   Take 20 mg by mouth daily   Refills:  0         CONTINUE these medicines which have NOT CHANGED        Dose / Directions    albuterol 108 (90 BASE) MCG/ACT Inhaler   Commonly known as:  PROAIR HFA/PROVENTIL HFA/VENTOLIN HFA        Dose:  2 puff   Inhale 2 puffs into the lungs every 6 hours   Refills:  0       insulin glargine 100 UNIT/ML injection   Commonly known as:  LANTUS        Dose:  60 Units   Inject 60 Units Subcutaneous every morning   Refills:  0       LYRICA PO        Refills:  0       montelukast 10 MG tablet   Commonly known as:  SINGULAIR        Dose:  10 mg   Take 10 mg by mouth At Bedtime   Refills:  0       simvastatin 40 MG tablet   Commonly known as:  ZOCOR        Dose:  40 mg   Take 40 mg by mouth At Bedtime   Refills:  0                 Protect others around you: Learn how to safely use, store and throw away your medicines at www.disposemymeds.org.             Medication List: This is a list of all your medications and when to take them. Check marks below indicate your daily home schedule. Keep this list as a reference.      Medications           Morning Afternoon Evening Bedtime As Needed    albuterol 108 (90 BASE) MCG/ACT Inhaler   Commonly known as:  PROAIR HFA/PROVENTIL HFA/VENTOLIN HFA   Inhale 2 puffs into the lungs every 6 hours   Last time this was given:  2 puffs on 1/24/2018  5:10 AM                                fluticasone 50 MCG/ACT spray   Commonly known as:  FLONASE   Spray 1 spray in nostril daily                                insulin glargine 100 UNIT/ML injection   Commonly known as:  LANTUS   Inject 60 Units Subcutaneous every morning   Last time this was given:  30 Units on 1/24/2018  8:21 AM                                LYRICA PO                                montelukast 10 MG tablet   Commonly known as:  SINGULAIR   Take 10 mg by mouth At Bedtime                                omeprazole 20 MG CR capsule   Commonly known as:  priLOSEC   Take 20 mg by mouth daily                                simvastatin 40 MG tablet   Commonly known as:  ZOCOR   Take 40 mg by mouth At Bedtime                                          More Information         *CHEST PAIN, UNCERTAIN CAUSE    Based on your exam today, the exact cause of your chest pain is not certain. Your condition does not seem serious at this time, and your pain does not appear to be coming from your heart. However, sometimes the signs of a serious problem take more time to appear. Therefore, watch for the warning signs listed below.  HOME CARE:  1. Rest today and avoid strenuous activity.  2. Take any prescribed medicine as directed.  FOLLOW UP with your doctor in 1-3 days.   GET PROMPT MEDICAL ATTENTION if any of the following occur:    A change in the type  of pain: if it feels different, becomes more severe, lasts longer, or begins to spread into your shoulder, arm, neck, jaw or back    Shortness of breath or increased pain with breathing    Weakness, dizziness, or fainting    Cough with blood or dark colored sputum (phlegm)    Fever over 101  F (38.3  C)    Swelling, pain or redness in one leg    2259-6503 [employer]. [address] [place] [address]. All rights reserved. This information is not intended as a substitute for professional medical care. Always follow your healthcare professional's instructions.  This information has been modified by your health care provider with permission from the publisher.

## 2018-01-23 NOTE — ED NOTES
42 year old male with history of diabetes presents with complaints of tachycardia and BROWN since this morning.  Patient states he has also noted a headache for 3 days and is noted to have low grade fever. Patient does have abdominal pain and patient notes nausea, but no vomiting

## 2018-01-24 ENCOUNTER — TRANSFERRED RECORDS (OUTPATIENT)
Dept: CARDIOLOGY | Facility: CLINIC | Age: 43
End: 2018-01-24

## 2018-01-24 ENCOUNTER — APPOINTMENT (OUTPATIENT)
Dept: NUCLEAR MEDICINE | Facility: CLINIC | Age: 43
End: 2018-01-24
Attending: NURSE PRACTITIONER

## 2018-01-24 VITALS
DIASTOLIC BLOOD PRESSURE: 79 MMHG | HEIGHT: 65 IN | HEART RATE: 71 BPM | TEMPERATURE: 98 F | SYSTOLIC BLOOD PRESSURE: 122 MMHG | BODY MASS INDEX: 31.42 KG/M2 | OXYGEN SATURATION: 97 % | WEIGHT: 188.6 LBS | RESPIRATION RATE: 16 BRPM

## 2018-01-24 LAB
ALBUMIN SERPL-MCNC: 2.6 G/DL (ref 3.4–5)
ALBUMIN UR-MCNC: 10 MG/DL
ALP SERPL-CCNC: 60 U/L (ref 40–150)
ALT SERPL W P-5'-P-CCNC: 36 U/L (ref 0–70)
ANION GAP SERPL CALCULATED.3IONS-SCNC: 7 MMOL/L (ref 3–14)
APPEARANCE UR: CLEAR
APTT PPP: 32 SEC (ref 22–37)
AST SERPL W P-5'-P-CCNC: 14 U/L (ref 0–45)
BILIRUB SERPL-MCNC: 0.4 MG/DL (ref 0.2–1.3)
BILIRUB UR QL STRIP: NEGATIVE
BUN SERPL-MCNC: 17 MG/DL (ref 7–30)
CALCIUM SERPL-MCNC: 7.2 MG/DL (ref 8.5–10.1)
CHLORIDE SERPL-SCNC: 106 MMOL/L (ref 94–109)
CO2 SERPL-SCNC: 24 MMOL/L (ref 20–32)
COLOR UR AUTO: YELLOW
CREAT SERPL-MCNC: 0.88 MG/DL (ref 0.66–1.25)
ERYTHROCYTE [DISTWIDTH] IN BLOOD BY AUTOMATED COUNT: 14.2 % (ref 10–15)
GFR SERPL CREATININE-BSD FRML MDRD: >90 ML/MIN/1.7M2
GLUCOSE BLDC GLUCOMTR-MCNC: 163 MG/DL (ref 70–99)
GLUCOSE BLDC GLUCOMTR-MCNC: 187 MG/DL (ref 70–99)
GLUCOSE SERPL-MCNC: 210 MG/DL (ref 70–99)
GLUCOSE UR STRIP-MCNC: >1000 MG/DL
HBA1C MFR BLD: 9.6 % (ref 4.3–6)
HCT VFR BLD AUTO: 45.9 % (ref 40–53)
HGB BLD-MCNC: 15.1 G/DL (ref 13.3–17.7)
HGB UR QL STRIP: NEGATIVE
INR PPP: 1.1 (ref 0.86–1.14)
INTERPRETATION ECG - MUSE: NORMAL
KETONES UR STRIP-MCNC: 5 MG/DL
LEUKOCYTE ESTERASE UR QL STRIP: NEGATIVE
MCH RBC QN AUTO: 28.1 PG (ref 26.5–33)
MCHC RBC AUTO-ENTMCNC: 32.9 G/DL (ref 31.5–36.5)
MCV RBC AUTO: 85 FL (ref 78–100)
MUCOUS THREADS #/AREA URNS LPF: PRESENT /LPF
NITRATE UR QL: NEGATIVE
PH UR STRIP: 6 PH (ref 5–7)
PLATELET # BLD AUTO: 193 10E9/L (ref 150–450)
POTASSIUM SERPL-SCNC: 3.7 MMOL/L (ref 3.4–5.3)
PROT SERPL-MCNC: 5.8 G/DL (ref 6.8–8.8)
RBC # BLD AUTO: 5.38 10E12/L (ref 4.4–5.9)
RBC #/AREA URNS AUTO: <1 /HPF (ref 0–2)
SODIUM SERPL-SCNC: 137 MMOL/L (ref 133–144)
SOURCE: ABNORMAL
SP GR UR STRIP: 1.03 (ref 1–1.03)
SQUAMOUS #/AREA URNS AUTO: <1 /HPF (ref 0–1)
TROPONIN I SERPL-MCNC: <0.015 UG/L (ref 0–0.04)
TROPONIN I SERPL-MCNC: <0.015 UG/L (ref 0–0.04)
UROBILINOGEN UR STRIP-MCNC: NORMAL MG/DL (ref 0–2)
WBC # BLD AUTO: 4.7 10E9/L (ref 4–11)
WBC #/AREA URNS AUTO: 1 /HPF (ref 0–2)

## 2018-01-24 PROCEDURE — 80053 COMPREHEN METABOLIC PANEL: CPT | Performed by: EMERGENCY MEDICINE

## 2018-01-24 PROCEDURE — 85730 THROMBOPLASTIN TIME PARTIAL: CPT | Performed by: EMERGENCY MEDICINE

## 2018-01-24 PROCEDURE — 93018 CV STRESS TEST I&R ONLY: CPT | Performed by: INTERNAL MEDICINE

## 2018-01-24 PROCEDURE — 93017 CV STRESS TEST TRACING ONLY: CPT

## 2018-01-24 PROCEDURE — 85027 COMPLETE CBC AUTOMATED: CPT | Performed by: EMERGENCY MEDICINE

## 2018-01-24 PROCEDURE — 36415 COLL VENOUS BLD VENIPUNCTURE: CPT | Performed by: EMERGENCY MEDICINE

## 2018-01-24 PROCEDURE — 25000128 H RX IP 250 OP 636: Performed by: INTERNAL MEDICINE

## 2018-01-24 PROCEDURE — 93016 CV STRESS TEST SUPVJ ONLY: CPT | Performed by: INTERNAL MEDICINE

## 2018-01-24 PROCEDURE — G0378 HOSPITAL OBSERVATION PER HR: HCPCS

## 2018-01-24 PROCEDURE — 25000132 ZZH RX MED GY IP 250 OP 250 PS 637: Performed by: NURSE PRACTITIONER

## 2018-01-24 PROCEDURE — 96372 THER/PROPH/DIAG INJ SC/IM: CPT

## 2018-01-24 PROCEDURE — A9502 TC99M TETROFOSMIN: HCPCS | Performed by: FAMILY MEDICINE

## 2018-01-24 PROCEDURE — 00000146 ZZHCL STATISTIC GLUCOSE BY METER IP

## 2018-01-24 PROCEDURE — 96374 THER/PROPH/DIAG INJ IV PUSH: CPT

## 2018-01-24 PROCEDURE — 99236 HOSP IP/OBS SAME DATE HI 85: CPT | Mod: Z6 | Performed by: EMERGENCY MEDICINE

## 2018-01-24 PROCEDURE — 84484 ASSAY OF TROPONIN QUANT: CPT | Mod: 91 | Performed by: EMERGENCY MEDICINE

## 2018-01-24 PROCEDURE — 78452 HT MUSCLE IMAGE SPECT MULT: CPT

## 2018-01-24 PROCEDURE — 34300033 ZZH RX 343: Performed by: FAMILY MEDICINE

## 2018-01-24 PROCEDURE — 25000131 ZZH RX MED GY IP 250 OP 636 PS 637: Performed by: NURSE PRACTITIONER

## 2018-01-24 PROCEDURE — 85610 PROTHROMBIN TIME: CPT | Performed by: EMERGENCY MEDICINE

## 2018-01-24 PROCEDURE — 25000125 ZZHC RX 250: Performed by: NURSE PRACTITIONER

## 2018-01-24 PROCEDURE — 83036 HEMOGLOBIN GLYCOSYLATED A1C: CPT | Performed by: NURSE PRACTITIONER

## 2018-01-24 RX ORDER — NITROGLYCERIN 0.4 MG/1
0.4 TABLET SUBLINGUAL EVERY 5 MIN PRN
Status: DISCONTINUED | OUTPATIENT
Start: 2018-01-24 | End: 2018-01-24 | Stop reason: HOSPADM

## 2018-01-24 RX ORDER — LIDOCAINE 40 MG/G
CREAM TOPICAL
Status: DISCONTINUED | OUTPATIENT
Start: 2018-01-24 | End: 2018-01-24 | Stop reason: HOSPADM

## 2018-01-24 RX ORDER — NICOTINE POLACRILEX 4 MG
15-30 LOZENGE BUCCAL
Status: DISCONTINUED | OUTPATIENT
Start: 2018-01-24 | End: 2018-01-24 | Stop reason: HOSPADM

## 2018-01-24 RX ORDER — ACYCLOVIR 200 MG/1
0-1 CAPSULE ORAL
Status: DISCONTINUED | OUTPATIENT
Start: 2018-01-24 | End: 2018-01-24 | Stop reason: HOSPADM

## 2018-01-24 RX ORDER — NALOXONE HYDROCHLORIDE 0.4 MG/ML
.1-.4 INJECTION, SOLUTION INTRAMUSCULAR; INTRAVENOUS; SUBCUTANEOUS
Status: DISCONTINUED | OUTPATIENT
Start: 2018-01-24 | End: 2018-01-24 | Stop reason: HOSPADM

## 2018-01-24 RX ORDER — ALUMINA, MAGNESIA, AND SIMETHICONE 2400; 2400; 240 MG/30ML; MG/30ML; MG/30ML
30 SUSPENSION ORAL EVERY 4 HOURS PRN
Status: DISCONTINUED | OUTPATIENT
Start: 2018-01-24 | End: 2018-01-24 | Stop reason: HOSPADM

## 2018-01-24 RX ORDER — ACETAMINOPHEN 650 MG/1
650 SUPPOSITORY RECTAL EVERY 4 HOURS PRN
Status: DISCONTINUED | OUTPATIENT
Start: 2018-01-24 | End: 2018-01-24 | Stop reason: HOSPADM

## 2018-01-24 RX ORDER — ALBUTEROL SULFATE 90 UG/1
2 AEROSOL, METERED RESPIRATORY (INHALATION) EVERY 6 HOURS
Status: DISCONTINUED | OUTPATIENT
Start: 2018-01-24 | End: 2018-01-24 | Stop reason: HOSPADM

## 2018-01-24 RX ORDER — AMINOPHYLLINE 25 MG/ML
50-100 INJECTION, SOLUTION INTRAVENOUS
Status: DISCONTINUED | OUTPATIENT
Start: 2018-01-24 | End: 2018-01-24

## 2018-01-24 RX ORDER — ALBUTEROL SULFATE 90 UG/1
2 AEROSOL, METERED RESPIRATORY (INHALATION) EVERY 5 MIN PRN
Status: DISCONTINUED | OUTPATIENT
Start: 2018-01-24 | End: 2018-01-24

## 2018-01-24 RX ORDER — REGADENOSON 0.08 MG/ML
0.4 INJECTION, SOLUTION INTRAVENOUS ONCE
Status: COMPLETED | OUTPATIENT
Start: 2018-01-24 | End: 2018-01-24

## 2018-01-24 RX ORDER — SIMVASTATIN 20 MG
40 TABLET ORAL AT BEDTIME
Status: DISCONTINUED | OUTPATIENT
Start: 2018-01-24 | End: 2018-01-24 | Stop reason: HOSPADM

## 2018-01-24 RX ORDER — NICOTINE POLACRILEX 4 MG
15-30 LOZENGE BUCCAL
Status: DISCONTINUED | OUTPATIENT
Start: 2018-01-24 | End: 2018-01-24

## 2018-01-24 RX ORDER — ACETAMINOPHEN 325 MG/1
650 TABLET ORAL EVERY 4 HOURS PRN
Status: DISCONTINUED | OUTPATIENT
Start: 2018-01-24 | End: 2018-01-24 | Stop reason: HOSPADM

## 2018-01-24 RX ORDER — DEXTROSE MONOHYDRATE 25 G/50ML
25-50 INJECTION, SOLUTION INTRAVENOUS
Status: DISCONTINUED | OUTPATIENT
Start: 2018-01-24 | End: 2018-01-24 | Stop reason: HOSPADM

## 2018-01-24 RX ORDER — ASPIRIN 81 MG/1
81 TABLET ORAL DAILY
Status: DISCONTINUED | OUTPATIENT
Start: 2018-01-24 | End: 2018-01-24 | Stop reason: HOSPADM

## 2018-01-24 RX ORDER — DEXTROSE MONOHYDRATE 25 G/50ML
25-50 INJECTION, SOLUTION INTRAVENOUS
Status: DISCONTINUED | OUTPATIENT
Start: 2018-01-24 | End: 2018-01-24

## 2018-01-24 RX ADMIN — PANTOPRAZOLE SODIUM 40 MG: 40 INJECTION, POWDER, FOR SOLUTION INTRAVENOUS at 08:20

## 2018-01-24 RX ADMIN — ASPIRIN 81 MG: 81 TABLET, COATED ORAL at 08:17

## 2018-01-24 RX ADMIN — REGADENOSON 0.4 MG: 0.08 INJECTION, SOLUTION INTRAVENOUS at 09:59

## 2018-01-24 RX ADMIN — INSULIN GLARGINE 30 UNITS: 100 INJECTION, SOLUTION SUBCUTANEOUS at 08:21

## 2018-01-24 RX ADMIN — TETROFOSMIN 34.1 MCI.: 1.38 INJECTION, POWDER, LYOPHILIZED, FOR SOLUTION INTRAVENOUS at 11:20

## 2018-01-24 RX ADMIN — TETROFOSMIN 8.8 MCI.: 1.38 INJECTION, POWDER, LYOPHILIZED, FOR SOLUTION INTRAVENOUS at 09:00

## 2018-01-24 RX ADMIN — ALBUTEROL SULFATE 2 PUFF: 90 AEROSOL, METERED RESPIRATORY (INHALATION) at 05:10

## 2018-01-24 NOTE — PLAN OF CARE
Problem: Patient Care Overview  Goal: Discharge Needs Assessment  Outpatient/Observation goals to be met before discharge home    1. - Serial troponins and stress test complete. No, in progress, Troponins negative.   2. - Seen and cleared by consultant if applicable No, in progress.  3. - Adequate pain control on oral analgesia Yes, no pain.  4. - Vital signs normal or at patient baseline Yes, VSS.  5. - Safe disposition plan has been identified Yes, home with wife.

## 2018-01-24 NOTE — CONSULTS
Diabetes Education  Received consult request to see this 42 year old male with Type 2 diabetes, on observation unit after presentation to ED with shortness of breath and chest pain.  His most recent A1c result was 9.6%, which is down from 11.1% in August 2017.  He was npo for Lexiscan today, and received 30 units of Lantus, which is half of his home dose of 60 units.  A medium resistance correction scale is ordered.  His glucose results are:  Results for JENINFER GARCÍA (MRN [id number]) as of 1/24/2018 11:38   Ref. Range 1/23/2018 20:25 1/24/2018 05:06 1/24/2018 06:54   Glucose Latest Ref Range: 70 - 99 mg/dL 296 (H) 187 (H) 210 (H)       Met with patient.  He verifies that his home dose of Lantus is 60 units, and he takes in the am.  He does have NovoLog at home, and will use if his glucose is 260 or higher; he states if he uses it for blood glucoses lower than this he experiences hypoglycemia.  He was on metformin in the past, but currently not taking, and he feels his glucoses are better controlled.  He sees his PCP and a diabetes educator at [facility]; unable to access notes.  He should follow-up with his providers at [facility].  Ирина Silva MS RN CDE CDTC  [phone]

## 2018-01-24 NOTE — PROGRESS NOTES
11:59 AM The patient was seen and examined by me and the case was discussed with the BALBINA. We are in agreement with the assessment and plan.  42-year-old male with diabetes admitted from the Emergency Department for atypical chest pain.  Apparently this is been an intermittent problem for years.  There are no objective concerning findings in the emergency department, he had a normal chest x-ray and serial troponins were negative.  The plan was for nuclear medicine Lexiscan and discharge home if negative.  He does have a primary care provider for follow-up.    Physical exam:  General: Awake, alert, no distress  Cardiac: Regular rate and rhythm no murmurs rubs or gallops  Respiratory: Lungs are clear    Assessment and plan: 42-year-old male with diabetes and atypical chest and abdominal pain that is subacute and recurrent, will discharge home if his scan is negative if otherwise will contact cardiology.  Advised the patient and his wife to follow-up and discuss these subacute symptoms with his provider.  No additional diagnostics indicated in this essentially young and healthy person other than the diabetes.  Results of the Lexiscan pending at the time of this dictation

## 2018-01-24 NOTE — PLAN OF CARE
Problem: Patient Care Overview  Goal: Discharge Needs Assessment  - Serial troponins and stress test complete. NO. Pending  - Seen and cleared by consultant if applicable. No  - Adequate pain control on oral analgesia. Yes  - Vital signs normal or at patient baseline. Yes  - Safe disposition plan has been identified. No. Pending.

## 2018-01-24 NOTE — ED PROVIDER NOTES
"  History     Chief Complaint   Patient presents with     Tachycardia     Shortness of Breath     HPI  Israel Brown is a 42 year old male with a history of asthma, diabetes mellitus, neuropathy, and rheumatoid arthritis who presents to the ED today for shortness of breath and chest pain. Patient states this morning when taking out the trash he had shortness of breath and chest pain. Patient described the chest pain as sharp and reported \"I felt like someone was taking a pin and poking me on my chest (indicating right-side)\". Patient also noted that it took him 20 minutes to catch his breath. Patient states he has had a cold for \"about a month\". Patient also reports lower abdominal pain and tenderness that began today. Patient states he's been feeling nauseated all day with decreased appetite, \"went to get food and I had two bites and that's it\". Patient denies sweating, productive cough, and previous abdominal surgical history but does state he feels \"cold\".       PAST MEDICAL HISTORY:   Past Medical History:   Diagnosis Date     Asthma      Diabetes mellitus (H)      Neuropathy      RA (rheumatoid arthritis) (H)        PAST SURGICAL HISTORY:   Past Surgical History:   Procedure Laterality Date     REALIGN PATELLA         FAMILY HISTORY: No family history on file.    SOCIAL HISTORY:   Social History   Substance Use Topics     Smoking status: Never Smoker     Smokeless tobacco: Never Used     Alcohol use No       Patient's Medications   New Prescriptions    No medications on file   Previous Medications    ALBUTEROL (PROAIR HFA, PROVENTIL HFA, VENTOLIN HFA) 108 (90 BASE) MCG/ACT INHALER    Inhale 2 puffs into the lungs every 6 hours    FLUTICASONE (FLONASE) 50 MCG/ACT SPRAY    Spray 1 spray in nostril daily    INSULIN GLARGINE (LANTUS) 100 UNIT/ML INJECTION    Inject 60 Units Subcutaneous every morning    MONTELUKAST (SINGULAIR) 10 MG TABLET    Take 10 mg by mouth At Bedtime    OMEPRAZOLE (PRILOSEC) 20 MG CR " "CAPSULE    Take 20 mg by mouth daily    PREGABALIN (LYRICA PO)        SIMVASTATIN (ZOCOR) 40 MG TABLET    Take 40 mg by mouth At Bedtime   Modified Medications    No medications on file   Discontinued Medications    ASPIRIN 81 MG TABLET    Take 1 tablet by mouth daily.    FENOFIBRATE 145 MG TABLET    Take 145 mg by mouth daily    GABAPENTIN (NEURONTIN) 300 MG CAPSULE    Take 300 mg by mouth 3 times daily.    LIRAGLUTIDE (VICTOZA) 18 MG/3ML SOLN    Inject 0.6 mg Subcutaneous daily    METFORMIN (GLUCOPHAGE) 500 MG TABLET    Take 2 tablets (1,000 mg) by mouth daily (with breakfast) HOLD for 48 hours after IV Contrast          Allergies   Allergen Reactions     Cortisone      Hmg-Coa-R Inhibitors      Penicillins          I have reviewed the Medications, Allergies, Past Medical and Surgical History, and Social History in the Epic system.  Review of Systems   Constitutional: Negative for fever.   HENT: Negative for congestion.    Eyes: Negative for redness.   Respiratory: Positive for shortness of breath.    Cardiovascular: Positive for chest pain.   Gastrointestinal: Positive for abdominal pain (tenderness) and nausea.   Genitourinary: Negative for difficulty urinating.   Musculoskeletal: Negative for arthralgias and neck stiffness.   Skin: Negative for color change.   Neurological: Negative for headaches.   Psychiatric/Behavioral: Negative for confusion.       Physical Exam   BP: 125/75  Pulse: 113  Temp: 99.1  F (37.3  C)  Resp: 16  Height: 165.1 cm (5' 5\")  Weight: 90 kg (198 lb 8 oz)  SpO2: 97 %      Physical Exam   Constitutional: He appears well-developed and well-nourished. No distress.   HENT:   Head: Normocephalic and atraumatic.   Mouth/Throat: Oropharynx is clear and moist. No oropharyngeal exudate.   Eyes: Pupils are equal, round, and reactive to light. No scleral icterus.   Cardiovascular: Normal rate, regular rhythm, normal heart sounds and intact distal pulses.    Pulmonary/Chest: Effort normal and breath " sounds normal. No respiratory distress.   Abdominal: Soft. Bowel sounds are normal. There is tenderness in the right lower quadrant and periumbilical area.   Musculoskeletal: Normal range of motion. He exhibits no edema or tenderness.   Neurological: He is alert. He has normal strength. Coordination normal.   Skin: Skin is warm and dry. No rash noted. He is not diaphoretic.   Nursing note and vitals reviewed.      ED Course     ED Course     Results for orders placed or performed during the hospital encounter of 01/23/18 (from the past 24 hour(s))   EKG 12-lead, tracing only   Result Value Ref Range    Interpretation ECG Click View Image link to view waveform and result    CBC with platelets differential   Result Value Ref Range    WBC 6.6 4.0 - 11.0 10e9/L    RBC Count 5.84 4.4 - 5.9 10e12/L    Hemoglobin 16.3 13.3 - 17.7 g/dL    Hematocrit 50.0 40.0 - 53.0 %    MCV 86 78 - 100 fl    MCH 27.9 26.5 - 33.0 pg    MCHC 32.6 31.5 - 36.5 g/dL    RDW 14.2 10.0 - 15.0 %    Platelet Count 229 150 - 450 10e9/L    Diff Method Automated Method     % Neutrophils 77.3 %    % Lymphocytes 14.6 %    % Monocytes 5.2 %    % Eosinophils 2.3 %    % Basophils 0.3 %    % Immature Granulocytes 0.3 %    Nucleated RBCs 0 0 /100    Absolute Neutrophil 5.1 1.6 - 8.3 10e9/L    Absolute Lymphocytes 1.0 0.8 - 5.3 10e9/L    Absolute Monocytes 0.3 0.0 - 1.3 10e9/L    Absolute Eosinophils 0.2 0.0 - 0.7 10e9/L    Absolute Basophils 0.0 0.0 - 0.2 10e9/L    Abs Immature Granulocytes 0.0 0 - 0.4 10e9/L    Absolute Nucleated RBC 0.0    Comprehensive metabolic panel   Result Value Ref Range    Sodium 138 133 - 144 mmol/L    Potassium 3.6 3.4 - 5.3 mmol/L    Chloride 105 94 - 109 mmol/L    Carbon Dioxide 24 20 - 32 mmol/L    Anion Gap 10 3 - 14 mmol/L    Glucose 296 (H) 70 - 99 mg/dL    Urea Nitrogen 22 7 - 30 mg/dL    Creatinine 1.13 0.66 - 1.25 mg/dL    GFR Estimate 70 >60 mL/min/1.7m2    GFR Estimate If Black 85 >60 mL/min/1.7m2    Calcium 7.8 (L) 8.5  - 10.1 mg/dL    Bilirubin Total 0.6 0.2 - 1.3 mg/dL    Albumin 3.2 (L) 3.4 - 5.0 g/dL    Protein Total 6.9 6.8 - 8.8 g/dL    Alkaline Phosphatase 72 40 - 150 U/L    ALT 45 0 - 70 U/L    AST 14 0 - 45 U/L   Lipase   Result Value Ref Range    Lipase 87 73 - 393 U/L   Troponin I   Result Value Ref Range    Troponin I ES <0.015 0.000 - 0.045 ug/L   BNP   Result Value Ref Range    N-Terminal Pro BNP Inpatient 88 0 - 450 pg/mL   D dimer quantitative   Result Value Ref Range    D Dimer 0.5 0.0 - 0.50 ug/ml FEU   Influenza A/B antigen   Result Value Ref Range    Influenza A/B Agn Specimen Nasopharyngeal     Influenza A Negative NEG^Negative    Influenza B Negative NEG^Negative   Chest XR,  PA & LAT    Narrative    Exam: XR CHEST 2 VW, 1/23/2018 8:43 PM    Indication: SOA, cough;     Comparison: CT 11/14/2016, x-ray same day    Findings:   Normal cardiomediastinal silhouette and pulmonary vasculature. Low  lung volumes accentuate pulmonary markings. Bibasilar atelectasis,  right greater than left.  No pleural effusion or pneumothorax. Bones  and upper abdomen are unremarkable.      Impression    Impression: Bibasilar atelectasis, right greater than left. Low lung  volumes.    SIS MCINTOSH MD ([place])   CT Abdomen Pelvis w Contrast    Impression    IMPRESSION:   1. No acute intra-abdominal pathology. Normal appendix.  2. Bronchial wall thickening and mucous plugging in the lung bases;  consider bronchitis.    UA with Microscopic reflex to Culture   Result Value Ref Range    Color Urine Yellow     Appearance Urine Clear     Glucose Urine >1000 (A) NEG^Negative mg/dL    Bilirubin Urine Negative NEG^Negative    Ketones Urine 5 (A) NEG^Negative mg/dL    Specific Gravity Urine 1.033 1.003 - 1.035    Blood Urine Negative NEG^Negative    pH Urine 6.0 5.0 - 7.0 pH    Protein Albumin Urine 10 (A) NEG^Negative mg/dL    Urobilinogen mg/dL Normal 0.0 - 2.0 mg/dL    Nitrite Urine Negative NEG^Negative    Leukocyte Esterase Urine Negative  NEG^Negative    Source Midstream Urine     WBC Urine 1 0 - 2 /HPF    RBC Urine <1 0 - 2 /HPF    Squamous Epithelial /HPF Urine <1 0 - 1 /HPF    Mucous Urine Present (A) NEG^Negative /LPF     Procedures             EKG Interpretation:      Interpreted by MATIAS GUTIERREZ MD  Time reviewed: 1746  Symptoms at time of EKG: None   Rhythm: sinus tachycardia  Rate: 106  Axis: Normal  Ectopy: PVCs  Conduction: normal  ST Segments/ T Waves: No acute ischemic changes  Q Waves: III and aVf  Comparison to prior: Sinus tachycardia otherwise unchanged compared to 11/14/16    Clinical Impression: sinus tachycardia without other acute change                 Critical Care time:    Medications   sodium chloride 0.9 % bag 500mL for CT scan flush use (80 mLs Intravenous Given 1/23/18 2220)   0.9% sodium chloride BOLUS (1,000 mLs Intravenous New Bag 1/23/18 2204)   iopamidol (ISOVUE-370) solution 122 mL (122 mLs Intravenous Given 1/23/18 2220)             Assessments & Plan (with Medical Decision Making)   42 year old male with history of diabetes to the emergency department for evaluation of episode of exertional dyspnea and chest pain that lasted approximately 20 minutes earlier today.  The patient also complains of lower abdominal pain.  In the emergency department, the patient appears clinically well.  He has normal vital signs.  EKG does not have any acute ischemic change.  He is mildly tachycardic at 106.  His chest radiograph was unrevealing.  The patient's troponin is normal as is his d-dimer.  I do not suspect pulmonary embolus with a normal d-dimer in this low risk patient.  The patient's abdominal pain was evaluated with labs which were normal, urinalysis which was normal, and CT of the abdomen/pelvis which did not reveal any acute pathology.  Patient continued to appear clinically well.  He was comfortable here in the emergency department.  Stress echo was recommended due to his cardiac risk factors.  The patient has elected  admission to the observation unit to complete the chest pain protocol including serial troponins and stress echo.  .  I have reviewed the nursing notes.    I have reviewed the findings, diagnosis, plan and need for follow up with the patient.    New Prescriptions    No medications on file       Final diagnoses:   Acute chest pain   Dyspnea on exertion   Lower abdominal pain     IElsy, am serving as a trained medical scribe to document services personally performed by Johnny Mcwilliams MD, based on the provider's statements to me.      IJohnny MD, was physically present and have reviewed and verified the accuracy of this note documented by Elsy Plunkett.   1/23/2018   [facility], [place], EMERGENCY DEPARTMENT     Johnny Mcwilliams MD  01/24/18 0037       Johnny Mcwilliams MD  01/24/18 0038

## 2018-01-24 NOTE — DISCHARGE SUMMARY
Discharge Summary    Israel Brown MRN# [id number]   YOB: 1975 Age: 42 year old     Date of Admission:  1/23/2018  Date of Discharge:  1/24/2018  Admitting Physician:  Johnny Mcwilliams MD  Discharge Physician:  SUSU MANCUSO  Discharging Service:  Emergency Department Observation Unit     Primary Provider: Edmund Olson          Discharge Diagnosis:   Chest pain              Discharge Disposition:   Discharged to home           Condition on Discharge:   Discharge condition: Stable   Code status on discharge: Full Code           Procedures:   Cardiology procedures perfromed:   Stress testing             Discharge Medications:     Current Discharge Medication List      CONTINUE these medications which have NOT CHANGED    Details   Pregabalin (LYRICA PO)       fluticasone (FLONASE) 50 MCG/ACT spray Spray 1 spray in nostril daily      insulin glargine (LANTUS) 100 UNIT/ML injection Inject 60 Units Subcutaneous every morning      montelukast (SINGULAIR) 10 MG tablet Take 10 mg by mouth At Bedtime      omeprazole (PRILOSEC) 20 MG CR capsule Take 20 mg by mouth daily      simvastatin (ZOCOR) 40 MG tablet Take 40 mg by mouth At Bedtime      albuterol (PROAIR HFA, PROVENTIL HFA, VENTOLIN HFA) 108 (90 BASE) MCG/ACT inhaler Inhale 2 puffs into the lungs every 6 hours                   Consultations:   No consultations were requested during this admission             Brief History of Illness:   Israel Brown is a 42 year old male with a history of asthma, diabetes mellitus, neuropathy, and rheumatoid arthritis who presents to the ED today for shortness of breath and chest pain.           Hospital Course:   1. Chest pain/SOB: This is a 42 year old male patient with history of DM, neuropathy,and RA who presented with acute onset of chest pain and shortness of breath. Initial work up in the ED including troponin, EKG, D-dimer and CXR were negative. Subsequently patient was admitted to the ED obs for  "monitoring and NM Lexiscan test. Serial troponins negative. Patient kept on continues cardiac monitor overnight without any interval events. Lexiscan is negative. Patient discharged in stable condition. Encouraged to follow up with PCP in 1 week.     2. DM: A1C 9.6. Continue with Lantus 60 units every morning. Follow up with primary care provider for further management.             Final Day of Progress before Discharge:       Physical Exam:  Blood pressure 96/68, pulse 73, temperature 97.9  F (36.6  C), temperature source Oral, resp. rate 16, height 1.651 m (5' 5\"), weight 85.5 kg (188 lb 9.6 oz), SpO2 95 %.    EXAM:  Physical Exam   Constitutional: Pt is oriented to person, place, and time.Pt appears well-developed and well-nourished.   HENT:   Head: Normocephalic and atraumatic.   Eyes: Conjunctivae are normal. Pupils are equal, round, and reactive to light.   Neck: Normal range of motion. Neck supple.   Cardiovascular: Normal rate, regular rhythm, normal heart sounds and intact distal pulses.    Pulmonary/Chest: Effort normal and breath sounds normal. No respiratory distress. Pt has no wheezes. Pt has no rales  Abdominal: Soft. Bowel sounds are normal. Pt exhibits no distension and no mass. No tenderness. Pt has no rebound and no guarding.   Musculoskeletal: Normal range of motion. Pt exhibits no edema.   Neurological: Pt is alert and oriented to person, place, and time. Normal reflexes.   Skin: Skin is warm and dry. No rash noted.   Psychiatric: Pt has a normal mood and affect. Behavior is normal. Judgment and thought content normal.             Data:  All laboratory data reviewed             Significant Results:   None  Results for orders placed or performed during the hospital encounter of 01/23/18   Chest XR,  PA & LAT    Narrative    Exam: XR CHEST 2 VW, 1/23/2018 8:43 PM    Indication: SOA, cough;     Comparison: CT 11/14/2016, x-ray same day    Findings:   Normal cardiomediastinal silhouette and pulmonary " vasculature. Low  lung volumes accentuate pulmonary markings. Bibasilar atelectasis,  right greater than left.  No pleural effusion or pneumothorax. Bones  and upper abdomen are unremarkable.      Impression    Impression: Bibasilar atelectasis, right greater than left. Low lung  volumes.    SIS MCINTOSH MD ([place])   CT Abdomen Pelvis w Contrast    Narrative    EXAMINATION: CT ABDOMEN PELVIS W CONTRAST, 1/23/2018 11:02 PM    TECHNIQUE:  Helical CT images from the lung bases through the  symphysis pubis were obtained with IV contrast. Contrast dose:  iopamidol (ISOVUE-370) solution 122 mL    COMPARISON: 8/18/2017 CT abdomen/pelvis    HISTORY: lower abdominal pain- eval for appy;     FINDINGS:    Abdomen and pelvis: Increased conspicuity of a 8 mm hypodensity in the  lateral right hepatic lobe, too small to fully characterize but  benign-appearing. The liver is otherwise unremarkable. The gallbladder  is within normal limits. No intra or extrahepatic biliary dilation.  Small splenule in the hilum of the otherwise unremarkable spleen. The  pancreas and adrenal glands are within normal limits. No renal mass.  No hydronephrosis, hydroureter, or urinary tract stone. The bladder  and prostate are unremarkable. No free fluid or free air. No bowel  obstruction.   In the left side of the abdomen series 2 image 34 there  is a single loop of small bowel with slightly thickened wall some  mildly increased draining lymph nodes in the mesentery, nonspecific.  Normal appendix. The major abdominal vessels are patent. No abdominal  or pelvic lymphadenopathy.    Lung bases/lower chest:  Bronchial wall thickening and mucous plugging  in the lung bases. Linear atelectasis in the lingula and right middle  lobe. Trace gynecomastia.    Bones and soft tissues: No acute or aggressive osseous lesion.      Impression    IMPRESSION:   1. One minor finding, a single loop of small bowel with mild bowel  wall thickening left upper abdomen with  some slightly more prominent  draining lymph nodes, may represent jejunitis in the right clinical  setting or alternatively may be normal and simply peristalsis.     Otherwise normal abdomen. Normal appendix.  2. Bronchial wall thickening and mucous plugging in the lung bases;  consider bronchitis.     I have personally reviewed the examination and initial interpretation  and I agree with the findings.    CARA AHN MD   NM Lexiscan stress test (nuc card)    Narrative    Examination:   NM MPI WITH LEXISCAN   Code:   [id number]   Date:  1/24/2018 11:19 AM     Indication:  chest pain x 24 hours;      Previous Study: No previous Myocardial Perfusion studies for  comparison.    Additional Information:  none.     Protocol:    Rest and stress myocardial perfusion imaging was performed using 8.8  and 34.1 mCi of Tc-99m tetrofosmin. Pharmacological stress was  performed with 0.4  mg of Lexiscan.     Findings:  1. Overall quality of the study: Diagnostic.   2. Left ventricular cavity is normal on the rest and stress studies.  3. SPECT images demonstrate no perfusion abnormality. The summed  stress score is 0.   4. Left ventricular ejection fraction is 73%. Left ventricular  end-diastolic volume is 88 mL. End-systolic volume is 24 mL.  5. Baseline EKG  findings reported separately in EPIC.  6. Limited noncontrast CT demonstrates no lung nodules.       Impression    Impression:  1. Normal myocardial SPECT study with a summed stress score of 0. No  evidence of ischemia or infarct.  2. Normal cardiac function with ejection fraction of 73%. Normal wall  motion..       I have personally reviewed the examination and initial interpretation  and I agree with the findings.    CARA AHN MD   CBC with platelets differential   Result Value Ref Range    WBC 6.6 4.0 - 11.0 10e9/L    RBC Count 5.84 4.4 - 5.9 10e12/L    Hemoglobin 16.3 13.3 - 17.7 g/dL    Hematocrit 50.0 40.0 - 53.0 %    MCV 86 78 - 100 fl    MCH 27.9 26.5 -  33.0 pg    MCHC 32.6 31.5 - 36.5 g/dL    RDW 14.2 10.0 - 15.0 %    Platelet Count 229 150 - 450 10e9/L    Diff Method Automated Method     % Neutrophils 77.3 %    % Lymphocytes 14.6 %    % Monocytes 5.2 %    % Eosinophils 2.3 %    % Basophils 0.3 %    % Immature Granulocytes 0.3 %    Nucleated RBCs 0 0 /100    Absolute Neutrophil 5.1 1.6 - 8.3 10e9/L    Absolute Lymphocytes 1.0 0.8 - 5.3 10e9/L    Absolute Monocytes 0.3 0.0 - 1.3 10e9/L    Absolute Eosinophils 0.2 0.0 - 0.7 10e9/L    Absolute Basophils 0.0 0.0 - 0.2 10e9/L    Abs Immature Granulocytes 0.0 0 - 0.4 10e9/L    Absolute Nucleated RBC 0.0    Comprehensive metabolic panel   Result Value Ref Range    Sodium 138 133 - 144 mmol/L    Potassium 3.6 3.4 - 5.3 mmol/L    Chloride 105 94 - 109 mmol/L    Carbon Dioxide 24 20 - 32 mmol/L    Anion Gap 10 3 - 14 mmol/L    Glucose 296 (H) 70 - 99 mg/dL    Urea Nitrogen 22 7 - 30 mg/dL    Creatinine 1.13 0.66 - 1.25 mg/dL    GFR Estimate 70 >60 mL/min/1.7m2    GFR Estimate If Black 85 >60 mL/min/1.7m2    Calcium 7.8 (L) 8.5 - 10.1 mg/dL    Bilirubin Total 0.6 0.2 - 1.3 mg/dL    Albumin 3.2 (L) 3.4 - 5.0 g/dL    Protein Total 6.9 6.8 - 8.8 g/dL    Alkaline Phosphatase 72 40 - 150 U/L    ALT 45 0 - 70 U/L    AST 14 0 - 45 U/L   Lipase   Result Value Ref Range    Lipase 87 73 - 393 U/L   Troponin I   Result Value Ref Range    Troponin I ES <0.015 0.000 - 0.045 ug/L   BNP   Result Value Ref Range    N-Terminal Pro BNP Inpatient 88 0 - 450 pg/mL   D dimer quantitative   Result Value Ref Range    D Dimer 0.5 0.0 - 0.50 ug/ml FEU   UA with Microscopic reflex to Culture   Result Value Ref Range    Color Urine Yellow     Appearance Urine Clear     Glucose Urine >1000 (A) NEG^Negative mg/dL    Bilirubin Urine Negative NEG^Negative    Ketones Urine 5 (A) NEG^Negative mg/dL    Specific Gravity Urine 1.033 1.003 - 1.035    Blood Urine Negative NEG^Negative    pH Urine 6.0 5.0 - 7.0 pH    Protein Albumin Urine 10 (A) NEG^Negative  mg/dL    Urobilinogen mg/dL Normal 0.0 - 2.0 mg/dL    Nitrite Urine Negative NEG^Negative    Leukocyte Esterase Urine Negative NEG^Negative    Source Midstream Urine     WBC Urine 1 0 - 2 /HPF    RBC Urine <1 0 - 2 /HPF    Squamous Epithelial /HPF Urine <1 0 - 1 /HPF    Mucous Urine Present (A) NEG^Negative /LPF   Troponin I   Result Value Ref Range    Troponin I ES <0.015 0.000 - 0.045 ug/L   CBC with platelets   Result Value Ref Range    WBC 4.7 4.0 - 11.0 10e9/L    RBC Count 5.38 4.4 - 5.9 10e12/L    Hemoglobin 15.1 13.3 - 17.7 g/dL    Hematocrit 45.9 40.0 - 53.0 %    MCV 85 78 - 100 fl    MCH 28.1 26.5 - 33.0 pg    MCHC 32.9 31.5 - 36.5 g/dL    RDW 14.2 10.0 - 15.0 %    Platelet Count 193 150 - 450 10e9/L   Comprehensive metabolic panel   Result Value Ref Range    Sodium 137 133 - 144 mmol/L    Potassium 3.7 3.4 - 5.3 mmol/L    Chloride 106 94 - 109 mmol/L    Carbon Dioxide 24 20 - 32 mmol/L    Anion Gap 7 3 - 14 mmol/L    Glucose 210 (H) 70 - 99 mg/dL    Urea Nitrogen 17 7 - 30 mg/dL    Creatinine 0.88 0.66 - 1.25 mg/dL    GFR Estimate >90 >60 mL/min/1.7m2    GFR Estimate If Black >90 >60 mL/min/1.7m2    Calcium 7.2 (L) 8.5 - 10.1 mg/dL    Bilirubin Total 0.4 0.2 - 1.3 mg/dL    Albumin 2.6 (L) 3.4 - 5.0 g/dL    Protein Total 5.8 (L) 6.8 - 8.8 g/dL    Alkaline Phosphatase 60 40 - 150 U/L    ALT 36 0 - 70 U/L    AST 14 0 - 45 U/L   INR   Result Value Ref Range    INR 1.10 0.86 - 1.14   Partial thromboplastin time   Result Value Ref Range    PTT 32 22 - 37 sec   Troponin I   Result Value Ref Range    Troponin I ES <0.015 0.000 - 0.045 ug/L   Hemoglobin A1c   Result Value Ref Range    Hemoglobin A1C 9.6 (H) 4.3 - 6.0 %   Glucose by meter   Result Value Ref Range    Glucose 187 (H) 70 - 99 mg/dL   Glucose by meter   Result Value Ref Range    Glucose 163 (H) 70 - 99 mg/dL   EKG 12-lead, tracing only   Result Value Ref Range    Interpretation ECG Click View Image link to view waveform and result    Diabetes Educator  IP Consult     Narrative    Ирина Silva RN     1/24/2018 11:43 AM  Diabetes Education  Received consult request to see this 42 year old male with Type 2   diabetes, on observation unit after presentation to ED with   shortness of breath and chest pain.  His most recent A1c result was 9.6%, which is down from 11.1% in   August 2017.  He was npo for Lexiscan today, and received 30   units of Lantus, which is half of his home dose of 60 units.  A   medium resistance correction scale is ordered.  His glucose results are:  Results for JENNIFER GARCÍA (MRN [id number]) as of   1/24/2018 11:38   Ref. Range 1/23/2018 20:25 1/24/2018 05:06 1/24/2018 06:54   Glucose Latest Ref Range: 70 - 99 mg/dL 296 (H) 187 (H) 210 (H)       Met with patient.  He verifies that his home dose of Lantus is 60   units, and he takes in the am.  He does have NovoLog at home, and   will use if his glucose is 260 or higher; he states if he uses it   for blood glucoses lower than this he experiences hypoglycemia.    He was on metformin in the past, but currently not taking, and he   feels his glucoses are better controlled.  He sees his PCP and a diabetes educator at [facility]; unable to access   notes.  He should follow-up with his providers at [facility].  Ирина Silva MS RN CDE CDTC  [phone]     Influenza A/B antigen   Result Value Ref Range    Influenza A/B Agn Specimen Nasopharyngeal     Influenza A Negative NEG^Negative    Influenza B Negative NEG^Negative      Recent Results (from the past 48 hour(s))   Chest XR,  PA & LAT    Narrative    Exam: XR CHEST 2 VW, 1/23/2018 8:43 PM    Indication: SOA, cough;     Comparison: CT 11/14/2016, x-ray same day    Findings:   Normal cardiomediastinal silhouette and pulmonary vasculature. Low  lung volumes accentuate pulmonary markings. Bibasilar atelectasis,  right greater than left.  No pleural effusion or pneumothorax. Bones  and upper abdomen are unremarkable.      Impression    Impression: Bibasilar  atelectasis, right greater than left. Low lung  volumes.    SIS MCINTOSH MD ([place])   CT Abdomen Pelvis w Contrast    Narrative    EXAMINATION: CT ABDOMEN PELVIS W CONTRAST, 1/23/2018 11:02 PM    TECHNIQUE:  Helical CT images from the lung bases through the  symphysis pubis were obtained with IV contrast. Contrast dose:  iopamidol (ISOVUE-370) solution 122 mL    COMPARISON: 8/18/2017 CT abdomen/pelvis    HISTORY: lower abdominal pain- eval for appy;     FINDINGS:    Abdomen and pelvis: Increased conspicuity of a 8 mm hypodensity in the  lateral right hepatic lobe, too small to fully characterize but  benign-appearing. The liver is otherwise unremarkable. The gallbladder  is within normal limits. No intra or extrahepatic biliary dilation.  Small splenule in the hilum of the otherwise unremarkable spleen. The  pancreas and adrenal glands are within normal limits. No renal mass.  No hydronephrosis, hydroureter, or urinary tract stone. The bladder  and prostate are unremarkable. No free fluid or free air. No bowel  obstruction.   In the left side of the abdomen series 2 image 34 there  is a single loop of small bowel with slightly thickened wall some  mildly increased draining lymph nodes in the mesentery, nonspecific.  Normal appendix. The major abdominal vessels are patent. No abdominal  or pelvic lymphadenopathy.    Lung bases/lower chest:  Bronchial wall thickening and mucous plugging  in the lung bases. Linear atelectasis in the lingula and right middle  lobe. Trace gynecomastia.    Bones and soft tissues: No acute or aggressive osseous lesion.      Impression    IMPRESSION:   1. One minor finding, a single loop of small bowel with mild bowel  wall thickening left upper abdomen with some slightly more prominent  draining lymph nodes, may represent jejunitis in the right clinical  setting or alternatively may be normal and simply peristalsis.     Otherwise normal abdomen. Normal appendix.  2. Bronchial wall  thickening and mucous plugging in the lung bases;  consider bronchitis.     I have personally reviewed the examination and initial interpretation  and I agree with the findings.    CARA AHN MD                Pending Results:   Unresulted Labs Ordered in the Past 30 Days of this Admission     No orders found for last 61 day(s).                  Discharge Instructions and Follow-Up:   No discharge procedures on file.       Attestation:  Deirdre Nice. JED

## 2018-01-24 NOTE — PLAN OF CARE
Problem: Patient Care Overview  Goal: Discharge Needs Assessment  - Serial troponins and stress test complete. : Yes, trops = Neg x3, stress test pending results  - Seen and cleared by consultant if applicable : No  - Adequate pain control on oral analgesia : Yes  - Vital signs normal or at patient baseline : Yes  - Safe disposition plan has been identified : No    Pt a/o x 4; VSS, pt denies pain, dizziness, n/v, or SOB. Pt reports mild headache from not eating. Pt ambulated to bathroom and voided without problem. Blood sugar level 163, pt refused insulin. Pt eating dinner and resting comfortably in bed with spouse at bedside.

## 2018-01-24 NOTE — H&P
Epic Downtime  ED Observation Provider H & P handwritten. Will be scanned into patient's chart.  Hx: DM type 2, insulin dependent.  Takes 60 Units Lantus daily- given NPO status for Lexiscan, will give Lantus 30 Units x 1 this morning. He may resume PTA Lantus after Lexiscan.    IFEANYI Davenport, CNP  ED Observation Unit  [facility]  01/24/2018  02:00 AM

## 2018-01-24 NOTE — PROGRESS NOTES
DC instructions given to pt and spouse, verbalized understanding.  All belongings with pt, IV DC'd and documented.     Pt walked off unit with spouse.

## 2018-01-24 NOTE — PLAN OF CARE
"Problem: Patient Care Overview  Goal: Discharge Needs Assessment  Patient alert and oriented. Presented to the ED with chest pain and SOB. On tele monitoring. SR. Tele notified. Sats are 98% on room air. Denies pain at this time. /73 (BP Location: Left arm)  Pulse 55  Temp 98.1  F (36.7  C) (Oral)  Resp 16  Ht 1.651 m (5' 5\")  Wt 85.5 kg (188 lb 9.6 oz)  SpO2 98%  BMI 31.38 kg/m2        "

## 2018-01-24 NOTE — PLAN OF CARE
Problem: Patient Care Overview  Goal: Discharge Needs Assessment  Outpatient/Observation goals to be met before discharge home     1. - Serial troponins and stress test complete. No, in progress, Troponins negative.   2. - Seen and cleared by consultant if applicable No, in progress.  3. - Adequate pain control on oral analgesia Yes, no pain.  4. - Vital signs normal or at patient baseline Yes, VSS.  5. - Safe disposition plan has been identified Yes, home with wife.  Lexiscan completed, results insignificant, can resume diet, lunch ordered. Visiting with wife, comfortable. Waiting for final plan from team.

## 2018-01-27 ENCOUNTER — HOSPITAL ENCOUNTER (EMERGENCY)
Facility: CLINIC | Age: 43
Discharge: HOME | End: 2018-01-28
Attending: INTERNAL MEDICINE | Admitting: INTERNAL MEDICINE

## 2018-01-27 ENCOUNTER — APPOINTMENT (OUTPATIENT)
Dept: GENERAL RADIOLOGY | Facility: CLINIC | Age: 43
End: 2018-01-27
Attending: INTERNAL MEDICINE

## 2018-01-27 DIAGNOSIS — J10.1 INFLUENZA A: ICD-10-CM

## 2018-01-27 LAB
ALBUMIN SERPL-MCNC: 3.3 G/DL (ref 3.4–5)
ALP SERPL-CCNC: 68 U/L (ref 40–150)
ALT SERPL W P-5'-P-CCNC: 48 U/L (ref 0–70)
ANION GAP SERPL CALCULATED.3IONS-SCNC: 8 MMOL/L (ref 3–14)
AST SERPL W P-5'-P-CCNC: 39 U/L (ref 0–45)
BASOPHILS # BLD AUTO: 0 10E9/L (ref 0–0.2)
BASOPHILS NFR BLD AUTO: 0.5 %
BILIRUB SERPL-MCNC: 0.6 MG/DL (ref 0.2–1.3)
BUN SERPL-MCNC: 18 MG/DL (ref 7–30)
CALCIUM SERPL-MCNC: 8.2 MG/DL (ref 8.5–10.1)
CHLORIDE SERPL-SCNC: 106 MMOL/L (ref 94–109)
CO2 SERPL-SCNC: 22 MMOL/L (ref 20–32)
CREAT SERPL-MCNC: 1 MG/DL (ref 0.66–1.25)
CRP SERPL-MCNC: 31 MG/L (ref 0–8)
DIFFERENTIAL METHOD BLD: NORMAL
EOSINOPHIL # BLD AUTO: 0.2 10E9/L (ref 0–0.7)
EOSINOPHIL NFR BLD AUTO: 2.1 %
ERYTHROCYTE [DISTWIDTH] IN BLOOD BY AUTOMATED COUNT: 13.9 % (ref 10–15)
FLUAV+FLUBV AG SPEC QL: NEGATIVE
FLUAV+FLUBV AG SPEC QL: POSITIVE
GFR SERPL CREATININE-BSD FRML MDRD: 81 ML/MIN/1.7M2
GLUCOSE SERPL-MCNC: 150 MG/DL (ref 70–99)
HCT VFR BLD AUTO: 46.2 % (ref 40–53)
HGB BLD-MCNC: 15.4 G/DL (ref 13.3–17.7)
IMM GRANULOCYTES # BLD: 0 10E9/L (ref 0–0.4)
IMM GRANULOCYTES NFR BLD: 0.1 %
LYMPHOCYTES # BLD AUTO: 0.8 10E9/L (ref 0.8–5.3)
LYMPHOCYTES NFR BLD AUTO: 9.2 %
MAGNESIUM SERPL-MCNC: 1.7 MG/DL (ref 1.6–2.3)
MCH RBC QN AUTO: 28.1 PG (ref 26.5–33)
MCHC RBC AUTO-ENTMCNC: 33.3 G/DL (ref 31.5–36.5)
MCV RBC AUTO: 84 FL (ref 78–100)
MONOCYTES # BLD AUTO: 0.8 10E9/L (ref 0–1.3)
MONOCYTES NFR BLD AUTO: 9.3 %
NEUTROPHILS # BLD AUTO: 6.5 10E9/L (ref 1.6–8.3)
NEUTROPHILS NFR BLD AUTO: 78.8 %
NRBC # BLD AUTO: 0 10*3/UL
NRBC BLD AUTO-RTO: 0 /100
NT-PROBNP SERPL-MCNC: 125 PG/ML (ref 0–450)
PLATELET # BLD AUTO: 252 10E9/L (ref 150–450)
POTASSIUM SERPL-SCNC: 4 MMOL/L (ref 3.4–5.3)
PROT SERPL-MCNC: 7 G/DL (ref 6.8–8.8)
RBC # BLD AUTO: 5.49 10E12/L (ref 4.4–5.9)
SODIUM SERPL-SCNC: 136 MMOL/L (ref 133–144)
SPECIMEN SOURCE: ABNORMAL
TROPONIN I SERPL-MCNC: <0.015 UG/L (ref 0–0.04)
WBC # BLD AUTO: 8.2 10E9/L (ref 4–11)

## 2018-01-27 PROCEDURE — 87804 INFLUENZA ASSAY W/OPTIC: CPT | Performed by: INTERNAL MEDICINE

## 2018-01-27 PROCEDURE — 93010 ELECTROCARDIOGRAM REPORT: CPT | Mod: Z6 | Performed by: INTERNAL MEDICINE

## 2018-01-27 PROCEDURE — 25000125 ZZHC RX 250: Performed by: INTERNAL MEDICINE

## 2018-01-27 PROCEDURE — 85025 COMPLETE CBC W/AUTO DIFF WBC: CPT | Performed by: INTERNAL MEDICINE

## 2018-01-27 PROCEDURE — 84484 ASSAY OF TROPONIN QUANT: CPT | Performed by: INTERNAL MEDICINE

## 2018-01-27 PROCEDURE — 71046 X-RAY EXAM CHEST 2 VIEWS: CPT

## 2018-01-27 PROCEDURE — 99285 EMERGENCY DEPT VISIT HI MDM: CPT | Mod: 25 | Performed by: INTERNAL MEDICINE

## 2018-01-27 PROCEDURE — 85610 PROTHROMBIN TIME: CPT | Performed by: INTERNAL MEDICINE

## 2018-01-27 PROCEDURE — 94640 AIRWAY INHALATION TREATMENT: CPT | Performed by: INTERNAL MEDICINE

## 2018-01-27 PROCEDURE — 86140 C-REACTIVE PROTEIN: CPT | Performed by: INTERNAL MEDICINE

## 2018-01-27 PROCEDURE — 99284 EMERGENCY DEPT VISIT MOD MDM: CPT | Mod: 25 | Performed by: INTERNAL MEDICINE

## 2018-01-27 PROCEDURE — 83880 ASSAY OF NATRIURETIC PEPTIDE: CPT | Performed by: INTERNAL MEDICINE

## 2018-01-27 PROCEDURE — 80053 COMPREHEN METABOLIC PANEL: CPT | Performed by: INTERNAL MEDICINE

## 2018-01-27 PROCEDURE — 93005 ELECTROCARDIOGRAM TRACING: CPT | Performed by: INTERNAL MEDICINE

## 2018-01-27 PROCEDURE — 85379 FIBRIN DEGRADATION QUANT: CPT | Performed by: INTERNAL MEDICINE

## 2018-01-27 PROCEDURE — 83735 ASSAY OF MAGNESIUM: CPT | Performed by: INTERNAL MEDICINE

## 2018-01-27 PROCEDURE — 87807 RSV ASSAY W/OPTIC: CPT | Performed by: INTERNAL MEDICINE

## 2018-01-27 RX ORDER — IPRATROPIUM BROMIDE AND ALBUTEROL SULFATE 2.5; .5 MG/3ML; MG/3ML
3 SOLUTION RESPIRATORY (INHALATION) ONCE
Status: COMPLETED | OUTPATIENT
Start: 2018-01-27 | End: 2018-01-27

## 2018-01-27 RX ORDER — OSELTAMIVIR PHOSPHATE 75 MG/1
75 CAPSULE ORAL ONCE
Status: COMPLETED | OUTPATIENT
Start: 2018-01-27 | End: 2018-01-28

## 2018-01-27 RX ADMIN — IPRATROPIUM BROMIDE AND ALBUTEROL SULFATE 3 ML: .5; 3 SOLUTION RESPIRATORY (INHALATION) at 23:04

## 2018-01-27 ASSESSMENT — ENCOUNTER SYMPTOMS
FATIGUE: 1
LIGHT-HEADEDNESS: 1
CONSTIPATION: 0
NAUSEA: 0
CONFUSION: 0
DIARRHEA: 0
FREQUENCY: 0
ADENOPATHY: 0
COUGH: 1
RHINORRHEA: 1
NUMBNESS: 0
WHEEZING: 1
NECK PAIN: 0
BACK PAIN: 0
SPEECH DIFFICULTY: 0
DIAPHORESIS: 0
FEVER: 0
POLYDIPSIA: 0
WEAKNESS: 0
SORE THROAT: 1
SHORTNESS OF BREATH: 1
VOMITING: 0
PALPITATIONS: 0
ABDOMINAL PAIN: 0
HEADACHES: 1
DYSURIA: 0
CHILLS: 0
TROUBLE SWALLOWING: 0

## 2018-01-27 NOTE — ED AVS SNAPSHOT
[facility], Emergency Department    [address] [address]    Phone:  [phone]                                       Israel Brown   MRN: [id number]    Department:  [facility], Emergency Department   Date of Visit:  1/27/2018           Patient Information     Date Of Birth          1975        Your diagnoses for this visit were:     Influenza A        You were seen by Huang Shine MD.      Follow-up Information     Follow up with Edmund Olson    Specialty:  Family Practice    Contact information:    [facility]  [address] [address]  [phone]          Discharge Instructions       Tamiflu 75 mg twice/ day for 5 days.  Continue your albuterol inhaler 4 times/ day.  Drink extra fluids.  Tylenol 650 mg every 6 hours and/or ibuprofen 600 mg every 6 hours as needed for fever, aches.  Return for fever not improving with tylenol/ibuprofen, worsening shortness of breath, frequent vomiting, dehydration, blood sugar out of control, new or worsening symptoms.    24 Hour Appointment Hotline       To make an appointment at any [facility], call [phone] ([phone]). If you don't have a family doctor or clinic, we will help you find one. [facility] are conveniently located to serve the needs of you and your family.             Review of your medicines      START taking        Dose / Directions Last dose taken    acetaminophen 325 MG tablet   Commonly known as:  TYLENOL   Dose:  650 mg   Quantity:  100 tablet        Take 2 tablets (650 mg) by mouth every 6 hours as needed for mild pain   Refills:  0        ibuprofen 600 MG tablet   Commonly known as:  ADVIL/MOTRIN   Dose:  600 mg   Quantity:  30 tablet        Take 1 tablet (600 mg) by mouth every 6 hours as needed for moderate pain   Refills:  1        oseltamivir 75 MG capsule   Commonly known as:  TAMIFLU   Dose:  75 mg   Quantity:  10 capsule        Take 1 capsule (75  mg) by mouth 2 times daily for 5 days   Refills:  0          Our records show that you are taking the medicines listed below. If these are incorrect, please call your family doctor or clinic.        Dose / Directions Last dose taken    albuterol 108 (90 BASE) MCG/ACT Inhaler   Commonly known as:  PROAIR HFA/PROVENTIL HFA/VENTOLIN HFA   Dose:  2 puff        Inhale 2 puffs into the lungs every 6 hours   Refills:  0        fluticasone 50 MCG/ACT spray   Commonly known as:  FLONASE   Dose:  1 spray        Spray 1 spray in nostril daily   Refills:  0        insulin glargine 100 UNIT/ML injection   Commonly known as:  LANTUS   Dose:  60 Units        Inject 60 Units Subcutaneous every morning   Refills:  0        LYRICA PO        Refills:  0        montelukast 10 MG tablet   Commonly known as:  SINGULAIR   Dose:  10 mg        Take 10 mg by mouth At Bedtime   Refills:  0        omeprazole 20 MG CR capsule   Commonly known as:  priLOSEC   Dose:  20 mg        Take 20 mg by mouth daily   Refills:  0        simvastatin 40 MG tablet   Commonly known as:  ZOCOR   Dose:  40 mg        Take 40 mg by mouth At Bedtime   Refills:  0                Prescriptions were sent or printed at these locations (3 Prescriptions)                   Other Prescriptions                Printed at Department/Unit printer (3 of 3)         oseltamivir (TAMIFLU) 75 MG capsule               acetaminophen (TYLENOL) 325 MG tablet               ibuprofen (ADVIL/MOTRIN) 600 MG tablet                Procedures and tests performed during your visit     BNP    CBC with platelets differential    CRP inflammation    Chest XR,  PA & LAT    Comprehensive metabolic panel    D dimer quantitative    EKG 12-lead, tracing only    INR    Influenza A/B antigen    Magnesium    RSV rapid antigen    Troponin I      Orders Needing Specimen Collection     None      Pending Results     Date and Time Order Name Status Description    1/27/2018 4905 RSV rapid antigen In process      "2018 2254 Chest XR,  PA & LAT Preliminary     2018 2229 EKG 12-lead, tracing only Preliminary             Pending Culture Results     Date and Time Order Name Status Description    2018 2254 RSV rapid antigen In process             Pending Results Instructions     If you had any lab results that were not finalized at the time of your Discharge, you can call the ED Lab Result RN at [phone]. You will be contacted by this team for any positive Lab results or changes in treatment. The nurses are available 7 days a week from 10A to 6:30P.  You can leave a message 24 hours per day and they will return your call.        Thank you for choosing [facility]       Thank you for choosing [facility] for your care. Our goal is always to provide you with excellent care. Hearing back from our patients is one way we can continue to improve our services. Please take a few minutes to complete the written survey that you may receive in the mail after you visit with us. Thank you!        [employer] Information     [employer] lets you send messages to your doctor, view your test results, renew your prescriptions, schedule appointments and more. To sign up, go to [email or web address] . Click on \"Log in\" on the left side of the screen, which will take you to the Welcome page. Then click on \"Sign up Now\" on the right side of the page.     You will be asked to enter the access code listed below, as well as some personal information. Please follow the directions to create your username and password.     Your access code is: [email or web address]  Expires: 2018  4:54 PM     Your access code will  in 90 days. If you need help or a new code, please call your [facility] clinic or [phone].        Care EveryWhere ID     This is your Care EveryWhere ID. This could be used by other organizations to access your [facility] medical records  [id number]        Equal Access to Services     DAYANA GUEVARA AH: Severino Andersen, " jennifer crisostomo, ray redmond. So [facility] [phone].    ATENCIÓN: Si habla español, tiene a wolff disposición servicios gratuitos de asistencia lingüística. Llame al [phone].    We comply with applicable federal civil rights laws and [place] laws. We do not discriminate on the basis of race, color, national origin, age, disability, sex, sexual orientation, or gender identity.            After Visit Summary       This is your record. Keep this with you and show to your community pharmacist(s) and doctor(s) at your next visit.

## 2018-01-27 NOTE — ED AVS SNAPSHOT
[facility], [place], Emergency Department    [address] [place] [address]    Phone:  [phone]                                       Israel Brown   MRN: [id number]    Department:  [facility], Emergency Department   Date of Visit:  1/27/2018           After Visit Summary Signature Page     I have received my discharge instructions, and my questions have been answered. I have discussed any challenges I see with this plan with the nurse or doctor.    ..........................................................................................................................................  Patient/Patient Representative Signature      ..........................................................................................................................................  Patient Representative Print Name and Relationship to Patient    ..................................................               ................................................  Date                                            Time    ..........................................................................................................................................  Reviewed by Signature/Title    ...................................................              ..............................................  Date                                                            Time

## 2018-01-28 VITALS
SYSTOLIC BLOOD PRESSURE: 146 MMHG | WEIGHT: 200 LBS | HEART RATE: 100 BPM | BODY MASS INDEX: 33.28 KG/M2 | OXYGEN SATURATION: 93 % | DIASTOLIC BLOOD PRESSURE: 84 MMHG | TEMPERATURE: 101.2 F

## 2018-01-28 LAB
D DIMER PPP FEU-MCNC: 0.5 UG/ML FEU (ref 0–0.5)
INR PPP: 1.06 (ref 0.86–1.14)
RSV AG SPEC QL: NEGATIVE
SPECIMEN SOURCE: NORMAL

## 2018-01-28 PROCEDURE — 25000132 ZZH RX MED GY IP 250 OP 250 PS 637: Performed by: INTERNAL MEDICINE

## 2018-01-28 PROCEDURE — 25000131 ZZH RX MED GY IP 250 OP 636 PS 637: Performed by: INTERNAL MEDICINE

## 2018-01-28 RX ORDER — IBUPROFEN 600 MG/1
600 TABLET, FILM COATED ORAL EVERY 6 HOURS PRN
Qty: 30 TABLET | Refills: 1 | Status: SHIPPED | OUTPATIENT
Start: 2018-01-28 | End: 2018-11-29

## 2018-01-28 RX ORDER — ACETAMINOPHEN 325 MG/1
650 TABLET ORAL ONCE
Status: COMPLETED | OUTPATIENT
Start: 2018-01-28 | End: 2018-01-28

## 2018-01-28 RX ORDER — ACETAMINOPHEN 325 MG/1
650 TABLET ORAL EVERY 6 HOURS PRN
Qty: 100 TABLET | Refills: 0 | Status: SHIPPED | OUTPATIENT
Start: 2018-01-28 | End: 2018-10-09

## 2018-01-28 RX ORDER — PREDNISOLONE 15 MG/5 ML
30 SOLUTION, ORAL ORAL DAILY
Qty: 50 ML | Refills: 0 | Status: SHIPPED | OUTPATIENT
Start: 2018-01-28 | End: 2018-04-23

## 2018-01-28 RX ORDER — OSELTAMIVIR PHOSPHATE 75 MG/1
75 CAPSULE ORAL 2 TIMES DAILY
Qty: 10 CAPSULE | Refills: 0 | Status: SHIPPED | OUTPATIENT
Start: 2018-01-28 | End: 2018-02-02

## 2018-01-28 RX ORDER — DEXAMETHASONE 4 MG/1
4 TABLET ORAL ONCE
Status: COMPLETED | OUTPATIENT
Start: 2018-01-28 | End: 2018-01-28

## 2018-01-28 RX ADMIN — DEXAMETHASONE 4 MG: 4 TABLET ORAL at 00:40

## 2018-01-28 RX ADMIN — OSELTAMIVIR PHOSPHATE 75 MG: 75 CAPSULE ORAL at 00:01

## 2018-01-28 RX ADMIN — ACETAMINOPHEN 650 MG: 325 TABLET, FILM COATED ORAL at 00:29

## 2018-01-28 NOTE — ED NOTES
Pt presents to ED with C/O SOB. States he has asthma, has been using inhalers without relief. Reports SOB and cough since yesterday evening. Denies chest pain, sore throat, body aches. Temp: 99.1  SpO2 95% BP: 125/61

## 2018-01-28 NOTE — DISCHARGE INSTRUCTIONS
Tamiflu 75 mg twice/ day for 5 days.  Continue your albuterol inhaler 4 times/ day.  Drink extra fluids.  Prednisolone 10 ml daily for 5 days.  Tylenol 650 mg every 6 hours and/or ibuprofen 600 mg every 6 hours as needed for fever, aches.  Return for fever not improving with tylenol/ibuprofen, worsening shortness of breath, frequent vomiting, dehydration, blood sugar out of control, new or worsening symptoms.

## 2018-01-28 NOTE — ED PROVIDER NOTES
History     Chief Complaint   Patient presents with     Shortness of Breath     HPI  Israel Brown is a 42 year old male who presents with shortness of breath and cough. He has a history of asthma, rheumatoid arthritis, type 2 DM, and peripheral neuropathy. He was admitted to the ED observation unit 4 days ago with atypical chest pain and dyspnea with light exertion. At that time he had negative influenza antigen. He had normal serial troponin and nuclear medicine Lexiscan without evidence of ischemia. He has continued to be short of breath. Today he was out running errands and felt continually short of breath. He used his albuterol inhaler up to 20 times without improvement. His girlfriend was recently hospitalized with respiratory issues (RSV exacerbating interstitial lung disease). He has had headaches, myalgias, mild rhinorrhea. He has had no further chest pain. His cough is non productive. He has no nausea, vomiting, abdominal pain, leg pain or swelling. He has not checked his blood sugar today.    PAST MEDICAL HISTORY:   Past Medical History:   Diagnosis Date     Asthma      Diabetes mellitus (H)      Neuropathy      RA (rheumatoid arthritis) (H)        PAST SURGICAL HISTORY:   Past Surgical History:   Procedure Laterality Date     REALIGN PATELLA         FAMILY HISTORY: No family history on file.    SOCIAL HISTORY:   Social History   Substance Use Topics     Smoking status: Never Smoker     Smokeless tobacco: Never Used     Alcohol use No         I have reviewed the Medications, Allergies, Past Medical and Surgical History, and Social History in the Epic system.    Review of Systems   Constitutional: Positive for fatigue. Negative for chills, diaphoresis and fever.   HENT: Positive for rhinorrhea and sore throat. Negative for congestion, ear pain and trouble swallowing.    Eyes: Negative for visual disturbance.   Respiratory: Positive for cough, shortness of breath and wheezing.    Cardiovascular:  Negative for chest pain, palpitations and leg swelling.   Gastrointestinal: Negative for abdominal pain, constipation, diarrhea, nausea and vomiting.   Endocrine: Negative for polydipsia and polyuria.   Genitourinary: Negative for dysuria and frequency.   Musculoskeletal: Negative for back pain and neck pain.   Skin: Negative for rash.   Neurological: Positive for light-headedness and headaches. Negative for speech difficulty, weakness and numbness.   Hematological: Negative for adenopathy.   Psychiatric/Behavioral: Negative for confusion.       Physical Exam   BP: 125/61  Pulse: 110  Heart Rate: 107  Temp: 99.1  F (37.3  C)  Weight: 90.7 kg (200 lb)  SpO2: 95 %      Physical Exam   Constitutional: He is oriented to person, place, and time. He appears well-developed and well-nourished. No distress.   HENT:   Head: Normocephalic and atraumatic.   Right Ear: Hearing, tympanic membrane and external ear normal.   Left Ear: Hearing, tympanic membrane and external ear normal.   Nose: Mucosal edema present.   Mouth/Throat: Uvula is midline, oropharynx is clear and moist and mucous membranes are normal. No oropharyngeal exudate.   Eyes: EOM are normal. Pupils are equal, round, and reactive to light. No scleral icterus.   Neck: Normal range of motion. Neck supple. No JVD present.   Cardiovascular: Regular rhythm, S2 normal and normal pulses.   No extrasystoles are present. Tachycardia present.  Exam reveals no gallop and no friction rub.    No murmur heard.  Pulmonary/Chest: Effort normal. He has no wheezes. He has rhonchi. He has no rales.   Abdominal: Soft. Bowel sounds are normal. There is no tenderness. There is no rebound and no guarding.   Musculoskeletal: He exhibits no edema or tenderness.   Lymphadenopathy:     He has no cervical adenopathy.   Neurological: He is alert and oriented to person, place, and time. He displays normal reflexes. No cranial nerve deficit. He exhibits normal muscle tone. Coordination normal.    Skin: Skin is warm and dry.   Psychiatric: He has a normal mood and affect. His behavior is normal.   Nursing note and vitals reviewed.      ED Course     ED Course     Procedures             EKG Interpretation:      Interpreted by ELENA ANDERSON  Time reviewed: 2232  Symptoms at time of EKG: dyspnea   Rhythm: sinus tachycardia  Rate: 110-120  Axis: Normal  Ectopy: PAC's, some wandering atrial focus  Conduction: normal  ST Segments/ T Waves: No ST-T wave changes and No acute ischemic changes  Q Waves: none  Comparison to prior: Unchanged from 1/23/18    Clinical Impression: sinus tachycardia    Labs/Imaging    Results for orders placed or performed during the hospital encounter of 01/27/18 (from the past 24 hour(s))   EKG 12-lead, tracing only   Result Value Ref Range    Interpretation ECG Click View Image link to view waveform and result    CBC with platelets differential   Result Value Ref Range    WBC 8.2 4.0 - 11.0 10e9/L    RBC Count 5.49 4.4 - 5.9 10e12/L    Hemoglobin 15.4 13.3 - 17.7 g/dL    Hematocrit 46.2 40.0 - 53.0 %    MCV 84 78 - 100 fl    MCH 28.1 26.5 - 33.0 pg    MCHC 33.3 31.5 - 36.5 g/dL    RDW 13.9 10.0 - 15.0 %    Platelet Count 252 150 - 450 10e9/L    Diff Method Automated Method     % Neutrophils 78.8 %    % Lymphocytes 9.2 %    % Monocytes 9.3 %    % Eosinophils 2.1 %    % Basophils 0.5 %    % Immature Granulocytes 0.1 %    Nucleated RBCs 0 0 /100    Absolute Neutrophil 6.5 1.6 - 8.3 10e9/L    Absolute Lymphocytes 0.8 0.8 - 5.3 10e9/L    Absolute Monocytes 0.8 0.0 - 1.3 10e9/L    Absolute Eosinophils 0.2 0.0 - 0.7 10e9/L    Absolute Basophils 0.0 0.0 - 0.2 10e9/L    Abs Immature Granulocytes 0.0 0 - 0.4 10e9/L    Absolute Nucleated RBC 0.0    Comprehensive metabolic panel   Result Value Ref Range    Sodium 136 133 - 144 mmol/L    Potassium 4.0 3.4 - 5.3 mmol/L    Chloride 106 94 - 109 mmol/L    Carbon Dioxide 22 20 - 32 mmol/L    Anion Gap 8 3 - 14 mmol/L    Glucose 150 (H) 70 - 99 mg/dL     Urea Nitrogen 18 7 - 30 mg/dL    Creatinine 1.00 0.66 - 1.25 mg/dL    GFR Estimate 81 >60 mL/min/1.7m2    GFR Estimate If Black >90 >60 mL/min/1.7m2    Calcium 8.2 (L) 8.5 - 10.1 mg/dL    Bilirubin Total 0.6 0.2 - 1.3 mg/dL    Albumin 3.3 (L) 3.4 - 5.0 g/dL    Protein Total 7.0 6.8 - 8.8 g/dL    Alkaline Phosphatase 68 40 - 150 U/L    ALT 48 0 - 70 U/L    AST 39 0 - 45 U/L   CRP inflammation   Result Value Ref Range    CRP Inflammation 31.0 (H) 0.0 - 8.0 mg/L   BNP   Result Value Ref Range    N-Terminal Pro BNP Inpatient 125 0 - 450 pg/mL   Magnesium   Result Value Ref Range    Magnesium 1.7 1.6 - 2.3 mg/dL   Troponin I   Result Value Ref Range    Troponin I ES <0.015 0.000 - 0.045 ug/L   Influenza A/B antigen   Result Value Ref Range    Influenza A/B Agn Specimen Nasopharyngeal     Influenza A Positive (A) NEG^Negative    Influenza B Negative NEG^Negative   Chest XR,  PA & LAT    Impression    Impression: No acute airspace disease.    INR   Result Value Ref Range    INR 1.06 0.86 - 1.14   D dimer quantitative   Result Value Ref Range    D Dimer 0.5 0.0 - 0.50 ug/ml FEU       Assessments & Plan (with Medical Decision Making)   Impression:  Young male with a history of asthma and type 2 DM presents with cough, fever, dyspnea and myalgias. He had negative influenza screen 4 days ago. Today influenza A antigen is positive and he spiked a fever to 101.5 in the ED. He has no infiltrates on CXR. He is not wheezing on exam.He has no acute changes on EKG and troponin and d-dimer are normal. He had recent nuclear medicine cardiac Lexiscan without ischemic changes. He is mildly tachycardic, but otherwise does not appear septic. Given his risk factors for influenza complications including diabetes, obesity and asthma, I will treat him with Tamiflu. He gets hiccups with steroids, but is not allergic I will put him on 30 mg prednisolone daily for 5 days.    I have reviewed the nursing notes.    I have reviewed the findings,  diagnosis, plan and need for follow up with the patient.    New Prescriptions    ACETAMINOPHEN (TYLENOL) 325 MG TABLET    Take 2 tablets (650 mg) by mouth every 6 hours as needed for mild pain    IBUPROFEN (ADVIL/MOTRIN) 600 MG TABLET    Take 1 tablet (600 mg) by mouth every 6 hours as needed for moderate pain    OSELTAMIVIR (TAMIFLU) 75 MG CAPSULE    Take 1 capsule (75 mg) by mouth 2 times daily for 5 days    PREDNISOLONE (ORAPRED/PRELONE) 15 MG/5ML SOLUTION    Take 10 mLs (30 mg) by mouth daily       Final diagnoses:   Influenza A       1/27/2018   [facility], [place], EMERGENCY DEPARTMENT     Huang Shine MD  01/28/18 0035       Huang Shine MD  01/28/18 0043

## 2018-01-29 LAB — INTERPRETATION ECG - MUSE: NORMAL

## 2018-04-23 ENCOUNTER — HOSPITAL ENCOUNTER (EMERGENCY)
Facility: CLINIC | Age: 43
Discharge: HOME | End: 2018-04-23
Attending: EMERGENCY MEDICINE | Admitting: EMERGENCY MEDICINE
Payer: MEDICAID

## 2018-04-23 ENCOUNTER — APPOINTMENT (OUTPATIENT)
Dept: GENERAL RADIOLOGY | Facility: CLINIC | Age: 43
End: 2018-04-23
Attending: EMERGENCY MEDICINE
Payer: MEDICAID

## 2018-04-23 VITALS
BODY MASS INDEX: 34.49 KG/M2 | OXYGEN SATURATION: 98 % | HEART RATE: 82 BPM | SYSTOLIC BLOOD PRESSURE: 130 MMHG | RESPIRATION RATE: 18 BRPM | TEMPERATURE: 98.3 F | DIASTOLIC BLOOD PRESSURE: 82 MMHG | WEIGHT: 207.23 LBS

## 2018-04-23 DIAGNOSIS — R06.02 SHORTNESS OF BREATH: ICD-10-CM

## 2018-04-23 DIAGNOSIS — J98.11 MILD BIBASILAR ATELECTASIS: ICD-10-CM

## 2018-04-23 LAB
ANION GAP SERPL CALCULATED.3IONS-SCNC: 6 MMOL/L (ref 3–14)
BASOPHILS # BLD AUTO: 0 10E9/L (ref 0–0.2)
BASOPHILS NFR BLD AUTO: 0.6 %
BUN SERPL-MCNC: 14 MG/DL (ref 7–30)
CALCIUM SERPL-MCNC: 8.3 MG/DL (ref 8.5–10.1)
CHLORIDE SERPL-SCNC: 105 MMOL/L (ref 94–109)
CO2 SERPL-SCNC: 30 MMOL/L (ref 20–32)
CREAT SERPL-MCNC: 1.07 MG/DL (ref 0.66–1.25)
DIFFERENTIAL METHOD BLD: NORMAL
EOSINOPHIL # BLD AUTO: 0.4 10E9/L (ref 0–0.7)
EOSINOPHIL NFR BLD AUTO: 5.4 %
ERYTHROCYTE [DISTWIDTH] IN BLOOD BY AUTOMATED COUNT: 14.4 % (ref 10–15)
GFR SERPL CREATININE-BSD FRML MDRD: 75 ML/MIN/1.7M2
GLUCOSE SERPL-MCNC: 148 MG/DL (ref 70–99)
HCT VFR BLD AUTO: 46.2 % (ref 40–53)
HGB BLD-MCNC: 15.8 G/DL (ref 13.3–17.7)
IMM GRANULOCYTES # BLD: 0 10E9/L (ref 0–0.4)
IMM GRANULOCYTES NFR BLD: 0.3 %
LYMPHOCYTES # BLD AUTO: 2.1 10E9/L (ref 0.8–5.3)
LYMPHOCYTES NFR BLD AUTO: 29 %
MCH RBC QN AUTO: 28.6 PG (ref 26.5–33)
MCHC RBC AUTO-ENTMCNC: 34.2 G/DL (ref 31.5–36.5)
MCV RBC AUTO: 84 FL (ref 78–100)
MONOCYTES # BLD AUTO: 0.4 10E9/L (ref 0–1.3)
MONOCYTES NFR BLD AUTO: 5.5 %
NEUTROPHILS # BLD AUTO: 4.3 10E9/L (ref 1.6–8.3)
NEUTROPHILS NFR BLD AUTO: 59.2 %
NRBC # BLD AUTO: 0 10*3/UL
NRBC BLD AUTO-RTO: 0 /100
PLATELET # BLD AUTO: 238 10E9/L (ref 150–450)
POTASSIUM SERPL-SCNC: 3.4 MMOL/L (ref 3.4–5.3)
RBC # BLD AUTO: 5.52 10E12/L (ref 4.4–5.9)
SODIUM SERPL-SCNC: 141 MMOL/L (ref 133–144)
WBC # BLD AUTO: 7.2 10E9/L (ref 4–11)

## 2018-04-23 PROCEDURE — 96374 THER/PROPH/DIAG INJ IV PUSH: CPT | Performed by: EMERGENCY MEDICINE

## 2018-04-23 PROCEDURE — 94640 AIRWAY INHALATION TREATMENT: CPT | Performed by: EMERGENCY MEDICINE

## 2018-04-23 PROCEDURE — 80048 BASIC METABOLIC PNL TOTAL CA: CPT | Performed by: EMERGENCY MEDICINE

## 2018-04-23 PROCEDURE — 25000125 ZZHC RX 250: Performed by: EMERGENCY MEDICINE

## 2018-04-23 PROCEDURE — 93005 ELECTROCARDIOGRAM TRACING: CPT | Performed by: EMERGENCY MEDICINE

## 2018-04-23 PROCEDURE — 93010 ELECTROCARDIOGRAM REPORT: CPT | Mod: Z6 | Performed by: EMERGENCY MEDICINE

## 2018-04-23 PROCEDURE — 99284 EMERGENCY DEPT VISIT MOD MDM: CPT | Mod: 25 | Performed by: EMERGENCY MEDICINE

## 2018-04-23 PROCEDURE — 99285 EMERGENCY DEPT VISIT HI MDM: CPT | Mod: 25 | Performed by: EMERGENCY MEDICINE

## 2018-04-23 PROCEDURE — 85025 COMPLETE CBC W/AUTO DIFF WBC: CPT | Performed by: EMERGENCY MEDICINE

## 2018-04-23 PROCEDURE — 25000128 H RX IP 250 OP 636: Performed by: EMERGENCY MEDICINE

## 2018-04-23 PROCEDURE — 71046 X-RAY EXAM CHEST 2 VIEWS: CPT

## 2018-04-23 RX ORDER — ALBUTEROL SULFATE 0.83 MG/ML
2.5 SOLUTION RESPIRATORY (INHALATION) ONCE
Status: COMPLETED | OUTPATIENT
Start: 2018-04-23 | End: 2018-04-23

## 2018-04-23 RX ORDER — IPRATROPIUM BROMIDE AND ALBUTEROL SULFATE 2.5; .5 MG/3ML; MG/3ML
3 SOLUTION RESPIRATORY (INHALATION) ONCE
Status: COMPLETED | OUTPATIENT
Start: 2018-04-23 | End: 2018-04-23

## 2018-04-23 RX ORDER — KETOROLAC TROMETHAMINE 30 MG/ML
30 INJECTION, SOLUTION INTRAMUSCULAR; INTRAVENOUS ONCE
Status: COMPLETED | OUTPATIENT
Start: 2018-04-23 | End: 2018-04-23

## 2018-04-23 RX ADMIN — ALBUTEROL SULFATE 2.5 MG: 2.5 SOLUTION RESPIRATORY (INHALATION) at 18:58

## 2018-04-23 RX ADMIN — IPRATROPIUM BROMIDE AND ALBUTEROL SULFATE 3 ML: .5; 3 SOLUTION RESPIRATORY (INHALATION) at 19:59

## 2018-04-23 RX ADMIN — KETOROLAC TROMETHAMINE 30 MG: 30 INJECTION, SOLUTION INTRAMUSCULAR at 19:59

## 2018-04-23 ASSESSMENT — ENCOUNTER SYMPTOMS
VOMITING: 0
COUGH: 1
FEVER: 0
SORE THROAT: 0
RHINORRHEA: 0
WHEEZING: 1
ABDOMINAL PAIN: 1
CHILLS: 0

## 2018-04-23 NOTE — ED AVS SNAPSHOT
[facility], [place], Emergency Department    [address] [place] [address]    Phone:  [phone]                                       Israel Brown   MRN: [id number]    Department:  [facility], Emergency Department   Date of Visit:  4/23/2018           After Visit Summary Signature Page     I have received my discharge instructions, and my questions have been answered. I have discussed any challenges I see with this plan with the nurse or doctor.    ..........................................................................................................................................  Patient/Patient Representative Signature      ..........................................................................................................................................  Patient Representative Print Name and Relationship to Patient    ..................................................               ................................................  Date                                            Time    ..........................................................................................................................................  Reviewed by Signature/Title    ...................................................              ..............................................  Date                                                            Time

## 2018-04-23 NOTE — ED TRIAGE NOTES
Pt presents ambulatory to triage from home with wife. Pt states for past week has had chest tightness, SOB and dyspnea. Pt has hx asthma, has not taken asthma medications and has not been taking inhalers.

## 2018-04-23 NOTE — ED PROVIDER NOTES
"  History     Chief Complaint   Patient presents with     Shortness of Breath     The history is provided by the patient.     Israel Brown is a 42 year old male with a history of asthma, diabetes, neuropathy, and RA who presents for evaluation of shortness of breath. Patient complains for the past 1-1.5 weeks he has had a dry cough with \"burning lung\" pain. He [place] [facility] [place] and spoke with a nurse who recommended the patient be evaluated within 24 hours, so he elected to present here to the ED for evaluation. He has been wheezing when he is laying flat, but has not noticed wheezing when he is up and being active, and states this wheezing will resolve after a short time and he will be able to sleep. He also reports mild abdominal pain. He denies fever, chills, vomiting, rhinorrhea, congestion, or sore throat. He does report a history of asthma, and is prescribed a rescue albuterol inhaler, but admits he has not used this since last week, and has not been using it regularly. He is also supposed to be on Singulair, but has not taken this since one year ago after he ran out and his insurance lapsed. He has bilateral lower extremity pain and swelling which is chronic due to neuropathy and is unchanged from baseline. No recent long travel. No history of DVT or PE. He is a non-smoker, and is not regularly around contacts who smoke.     I have reviewed the Medications, Allergies, Past Medical and Surgical History, and Social History in the [facility] system.  Past Medical History:   Diagnosis Date     Asthma      Diabetes mellitus (H)      Neuropathy      RA (rheumatoid arthritis) (H)        Past Surgical History:   Procedure Laterality Date     REALIGN PATELLA         No family history on file.    Social History   Substance Use Topics     Smoking status: Never Smoker     Smokeless tobacco: Never Used     Alcohol use No       No current facility-administered medications for this " encounter.      Current Outpatient Prescriptions   Medication     insulin glargine (LANTUS) 100 UNIT/ML injection     acetaminophen (TYLENOL) 325 MG tablet     albuterol (PROAIR HFA, PROVENTIL HFA, VENTOLIN HFA) 108 (90 BASE) MCG/ACT inhaler     fluticasone (FLONASE) 50 MCG/ACT spray     ibuprofen (ADVIL/MOTRIN) 600 MG tablet     montelukast (SINGULAIR) 10 MG tablet     omeprazole (PRILOSEC) 20 MG CR capsule     Pregabalin (LYRICA PO)     simvastatin (ZOCOR) 40 MG tablet        Allergies   Allergen Reactions     Cortisone      Hmg-Coa-R Inhibitors      Penicillins        Review of Systems   Constitutional: Negative for chills and fever.   HENT: Negative for congestion, rhinorrhea and sore throat.    Respiratory: Positive for cough (dry) and wheezing.    Cardiovascular: Negative for chest pain and leg swelling.   Gastrointestinal: Positive for abdominal pain (mild). Negative for vomiting.   All other systems reviewed and are negative.      Physical Exam   BP: 139/79  Pulse: 82  Heart Rate: 89  Temp: 98.3  F (36.8  C)  Resp: 16  Weight: 94 kg (207 lb 3.7 oz)  SpO2: 98 %      Physical Exam   Constitutional: No distress.   Obese adult male, alert, cooperative, NAD.  Frequently yawning, seems rather disinterested in interview   HENT:   Head: Normocephalic and atraumatic.   Mouth/Throat: Oropharynx is clear and moist. No oropharyngeal exudate.   Eyes: Pupils are equal, round, and reactive to light. No scleral icterus.   Cardiovascular: Normal rate, regular rhythm, normal heart sounds and intact distal pulses.    No murmur heard.  Pulmonary/Chest: No respiratory distress. He has wheezes. He has no rales. He exhibits tenderness.   Somewhat coarse breath sounds bilaterally with occasional wheezing.  Not taking overly deep breaths.  Somewhat shallow  inspiratory effort    Chest wall tenderness to palpation over right lateral and anterior costal margin.   Abdominal: Soft. Bowel sounds are normal. There is no tenderness.    Musculoskeletal: He exhibits no edema or tenderness.   Skin: Skin is warm. No rash noted. He is not diaphoretic.   Nursing note and vitals reviewed.      ED Course     ED Course     Procedures       6:26 PM  The patient was seen and examined by Dr. Cooper in Room 12.          EKG Interpretation:      Interpreted by Marya Cooper  Time reviewed: 1840  Symptoms at time of EKG: chest pain   Rhythm: normal sinus   Rate: normal  Axis: normal  Ectopy: none  Conduction: normal  ST Segments/ T Waves: No ST-T wave changes  Q Waves: none  Comparison to prior: Unchanged    Clinical Impression: normal EKG          Critical Care time:  none             Results for orders placed or performed during the hospital encounter of 04/23/18 (from the past 24 hour(s))   EKG 12-lead, tracing only   Result Value Ref Range    Interpretation ECG Click View Image link to view waveform and result    XR Chest 2 Views    Narrative    Preliminary     Impression    impression:   No acute airspace disease. Persistently low lung volumes and left  basilar atelectasis again noted.  Findings discussed with senior resident. Full report to follow.   CBC with platelets differential   Result Value Ref Range    WBC 7.2 4.0 - 11.0 10e9/L    RBC Count 5.52 4.4 - 5.9 10e12/L    Hemoglobin 15.8 13.3 - 17.7 g/dL    Hematocrit 46.2 40.0 - 53.0 %    MCV 84 78 - 100 fl    MCH 28.6 26.5 - 33.0 pg    MCHC 34.2 31.5 - 36.5 g/dL    RDW 14.4 10.0 - 15.0 %    Platelet Count 238 150 - 450 10e9/L    Diff Method Automated Method     % Neutrophils 59.2 %    % Lymphocytes 29.0 %    % Monocytes 5.5 %    % Eosinophils 5.4 %    % Basophils 0.6 %    % Immature Granulocytes 0.3 %    Nucleated RBCs 0 0 /100    Absolute Neutrophil 4.3 1.6 - 8.3 10e9/L    Absolute Lymphocytes 2.1 0.8 - 5.3 10e9/L    Absolute Monocytes 0.4 0.0 - 1.3 10e9/L    Absolute Eosinophils 0.4 0.0 - 0.7 10e9/L    Absolute Basophils 0.0 0.0 - 0.2 10e9/L    Abs Immature Granulocytes 0.0 0 - 0.4 10e9/L     "Absolute Nucleated RBC 0.0    Basic metabolic panel   Result Value Ref Range    Sodium 141 133 - 144 mmol/L    Potassium 3.4 3.4 - 5.3 mmol/L    Chloride 105 94 - 109 mmol/L    Carbon Dioxide 30 20 - 32 mmol/L    Anion Gap 6 3 - 14 mmol/L    Glucose 148 (H) 70 - 99 mg/dL    Urea Nitrogen 14 7 - 30 mg/dL    Creatinine 1.07 0.66 - 1.25 mg/dL    GFR Estimate 75 >60 mL/min/1.7m2    GFR Estimate If Black >90 >60 mL/min/1.7m2    Calcium 8.3 (L) 8.5 - 10.1 mg/dL              Assessments & Plan (with Medical Decision Making)   This is a 42-year-old male with a prior history of diabetes as well as asthma who presents to the ED today complaining of shortness of breath.  He complains of a \"lung burning\" pain which has been going on for at least one week.  He has had a non-productive cough and is also noticing some shortness of breath when lying flat.  He has some right-sided rib pain associated with this.    The patient is saturating 98% on room air.  He seems rather sleepy and is yawning throughout much of the interview.  Speaking in full sentences.  Has an occasional cough.    Need to consider multiple potential etiologies.  Asthma is possible, patient does have some coarse breath sounds and occasional wheezing.  He is having some difficulty taking deep breaths due to some rib pain.  I rather doubt pneumothorax though that is always a possibility.  Additionally need to consider the possibility of ACS or extrapulmonary cause of pain.  I think this is very unlikely based on the description based on the fact that he had a nuclear medicine Lexiscan done in January which did not show any abnormality at that point.    We did do a peak flow on him, pre-and post-neb peak flow which is 250.  There was not a lot of change in peak flow between prior to and after nebulizer treatments.  We did give him a DuoNeb nebulizer treatment in addition to an albuterol nebulizer treatment.  We did ambulate him here in the emergency department and " he had no desaturation with this..  EKG today is within normal limits. Chest x-ray today is remarkable for bibasilar atelectasis but no sign of pneumonia or other acute abnormalities.    Discussed with patient the possibility that this is related to asthma.  Explained that prednisone would be beneficial for him if there is an inflammatory component.  The patient was reluctant to consider a course of prednisone due to the effect it would have on his blood sugar control.  I advised that he talk with his primary clinic about this, he has an upcoming appointment on Friday, in 4 days.  I offered to refill his inhaler which he stated he did not need and he would discuss this with his primary clinic on Friday.  Suspect that his shortness of breath is probably multifactorial, likely harshly related to asthma,  partially related to deconditioning perhaps partially related to atelectasis.  Follow-up with primary as needed.  Patient verbalizes understanding.    I have reviewed the nursing notes.    I have reviewed the findings, diagnosis, plan and need for follow up with the patient.    Discharge Medication List as of 4/23/2018  9:22 PM          Final diagnoses:   Shortness of breath - likely 2/2 asthma   Mild bibasilar atelectasis   I, Meg Calderón, am serving as a trained medical scribe to document services personally performed by Marya Cooper MD, based on the provider's statements to me.   IMarya MD, was physically present and have reviewed and verified the accuracy of this note documented by Meg Calderón.      4/23/2018   [facility], [place], EMERGENCY DEPARTMENT     Marya Cooper MD  04/23/18 3710

## 2018-04-24 LAB — INTERPRETATION ECG - MUSE: NORMAL

## 2018-04-24 NOTE — DISCHARGE INSTRUCTIONS
You have been seen in the ER for shortness of breath and cough.  Your lab work is normal.  Your EKG was normal.  Your X ray does not show any sign of acute infection or pneumonia.  You do have low lung volumes on the X ray, meaning that there is partial collapse of the tiny air sacs in the bottom of the lungs.  We recommend that you focus on taking deep breaths to open up/expand your lungs. Your asthma may be playing a part in this.  We recommend that you use your inhaler and we offered a prescription for prednisone, but you have indicated to us that you would prefer to not take that right now due to the side effect of high blood sugars. We recommend that you talk with your clinic about this on Friday.  Come back to the ER if you have any worsening symptoms.

## 2018-06-11 ENCOUNTER — HOSPITAL ENCOUNTER (EMERGENCY)
Facility: CLINIC | Age: 43
Discharge: HOME | End: 2018-06-12
Attending: EMERGENCY MEDICINE | Admitting: EMERGENCY MEDICINE
Payer: COMMERCIAL

## 2018-06-11 ENCOUNTER — HOSPITAL ENCOUNTER (EMERGENCY)
Facility: CLINIC | Age: 43
Discharge: HOME | End: 2018-06-11
Attending: NURSE PRACTITIONER | Admitting: NURSE PRACTITIONER
Payer: COMMERCIAL

## 2018-06-11 VITALS
WEIGHT: 200 LBS | DIASTOLIC BLOOD PRESSURE: 93 MMHG | OXYGEN SATURATION: 95 % | RESPIRATION RATE: 16 BRPM | TEMPERATURE: 98.5 F | HEIGHT: 65 IN | SYSTOLIC BLOOD PRESSURE: 146 MMHG | BODY MASS INDEX: 33.32 KG/M2

## 2018-06-11 DIAGNOSIS — E11.8 TYPE 2 DIABETES MELLITUS WITH COMPLICATION, UNSPECIFIED LONG TERM INSULIN USE STATUS: ICD-10-CM

## 2018-06-11 DIAGNOSIS — R73.9 HYPERGLYCEMIA: ICD-10-CM

## 2018-06-11 DIAGNOSIS — S16.1XXA STRAIN OF NECK MUSCLE, INITIAL ENCOUNTER: ICD-10-CM
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
## 2018-06-11 DIAGNOSIS — S80.02XA CONTUSION OF LEFT KNEE, INITIAL ENCOUNTER: ICD-10-CM

## 2018-06-11 LAB
ALBUMIN SERPL-MCNC: 3.3 G/DL (ref 3.4–5)
ALBUMIN UR-MCNC: NEGATIVE MG/DL
ALP SERPL-CCNC: 89 U/L (ref 40–150)
ALT SERPL W P-5'-P-CCNC: 60 U/L (ref 0–70)
ANION GAP SERPL CALCULATED.3IONS-SCNC: 5 MMOL/L (ref 3–14)
APPEARANCE UR: CLEAR
AST SERPL W P-5'-P-CCNC: 27 U/L (ref 0–45)
BASOPHILS # BLD AUTO: 0.1 10E9/L (ref 0–0.2)
BASOPHILS NFR BLD AUTO: 0.8 %
BILIRUB SERPL-MCNC: 0.2 MG/DL (ref 0.2–1.3)
BILIRUB UR QL STRIP: NEGATIVE
BUN SERPL-MCNC: 21 MG/DL (ref 7–30)
CALCIUM SERPL-MCNC: 8.8 MG/DL (ref 8.5–10.1)
CHLORIDE SERPL-SCNC: 107 MMOL/L (ref 94–109)
CO2 SERPL-SCNC: 27 MMOL/L (ref 20–32)
COLOR UR AUTO: ABNORMAL
CREAT SERPL-MCNC: 1.08 MG/DL (ref 0.66–1.25)
DIFFERENTIAL METHOD BLD: NORMAL
EOSINOPHIL # BLD AUTO: 0.4 10E9/L (ref 0–0.7)
EOSINOPHIL NFR BLD AUTO: 6 %
ERYTHROCYTE [DISTWIDTH] IN BLOOD BY AUTOMATED COUNT: 14.2 % (ref 10–15)
GFR SERPL CREATININE-BSD FRML MDRD: 74 ML/MIN/1.7M2
GLUCOSE SERPL-MCNC: 488 MG/DL (ref 70–99)
GLUCOSE UR STRIP-MCNC: >1000 MG/DL
HCT VFR BLD AUTO: 45.9 % (ref 40–53)
HGB BLD-MCNC: 15.5 G/DL (ref 13.3–17.7)
HGB UR QL STRIP: NEGATIVE
IMM GRANULOCYTES # BLD: 0 10E9/L (ref 0–0.4)
IMM GRANULOCYTES NFR BLD: 0.5 %
KETONES UR STRIP-MCNC: 5 MG/DL
LEUKOCYTE ESTERASE UR QL STRIP: NEGATIVE
LYMPHOCYTES # BLD AUTO: 1.1 10E9/L (ref 0.8–5.3)
LYMPHOCYTES NFR BLD AUTO: 17.6 %
MCH RBC QN AUTO: 27.8 PG (ref 26.5–33)
MCHC RBC AUTO-ENTMCNC: 33.8 G/DL (ref 31.5–36.5)
MCV RBC AUTO: 82 FL (ref 78–100)
MONOCYTES # BLD AUTO: 0.3 10E9/L (ref 0–1.3)
MONOCYTES NFR BLD AUTO: 5.2 %
NEUTROPHILS # BLD AUTO: 4.4 10E9/L (ref 1.6–8.3)
NEUTROPHILS NFR BLD AUTO: 69.9 %
NITRATE UR QL: NEGATIVE
NRBC # BLD AUTO: 0 10*3/UL
NRBC BLD AUTO-RTO: 0 /100
PH UR STRIP: 6 PH (ref 5–7)
PLATELET # BLD AUTO: 242 10E9/L (ref 150–450)
POTASSIUM SERPL-SCNC: 4.2 MMOL/L (ref 3.4–5.3)
PROT SERPL-MCNC: 6.7 G/DL (ref 6.8–8.8)
RBC # BLD AUTO: 5.58 10E12/L (ref 4.4–5.9)
RBC #/AREA URNS AUTO: <1 /HPF (ref 0–2)
SODIUM SERPL-SCNC: 139 MMOL/L (ref 133–144)
SOURCE: ABNORMAL
SP GR UR STRIP: 1.02 (ref 1–1.03)
UROBILINOGEN UR STRIP-MCNC: NORMAL MG/DL (ref 0–2)
WBC # BLD AUTO: 6.2 10E9/L (ref 4–11)
WBC #/AREA URNS AUTO: <1 /HPF (ref 0–5)

## 2018-06-11 PROCEDURE — 99285 EMERGENCY DEPT VISIT HI MDM: CPT | Performed by: EMERGENCY MEDICINE

## 2018-06-11 PROCEDURE — 99284 EMERGENCY DEPT VISIT MOD MDM: CPT | Mod: 25

## 2018-06-11 PROCEDURE — 81001 URINALYSIS AUTO W/SCOPE: CPT | Performed by: NURSE PRACTITIONER

## 2018-06-11 PROCEDURE — 25000131 ZZH RX MED GY IP 250 OP 636 PS 637: Performed by: NURSE PRACTITIONER

## 2018-06-11 PROCEDURE — 85025 COMPLETE CBC W/AUTO DIFF WBC: CPT | Performed by: NURSE PRACTITIONER

## 2018-06-11 PROCEDURE — 25000128 H RX IP 250 OP 636: Performed by: NURSE PRACTITIONER

## 2018-06-11 PROCEDURE — 80053 COMPREHEN METABOLIC PANEL: CPT | Performed by: NURSE PRACTITIONER

## 2018-06-11 PROCEDURE — 96372 THER/PROPH/DIAG INJ SC/IM: CPT

## 2018-06-11 PROCEDURE — 96360 HYDRATION IV INFUSION INIT: CPT

## 2018-06-11 RX ADMIN — SODIUM CHLORIDE 1000 ML: 9 INJECTION, SOLUTION INTRAVENOUS at 12:32

## 2018-06-11 RX ADMIN — INSULIN ASPART 7 UNITS: 100 INJECTION, SOLUTION INTRAVENOUS; SUBCUTANEOUS at 13:27

## 2018-06-11 ASSESSMENT — ENCOUNTER SYMPTOMS
FATIGUE: 1
CONFUSION: 1
WEAKNESS: 1

## 2018-06-11 NOTE — ED AVS SNAPSHOT
Emergency Department    [address] [facility] [address]    Phone:  [phone]    Fax:  [phone]                                       Israel Brown   MRN: [id number]    Department:   Emergency Department   Date of Visit:  6/11/2018           Patient Information     Date Of Birth          1975        Your diagnoses for this visit were:     Hyperglycemia     Type 2 diabetes mellitus with complication, unspecified long term insulin use status (H)        You were seen by Leyda Miles APRN CNP.      Follow-up Information     Follow up with Edmund Olson In 3 days.    Specialty:  Family Practice    Contact information:    [facility]  [address] [id number] [phone]          Discharge Instructions         High Blood Sugar (Hyperglycemia)     When you have hyperglycemia, drink plenty of water or other sugar-free, caffeine-free liquids.   Too much glucose (sugar) in your blood is called hyperglycemia or high blood sugar. High blood sugar can lead to a dangerous condition called ketoacidosis. In severe cases, it can lead to dehydration and coma.  Possible causes of hyperglycemia    Inadequate treatment plan for diabetes     Being sick    Being under stress    Taking certain medicines, such as steroids    Eating too much food, especially carbohydrates    Being less active than usual    Not taking enough diabetes medicine  Symptoms of hyperglycemia  Hyperglycemia may not cause symptoms. If you do have symptoms, they may include:    Thirst    Frequent need to urinate    Feeling tired    Nausea and vomiting    Itchy, dry skin    Blurry vision    Fast breathing and breath that smells fruity     Weakness    Dizziness    Wounds or skin infections that don t heal    Unexplained weight loss if hyperglycemia lasts for more than a few days   What you should do  Make sure you do the following:    Check your blood sugar.    Drink plenty of  sugar-free, caffeine-free liquids such as water. Don t drink fruit juice.    Check your blood sugar again every 4 hours. If you take insulin or diabetes medicines, follow your sick-day plan for taking medicine. Call your healthcare provider if you are not able to eat.    Check your blood or urine for ketones as directed.    Call your healthcare provider if your blood sugar and ketones do not return to your target range.  Preventing high blood sugar  To help keep your blood sugar from getting too high:    Control stress.    When you're ill, follow your sick-day plan.     Follow your meal plan. Eat only the amount of food on your meal plan    Follow your exercise plan.    Take your insulin or diabetes medicines as directed by your healthcare team. Also test your blood sugar as directed. If the plan is not working for you, discuss it with your healthcare provider.  Other things to do    Carry a medical ID card, a compact USB drive, or wear a medical alert bracelet or necklace. It should say that you have diabetes. It should also say what to do in case you pass out or go into a coma.    Make sure family, friends, and coworkers know the signs of high blood sugar. Tell them what to do if your blood sugar gets very high and you can t help yourself.    Talk to your healthcare team about other things you can do to prevent high blood sugar.   Special note: Drink plenty of sugar-free and caffeine-free liquids when you feel symptoms of hyperglycemia. Call your healthcare provider if you keep having episodes of hyperglycemia.   Date Last Reviewed: 5/1/2016 2000-2017 [employer]. [address] [place] [address], [place] [id number]. All rights reserved. This information is not intended as a substitute for professional medical care. Always follow your healthcare professional's instructions.          24 Hour Appointment Hotline       To make an appointment at any [facility], call [phone] ([phone]). If you  don't have a family doctor or clinic, we will help you find one. [facility] clinics are conveniently located to serve the needs of you and your family.             Review of your medicines      Our records show that you are taking the medicines listed below. If these are incorrect, please call your family doctor or clinic.        Dose / Directions Last dose taken    acetaminophen 325 MG tablet   Commonly known as:  TYLENOL   Dose:  650 mg   Quantity:  100 tablet        Take 2 tablets (650 mg) by mouth every 6 hours as needed for mild pain   Refills:  0        albuterol 108 (90 Base) MCG/ACT Inhaler   Commonly known as:  PROAIR HFA/PROVENTIL HFA/VENTOLIN HFA   Dose:  2 puff        Inhale 2 puffs into the lungs every 6 hours   Refills:  0        fluticasone 50 MCG/ACT spray   Commonly known as:  FLONASE   Dose:  1 spray        Spray 1 spray in nostril daily   Refills:  0        ibuprofen 600 MG tablet   Commonly known as:  ADVIL/MOTRIN   Dose:  600 mg   Quantity:  30 tablet        Take 1 tablet (600 mg) by mouth every 6 hours as needed for moderate pain   Refills:  1        insulin glargine 100 UNIT/ML injection   Commonly known as:  LANTUS   Dose:  60 Units        Inject 60 Units Subcutaneous every morning   Refills:  0        LYRICA PO        Refills:  0        montelukast 10 MG tablet   Commonly known as:  SINGULAIR   Dose:  10 mg        Take 10 mg by mouth At Bedtime   Refills:  0        NovoLOG FLEXPEN 100 UNIT/ML injection   Generic drug:  insulin aspart        Inject Subcutaneous 3 times daily (with meals)   Refills:  0        omeprazole 20 MG CR capsule   Commonly known as:  priLOSEC   Dose:  20 mg        Take 20 mg by mouth daily   Refills:  0        simvastatin 40 MG tablet   Commonly known as:  ZOCOR   Dose:  40 mg        Take 40 mg by mouth At Bedtime   Refills:  0                Procedures and tests performed during your visit     CBC with platelets + differential    Comprehensive metabolic panel    UA with  Microscopic      Orders Needing Specimen Collection     None      Pending Results     No orders found from 6/9/2018 to 6/12/2018.            Pending Culture Results     No orders found from 6/9/2018 to 6/12/2018.            Pending Results Instructions     If you had any lab results that were not finalized at the time of your Discharge, you can call the ED Lab Result RN at [phone]. You will be contacted by this team for any positive Lab results or changes in treatment. The nurses are available 7 days a week from 10A to 6:30P.  You can leave a message 24 hours per day and they will return your call.        Test Results From Your Hospital Stay        6/11/2018 11:52 AM      Component Results     Component Value Ref Range & Units Status    WBC 6.2 4.0 - 11.0 10e9/L Final    RBC Count 5.58 4.4 - 5.9 10e12/L Final    Hemoglobin 15.5 13.3 - 17.7 g/dL Final    Hematocrit 45.9 40.0 - 53.0 % Final    MCV 82 78 - 100 fl Final    MCH 27.8 26.5 - 33.0 pg Final    MCHC 33.8 31.5 - 36.5 g/dL Final    RDW 14.2 10.0 - 15.0 % Final    Platelet Count 242 150 - 450 10e9/L Final    Diff Method Automated Method  Final    % Neutrophils 69.9 % Final    % Lymphocytes 17.6 % Final    % Monocytes 5.2 % Final    % Eosinophils 6.0 % Final    % Basophils 0.8 % Final    % Immature Granulocytes 0.5 % Final    Nucleated RBCs 0 0 /100 Final    Absolute Neutrophil 4.4 1.6 - 8.3 10e9/L Final    Absolute Lymphocytes 1.1 0.8 - 5.3 10e9/L Final    Absolute Monocytes 0.3 0.0 - 1.3 10e9/L Final    Absolute Eosinophils 0.4 0.0 - 0.7 10e9/L Final    Absolute Basophils 0.1 0.0 - 0.2 10e9/L Final    Abs Immature Granulocytes 0.0 0 - 0.4 10e9/L Final    Absolute Nucleated RBC 0.0  Final         6/11/2018 12:09 PM      Component Results     Component Value Ref Range & Units Status    Sodium 139 133 - 144 mmol/L Final    Potassium 4.2 3.4 - 5.3 mmol/L Final    Chloride 107 94 - 109 mmol/L Final    Carbon Dioxide 27 20 - 32 mmol/L Final    Anion Gap 5 3 - 14  mmol/L Final    Glucose 488 (H) 70 - 99 mg/dL Final    Urea Nitrogen 21 7 - 30 mg/dL Final    Creatinine 1.08 0.66 - 1.25 mg/dL Final    GFR Estimate 74 >60 mL/min/1.7m2 Final    Non  GFR Calc    GFR Estimate If Black 90 >60 mL/min/1.7m2 Final    African American GFR Calc    Calcium 8.8 8.5 - 10.1 mg/dL Final    Bilirubin Total 0.2 0.2 - 1.3 mg/dL Final    Albumin 3.3 (L) 3.4 - 5.0 g/dL Final    Protein Total 6.7 (L) 6.8 - 8.8 g/dL Final    Alkaline Phosphatase 89 40 - 150 U/L Final    ALT 60 0 - 70 U/L Final    AST 27 0 - 45 U/L Final         6/11/2018 11:56 AM      Component Results     Component Value Ref Range & Units Status    Color Urine Straw  Final    Appearance Urine Clear  Final    Glucose Urine >1000 (A) NEG^Negative mg/dL Final    Bilirubin Urine Negative NEG^Negative Final    Ketones Urine 5 (A) NEG^Negative mg/dL Final    Specific Gravity Urine 1.019 1.003 - 1.035 Final    Blood Urine Negative NEG^Negative Final    pH Urine 6.0 5.0 - 7.0 pH Final    Protein Albumin Urine Negative NEG^Negative mg/dL Final    Urobilinogen mg/dL Normal 0.0 - 2.0 mg/dL Final    Nitrite Urine Negative NEG^Negative Final    Leukocyte Esterase Urine Negative NEG^Negative Final    Source Midstream Urine  Final    WBC Urine <1 0 - 5 /HPF Final    RBC Urine <1 0 - 2 /HPF Final                Clinical Quality Measure: Blood Pressure Screening     Your blood pressure was checked while you were in the emergency department today. The last reading we obtained was  BP: 130/80 . Please read the guidelines below about what these numbers mean and what you should do about them.  If your systolic blood pressure (the top number) is less than 120 and your diastolic blood pressure (the bottom number) is less than 80, then your blood pressure is normal. There is nothing more that you need to do about it.  If your systolic blood pressure (the top number) is 120-139 or your diastolic blood pressure (the bottom number) is 80-89,  "your blood pressure may be higher than it should be. You should have your blood pressure rechecked within a year by a primary care provider.  If your systolic blood pressure (the top number) is 140 or greater or your diastolic blood pressure (the bottom number) is 90 or greater, you may have high blood pressure. High blood pressure is treatable, but if left untreated over time it can put you at risk for heart attack, stroke, or kidney failure. You should have your blood pressure rechecked by a primary care provider within the next 4 weeks.  If your provider in the emergency department today gave you specific instructions to follow-up with your doctor or provider even sooner than that, you should follow that instruction and not wait for up to 4 weeks for your follow-up visit.        Thank you for choosing [facility]       Thank you for choosing [facility] for your care. Our goal is always to provide you with excellent care. Hearing back from our patients is one way we can continue to improve our services. Please take a few minutes to complete the written survey that you may receive in the mail after you visit with us. Thank you!        [employer] Information     [employer] lets you send messages to your doctor, view your test results, renew your prescriptions, schedule appointments and more. To sign up, go to [email or web address] . Click on \"Log in\" on the left side of the screen, which will take you to the Welcome page. Then click on \"Sign up Now\" on the right side of the page.     You will be asked to enter the access code listed below, as well as some personal information. Please follow the directions to create your username and password.     Your access code is: [email or web address]  Expires: 2018  2:08 PM     Your access code will  in 90 days. If you need help or a new code, please call your [facility] clinic or [phone].        Care EveryWhere ID     This is your Care EveryWhere ID. This could be used by other " organizations to access your [facility] medical records  [id number]        Equal Access to Services     DAYANA GUEVARA : Severino Andersen, jennifer crisostomo, ray redmond. So [facility] [phone].    ATENCIÓN: Si habla español, tiene a wolff disposición servicios gratuitos de asistencia lingüística. Llame al [phone].    We comply with applicable federal civil rights laws and [place] laws. We do not discriminate on the basis of race, color, national origin, age, disability, sex, sexual orientation, or gender identity.            After Visit Summary       This is your record. Keep this with you and show to your community pharmacist(s) and doctor(s) at your next visit.

## 2018-06-11 NOTE — ED AVS SNAPSHOT
Emergency Department    [address] [place] [address]    Phone:  [phone]    Fax:  [phone]                                       Israel Brown   MRN: [id number]    Department:   Emergency Department   Date of Visit:  6/11/2018           After Visit Summary Signature Page     I have received my discharge instructions, and my questions have been answered. I have discussed any challenges I see with this plan with the nurse or doctor.    ..........................................................................................................................................  Patient/Patient Representative Signature      ..........................................................................................................................................  Patient Representative Print Name and Relationship to Patient    ..................................................               ................................................  Date                                            Time    ..........................................................................................................................................  Reviewed by Signature/Title    ...................................................              ..............................................  Date                                                            Time

## 2018-06-11 NOTE — DISCHARGE INSTRUCTIONS
High Blood Sugar (Hyperglycemia)     When you have hyperglycemia, drink plenty of water or other sugar-free, caffeine-free liquids.   Too much glucose (sugar) in your blood is called hyperglycemia or high blood sugar. High blood sugar can lead to a dangerous condition called ketoacidosis. In severe cases, it can lead to dehydration and coma.  Possible causes of hyperglycemia    Inadequate treatment plan for diabetes     Being sick    Being under stress    Taking certain medicines, such as steroids    Eating too much food, especially carbohydrates    Being less active than usual    Not taking enough diabetes medicine  Symptoms of hyperglycemia  Hyperglycemia may not cause symptoms. If you do have symptoms, they may include:    Thirst    Frequent need to urinate    Feeling tired    Nausea and vomiting    Itchy, dry skin    Blurry vision    Fast breathing and breath that smells fruity     Weakness    Dizziness    Wounds or skin infections that don t heal    Unexplained weight loss if hyperglycemia lasts for more than a few days   What you should do  Make sure you do the following:    Check your blood sugar.    Drink plenty of sugar-free, caffeine-free liquids such as water. Don t drink fruit juice.    Check your blood sugar again every 4 hours. If you take insulin or diabetes medicines, follow your sick-day plan for taking medicine. Call your healthcare provider if you are not able to eat.    Check your blood or urine for ketones as directed.    Call your healthcare provider if your blood sugar and ketones do not return to your target range.  Preventing high blood sugar  To help keep your blood sugar from getting too high:    Control stress.    When you're ill, follow your sick-day plan.     Follow your meal plan. Eat only the amount of food on your meal plan    Follow your exercise plan.    Take your insulin or diabetes medicines as directed by your healthcare team. Also test your blood sugar as directed. If the  plan is not working for you, discuss it with your healthcare provider.  Other things to do    Carry a medical ID card, a compact USB drive, or wear a medical alert bracelet or necklace. It should say that you have diabetes. It should also say what to do in case you pass out or go into a coma.    Make sure family, friends, and coworkers know the signs of high blood sugar. Tell them what to do if your blood sugar gets very high and you can t help yourself.    Talk to your healthcare team about other things you can do to prevent high blood sugar.   Special note: Drink plenty of sugar-free and caffeine-free liquids when you feel symptoms of hyperglycemia. Call your healthcare provider if you keep having episodes of hyperglycemia.   Date Last Reviewed: 5/1/2016 2000-2017 [employer]. [address] [place] [address], [place] [address]. All rights reserved. This information is not intended as a substitute for professional medical care. Always follow your healthcare professional's instructions.
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
## 2018-06-11 NOTE — ED AVS SNAPSHOT
[facility], [place], Emergency Department    [address] [place] [address]    Phone:  [phone]                                       Israel Brown   MRN: [id number]    Department:  [facility], Emergency Department   Date of Visit:  6/11/2018           After Visit Summary Signature Page     I have received my discharge instructions, and my questions have been answered. I have discussed any challenges I see with this plan with the nurse or doctor.    ..........................................................................................................................................  Patient/Patient Representative Signature      ..........................................................................................................................................  Patient Representative Print Name and Relationship to Patient    ..................................................               ................................................  Date                                            Time    ..........................................................................................................................................  Reviewed by Signature/Title    ...................................................              ..............................................  Date                                                            Time

## 2018-06-11 NOTE — ED NOTES
Bed: ED29  Expected date:   Expected time:   Means of arrival:   Comments:  yahir - 514 - hyperglycemic eta 1126

## 2018-06-11 NOTE — ED PROVIDER NOTES
"  History     Chief Complaint:  Hyperglycemia    HPI   Israel Brown is a 42 year old male with a history of diabetes and neuropathy who presents via EMS with hyperglycemia. The patient reports he was at work this morning and began feeling tired, generally weak, and mildly confused, prompting his boss to call EMS to transport him to the ED, as the patient did not feel safe to drive himself. He states he did eat breakfast today. He states he has been diabetic for over 15 years but has not been checking his blood sugar as much as he thinks he should. He notes he works odd hours and has an inconsistent schedule, so he sometimes is in a hurry and does not take his insulin or have time to check his sugar. He also notes he has not seen his primary doctor for a long time.     Allergies:  Cortisone  HMG-Coa-R inhibitors  Penicillins     Medications:    Albuterol  Flonase  Novolog  Lantus  Singulair  Prilosec  Lyrica  Zocor    Past Medical History:    Asthma  Diabetes  Neuropathy  Rheumatoid arthritis  KEKE  Chronic nasal congestion    Past Surgical History:    Realign patella    Family History:    History reviewed. No pertinent family history.      Social History:  Smoking status: Never smoker  Alcohol use: No   Marital Status:  Significant other [7]     Review of Systems   Constitutional: Positive for fatigue.   Neurological: Positive for weakness (generalized).   Psychiatric/Behavioral: Positive for confusion.   All other systems reviewed and are negative.    Physical Exam     Patient Vitals for the past 24 hrs:   BP Temp Temp src Heart Rate Resp SpO2 Height Weight   06/11/18 1218 130/80 - - - - 94 % - -   06/11/18 1144 134/78 98.5  F (36.9  C) Oral 88 16 96 % 1.651 m (5' 5\") 90.7 kg (200 lb)      Physical Exam  Physical Exam   Constitutional:  Laying comfortably on the bed. Non-toxic appearing. No diaphoresis.  Head: Head moves freely with normal range of motion.   ENT: Oropharynx is clear and moist.   Eyes: " Conjunctivae pink. EOMs intact.   Neck: Normal range of motion.   Cardiovascular: Regular rate and rhythm. Normal heart sounds. No concerning murmur. Intact distal pulses: radial pulses 2+ on the right, 2+ on the left.   Pulmonary/Chest: Breathing is normal. No respiratory distress. No decreased breath sounds. No wheezes. No rhonchi. No rales. Lungs clear throughout.   Abdominal: Soft. Non-tender. No rebound, no guarding.   Musculoskeletal: No peripheral edema. Distal capillary refill and sensation intact.   Neurological: Oriented to person, place, and time. No focal deficits.   Skin: Skin is warm and normal in color. No rash noted.      Emergency Department Course   Laboratory:  CBC: WNL (WBC 6.2, HGB 15.5, )   CMP: Glucose 488 (H), Albumin 3.3 (L), Protein total 6.7 (L), o/w WNL (Creatinine 1.08)  UA: Glucose >1000, Ketones 5, o/w negative    Interventions:  1232: NS 1L IV Bolus    1327: Novolog 7 units Subcutaneous    Emergency Department Course:  The patient arrived in the emergency department via EMS.    Past medical records, nursing notes, and vitals reviewed.  1225: I performed an exam of the patient and obtained history, as documented above.   IV inserted and blood drawn.    1354: I rechecked the patient. Explained findings to the patient.    Findings and plan explained to the patient. Patient discharged home with instructions regarding supportive care, medications, and reasons to return. The importance of close follow-up was reviewed.    Impression & Plan    Medical Decision Making:  Patient with poorly controlled DM on Insulin. Here today with lethargy.  via EMS. Patient is not somnolent here and is alert and awake. No confusion. Labs and exam with no concern for DKA. Glucose 488 and 7 units Novolog given in the ED. We discussed need for better diabetes management and he notes that he knows how to manage his diabetes but has an inconsistent and long work schedule that makes it difficult to  follow any routine. We discussed reasons to return here and need for close primary care follow up. He and his significant other are amenable to plan.     Diagnosis:    ICD-10-CM   1. Hyperglycemia R73.9   2. Type 2 diabetes mellitus with complication, unspecified long term insulin use status (H) E11.8     Disposition:  discharged to home    Zakia Espinoza  6/11/2018    EMERGENCY DEPARTMENT    I, Zakia Espinoza, am serving as a scribe at 12:25 PM on 6/11/2018 to document services personally performed by Leyda Miles APRN CNP based on my observations and the provider's statements to me.       Leyda Miles APRN CNP  06/11/18 2103

## 2018-06-12 ENCOUNTER — APPOINTMENT (OUTPATIENT)
Dept: GENERAL RADIOLOGY | Facility: CLINIC | Age: 43
End: 2018-06-12
Attending: EMERGENCY MEDICINE
Payer: COMMERCIAL

## 2018-06-12 ENCOUNTER — APPOINTMENT (OUTPATIENT)
Dept: CT IMAGING | Facility: CLINIC | Age: 43
End: 2018-06-12
Attending: EMERGENCY MEDICINE
Payer: COMMERCIAL

## 2018-06-12 VITALS
SYSTOLIC BLOOD PRESSURE: 142 MMHG | HEART RATE: 80 BPM | RESPIRATION RATE: 16 BRPM | TEMPERATURE: 98.2 F | WEIGHT: 200 LBS | DIASTOLIC BLOOD PRESSURE: 85 MMHG | OXYGEN SATURATION: 97 % | BODY MASS INDEX: 33.32 KG/M2 | HEIGHT: 65 IN

## 2018-06-12 LAB
ANION GAP SERPL CALCULATED.3IONS-SCNC: 8 MMOL/L (ref 3–14)
BASOPHILS # BLD AUTO: 0.1 10E9/L (ref 0–0.2)
BASOPHILS NFR BLD AUTO: 0.9 %
BUN SERPL-MCNC: 21 MG/DL (ref 7–30)
CALCIUM SERPL-MCNC: 8.3 MG/DL (ref 8.5–10.1)
CHLORIDE SERPL-SCNC: 106 MMOL/L (ref 94–109)
CO2 SERPL-SCNC: 24 MMOL/L (ref 20–32)
CREAT SERPL-MCNC: 0.92 MG/DL (ref 0.66–1.25)
DIFFERENTIAL METHOD BLD: NORMAL
EOSINOPHIL # BLD AUTO: 0.4 10E9/L (ref 0–0.7)
EOSINOPHIL NFR BLD AUTO: 5.5 %
ERYTHROCYTE [DISTWIDTH] IN BLOOD BY AUTOMATED COUNT: 14.4 % (ref 10–15)
GFR SERPL CREATININE-BSD FRML MDRD: 89 ML/MIN/1.7M2
GLUCOSE SERPL-MCNC: 339 MG/DL (ref 70–99)
HCT VFR BLD AUTO: 43.4 % (ref 40–53)
HGB BLD-MCNC: 14.6 G/DL (ref 13.3–17.7)
IMM GRANULOCYTES # BLD: 0 10E9/L (ref 0–0.4)
IMM GRANULOCYTES NFR BLD: 0.6 %
LYMPHOCYTES # BLD AUTO: 1.9 10E9/L (ref 0.8–5.3)
LYMPHOCYTES NFR BLD AUTO: 28.6 %
MCH RBC QN AUTO: 27.9 PG (ref 26.5–33)
MCHC RBC AUTO-ENTMCNC: 33.6 G/DL (ref 31.5–36.5)
MCV RBC AUTO: 83 FL (ref 78–100)
MONOCYTES # BLD AUTO: 0.4 10E9/L (ref 0–1.3)
MONOCYTES NFR BLD AUTO: 6.3 %
NEUTROPHILS # BLD AUTO: 3.8 10E9/L (ref 1.6–8.3)
NEUTROPHILS NFR BLD AUTO: 58.1 %
NRBC # BLD AUTO: 0 10*3/UL
NRBC BLD AUTO-RTO: 0 /100
PLATELET # BLD AUTO: 272 10E9/L (ref 150–450)
POTASSIUM SERPL-SCNC: 3.8 MMOL/L (ref 3.4–5.3)
RBC # BLD AUTO: 5.24 10E12/L (ref 4.4–5.9)
SODIUM SERPL-SCNC: 137 MMOL/L (ref 133–144)
WBC # BLD AUTO: 6.5 10E9/L (ref 4–11)

## 2018-06-12 PROCEDURE — 74177 CT ABD & PELVIS W/CONTRAST: CPT

## 2018-06-12 PROCEDURE — 73030 X-RAY EXAM OF SHOULDER: CPT | Mod: LT

## 2018-06-12 PROCEDURE — 25000128 H RX IP 250 OP 636: Performed by: STUDENT IN AN ORGANIZED HEALTH CARE EDUCATION/TRAINING PROGRAM

## 2018-06-12 PROCEDURE — 71046 X-RAY EXAM CHEST 2 VIEWS: CPT

## 2018-06-12 PROCEDURE — 96374 THER/PROPH/DIAG INJ IV PUSH: CPT | Performed by: EMERGENCY MEDICINE

## 2018-06-12 PROCEDURE — 85025 COMPLETE CBC W/AUTO DIFF WBC: CPT | Performed by: EMERGENCY MEDICINE

## 2018-06-12 PROCEDURE — 25000128 H RX IP 250 OP 636: Performed by: EMERGENCY MEDICINE

## 2018-06-12 PROCEDURE — 80048 BASIC METABOLIC PNL TOTAL CA: CPT | Performed by: EMERGENCY MEDICINE

## 2018-06-12 PROCEDURE — 73560 X-RAY EXAM OF KNEE 1 OR 2: CPT | Mod: LT

## 2018-06-12 PROCEDURE — 96361 HYDRATE IV INFUSION ADD-ON: CPT | Performed by: EMERGENCY MEDICINE

## 2018-06-12 RX ORDER — IOPAMIDOL 755 MG/ML
120 INJECTION, SOLUTION INTRAVASCULAR ONCE
Status: COMPLETED | OUTPATIENT
Start: 2018-06-12 | End: 2018-06-12

## 2018-06-12 RX ORDER — KETOROLAC TROMETHAMINE 30 MG/ML
30 INJECTION, SOLUTION INTRAMUSCULAR; INTRAVENOUS
Status: COMPLETED | OUTPATIENT
Start: 2018-06-12 | End: 2018-06-12

## 2018-06-12 RX ADMIN — SODIUM CHLORIDE 1000 ML: 9 INJECTION, SOLUTION INTRAVENOUS at 00:18

## 2018-06-12 RX ADMIN — KETOROLAC TROMETHAMINE 30 MG: 30 INJECTION, SOLUTION INTRAMUSCULAR at 01:37

## 2018-06-12 RX ADMIN — IOPAMIDOL 120 ML: 755 INJECTION, SOLUTION INTRAVENOUS at 00:56

## 2018-06-12 ASSESSMENT — ENCOUNTER SYMPTOMS
CHILLS: 0
ARTHRALGIAS: 1
COUGH: 0
SEIZURES: 0
SORE THROAT: 0
LIGHT-HEADEDNESS: 0
NUMBNESS: 0
RHINORRHEA: 0
SHORTNESS OF BREATH: 1
DIZZINESS: 0
ABDOMINAL PAIN: 0
HEADACHES: 1
WEAKNESS: 0
FEVER: 0
NECK PAIN: 1
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
## 2018-10-08 ENCOUNTER — APPOINTMENT (OUTPATIENT)
Dept: GENERAL RADIOLOGY | Facility: CLINIC | Age: 43
End: 2018-10-08
Attending: EMERGENCY MEDICINE

## 2018-10-08 ENCOUNTER — HOSPITAL ENCOUNTER (EMERGENCY)
Facility: CLINIC | Age: 43
Discharge: HOME | End: 2018-10-09
Attending: EMERGENCY MEDICINE | Admitting: EMERGENCY MEDICINE

## 2018-10-08 DIAGNOSIS — R07.89 CHEST WALL PAIN: ICD-10-CM

## 2018-10-08 LAB
ALBUMIN SERPL-MCNC: 3.3 G/DL (ref 3.4–5)
ALP SERPL-CCNC: 72 U/L (ref 40–150)
ALT SERPL W P-5'-P-CCNC: 69 U/L (ref 0–70)
ANION GAP SERPL CALCULATED.3IONS-SCNC: 8 MMOL/L (ref 3–14)
APTT PPP: 28 SEC (ref 22–37)
AST SERPL W P-5'-P-CCNC: 32 U/L (ref 0–45)
BASOPHILS # BLD AUTO: 0.1 10E9/L (ref 0–0.2)
BASOPHILS NFR BLD AUTO: 1 %
BILIRUB SERPL-MCNC: 0.3 MG/DL (ref 0.2–1.3)
BUN SERPL-MCNC: 16 MG/DL (ref 7–30)
CALCIUM SERPL-MCNC: 8.4 MG/DL (ref 8.5–10.1)
CHLORIDE SERPL-SCNC: 100 MMOL/L (ref 94–109)
CO2 SERPL-SCNC: 27 MMOL/L (ref 20–32)
CREAT SERPL-MCNC: 1.19 MG/DL (ref 0.66–1.25)
D DIMER PPP FEU-MCNC: 0.3 UG/ML FEU (ref 0–0.5)
DIFFERENTIAL METHOD BLD: NORMAL
EOSINOPHIL # BLD AUTO: 0.4 10E9/L (ref 0–0.7)
EOSINOPHIL NFR BLD AUTO: 6.1 %
ERYTHROCYTE [DISTWIDTH] IN BLOOD BY AUTOMATED COUNT: 14.6 % (ref 10–15)
GFR SERPL CREATININE-BSD FRML MDRD: 66 ML/MIN/1.7M2
GLUCOSE BLDC GLUCOMTR-MCNC: 351 MG/DL (ref 70–99)
GLUCOSE SERPL-MCNC: 470 MG/DL (ref 70–99)
HCT VFR BLD AUTO: 44.1 % (ref 40–53)
HGB BLD-MCNC: 15 G/DL (ref 13.3–17.7)
IMM GRANULOCYTES # BLD: 0 10E9/L (ref 0–0.4)
IMM GRANULOCYTES NFR BLD: 0.3 %
INR PPP: 0.89 (ref 0.86–1.14)
LIPASE SERPL-CCNC: 130 U/L (ref 73–393)
LYMPHOCYTES # BLD AUTO: 1.3 10E9/L (ref 0.8–5.3)
LYMPHOCYTES NFR BLD AUTO: 22.1 %
MCH RBC QN AUTO: 28.8 PG (ref 26.5–33)
MCHC RBC AUTO-ENTMCNC: 34 G/DL (ref 31.5–36.5)
MCV RBC AUTO: 85 FL (ref 78–100)
MONOCYTES # BLD AUTO: 0.4 10E9/L (ref 0–1.3)
MONOCYTES NFR BLD AUTO: 7.2 %
NEUTROPHILS # BLD AUTO: 3.7 10E9/L (ref 1.6–8.3)
NEUTROPHILS NFR BLD AUTO: 63.3 %
NRBC # BLD AUTO: 0 10*3/UL
NRBC BLD AUTO-RTO: 0 /100
PLATELET # BLD AUTO: 226 10E9/L (ref 150–450)
POTASSIUM SERPL-SCNC: 4.2 MMOL/L (ref 3.4–5.3)
PROT SERPL-MCNC: 6 G/DL (ref 6.8–8.8)
RBC # BLD AUTO: 5.2 10E12/L (ref 4.4–5.9)
SODIUM SERPL-SCNC: 134 MMOL/L (ref 133–144)
TROPONIN I SERPL-MCNC: <0.015 UG/L (ref 0–0.04)
WBC # BLD AUTO: 5.9 10E9/L (ref 4–11)

## 2018-10-08 PROCEDURE — 76775 US EXAM ABDO BACK WALL LIM: CPT | Mod: 59 | Performed by: EMERGENCY MEDICINE

## 2018-10-08 PROCEDURE — 99285 EMERGENCY DEPT VISIT HI MDM: CPT | Mod: 25 | Performed by: EMERGENCY MEDICINE

## 2018-10-08 PROCEDURE — 85730 THROMBOPLASTIN TIME PARTIAL: CPT | Performed by: EMERGENCY MEDICINE

## 2018-10-08 PROCEDURE — 93308 TTE F-UP OR LMTD: CPT | Performed by: EMERGENCY MEDICINE

## 2018-10-08 PROCEDURE — 85379 FIBRIN DEGRADATION QUANT: CPT | Performed by: EMERGENCY MEDICINE

## 2018-10-08 PROCEDURE — 96360 HYDRATION IV INFUSION INIT: CPT

## 2018-10-08 PROCEDURE — 96372 THER/PROPH/DIAG INJ SC/IM: CPT | Performed by: EMERGENCY MEDICINE

## 2018-10-08 PROCEDURE — 80053 COMPREHEN METABOLIC PANEL: CPT | Performed by: EMERGENCY MEDICINE

## 2018-10-08 PROCEDURE — 96361 HYDRATE IV INFUSION ADD-ON: CPT | Performed by: EMERGENCY MEDICINE

## 2018-10-08 PROCEDURE — 84484 ASSAY OF TROPONIN QUANT: CPT | Performed by: EMERGENCY MEDICINE

## 2018-10-08 PROCEDURE — 93308 TTE F-UP OR LMTD: CPT | Mod: 26 | Performed by: EMERGENCY MEDICINE

## 2018-10-08 PROCEDURE — 93010 ELECTROCARDIOGRAM REPORT: CPT | Mod: 59 | Performed by: EMERGENCY MEDICINE

## 2018-10-08 PROCEDURE — 25000128 H RX IP 250 OP 636: Performed by: EMERGENCY MEDICINE

## 2018-10-08 PROCEDURE — 85025 COMPLETE CBC W/AUTO DIFF WBC: CPT | Performed by: EMERGENCY MEDICINE

## 2018-10-08 PROCEDURE — 76775 US EXAM ABDO BACK WALL LIM: CPT | Mod: 26 | Performed by: EMERGENCY MEDICINE

## 2018-10-08 PROCEDURE — 93005 ELECTROCARDIOGRAM TRACING: CPT | Mod: 59 | Performed by: EMERGENCY MEDICINE

## 2018-10-08 PROCEDURE — 71046 X-RAY EXAM CHEST 2 VIEWS: CPT

## 2018-10-08 PROCEDURE — 00000146 ZZHCL STATISTIC GLUCOSE BY METER IP

## 2018-10-08 PROCEDURE — 85610 PROTHROMBIN TIME: CPT | Performed by: EMERGENCY MEDICINE

## 2018-10-08 PROCEDURE — 25000132 ZZH RX MED GY IP 250 OP 250 PS 637: Performed by: EMERGENCY MEDICINE

## 2018-10-08 PROCEDURE — 83690 ASSAY OF LIPASE: CPT | Performed by: EMERGENCY MEDICINE

## 2018-10-08 RX ORDER — ASPIRIN 81 MG/1
324 TABLET, CHEWABLE ORAL ONCE
Status: COMPLETED | OUTPATIENT
Start: 2018-10-08 | End: 2018-10-08

## 2018-10-08 RX ORDER — ACETAMINOPHEN 325 MG/1
650 TABLET ORAL ONCE
Status: DISCONTINUED | OUTPATIENT
Start: 2018-10-08 | End: 2018-10-08

## 2018-10-08 RX ORDER — ACETAMINOPHEN 325 MG/1
650 TABLET ORAL EVERY 4 HOURS PRN
Status: DISCONTINUED | OUTPATIENT
Start: 2018-10-08 | End: 2018-10-09 | Stop reason: HOSPADM

## 2018-10-08 RX ORDER — LIDOCAINE 4 G/G
1 PATCH TOPICAL ONCE
Status: DISCONTINUED | OUTPATIENT
Start: 2018-10-09 | End: 2018-10-09 | Stop reason: HOSPADM

## 2018-10-08 RX ADMIN — SODIUM CHLORIDE, POTASSIUM CHLORIDE, SODIUM LACTATE AND CALCIUM CHLORIDE 1000 ML: 600; 310; 30; 20 INJECTION, SOLUTION INTRAVENOUS at 21:16

## 2018-10-08 RX ADMIN — ACETAMINOPHEN 650 MG: 325 TABLET, FILM COATED ORAL at 21:16

## 2018-10-08 RX ADMIN — ASPIRIN 81 MG CHEWABLE TABLET 324 MG: 81 TABLET CHEWABLE at 20:11

## 2018-10-08 ASSESSMENT — ENCOUNTER SYMPTOMS
NAUSEA: 1
VOMITING: 0
DIAPHORESIS: 0
SHORTNESS OF BREATH: 1

## 2018-10-08 NOTE — ED AVS SNAPSHOT
[facility], [place], Emergency Department    [address] [place] [id number]    Phone:  [phone]                                       Israel Brown   MRN: [id number]    Department:  [facility], Emergency Department   Date of Visit:  10/8/2018           After Visit Summary Signature Page     I have received my discharge instructions, and my questions have been answered. I have discussed any challenges I see with this plan with the nurse or doctor.    ..........................................................................................................................................  Patient/Patient Representative Signature      ..........................................................................................................................................  Patient Representative Print Name and Relationship to Patient    ..................................................               ................................................  Date                                   Time    ..........................................................................................................................................  Reviewed by Signature/Title    ...................................................              ..............................................  Date                                               Time          22EPIC Rev 08/18

## 2018-10-08 NOTE — ED TRIAGE NOTES
Patient with right sided chest pain that started about an hour ago. Radiates to top of his head and upper right arm. No cardiac history.

## 2018-10-09 VITALS
RESPIRATION RATE: 11 BRPM | HEART RATE: 75 BPM | WEIGHT: 200 LBS | BODY MASS INDEX: 33.28 KG/M2 | SYSTOLIC BLOOD PRESSURE: 136 MMHG | DIASTOLIC BLOOD PRESSURE: 77 MMHG | OXYGEN SATURATION: 92 % | TEMPERATURE: 97.9 F

## 2018-10-09 LAB
GLUCOSE SERPL-MCNC: 263 MG/DL (ref 70–99)
INTERPRETATION ECG - MUSE: NORMAL
TROPONIN I SERPL-MCNC: <0.015 UG/L (ref 0–0.04)

## 2018-10-09 PROCEDURE — 25000132 ZZH RX MED GY IP 250 OP 250 PS 637: Performed by: EMERGENCY MEDICINE

## 2018-10-09 PROCEDURE — 84484 ASSAY OF TROPONIN QUANT: CPT | Performed by: EMERGENCY MEDICINE

## 2018-10-09 PROCEDURE — 82947 ASSAY GLUCOSE BLOOD QUANT: CPT | Performed by: EMERGENCY MEDICINE

## 2018-10-09 PROCEDURE — 25000131 ZZH RX MED GY IP 250 OP 636 PS 637: Performed by: EMERGENCY MEDICINE

## 2018-10-09 RX ORDER — ACETAMINOPHEN 325 MG/1
650 TABLET ORAL EVERY 6 HOURS PRN
Qty: 100 TABLET | Refills: 0 | Status: SHIPPED | OUTPATIENT
Start: 2018-10-09 | End: 2018-11-29

## 2018-10-09 RX ADMIN — LIDOCAINE 1 PATCH: 560 PATCH PERCUTANEOUS; TOPICAL; TRANSDERMAL at 01:39

## 2018-10-09 RX ADMIN — INSULIN GLARGINE 60 UNITS: 100 INJECTION, SOLUTION SUBCUTANEOUS at 01:38

## 2018-10-09 NOTE — DISCHARGE INSTRUCTIONS
See your primary doctor in 1-2 days  Remove the Lidoderm patch within 12 hours; do not apply heat over the patch and wash hands after handling  Take Tylenol as prescribed  Inspect your skin for outbreak of blister-like lesions or red spots  Return to the emergency department for fever, vomiting, worsening pain, rash all over her body, or if you have any new or changing symptoms or concerns    *CHEST PAIN, UNCERTAIN CAUSE    Based on your exam today, the exact cause of your chest pain is not certain. Your condition does not seem serious at this time, and your pain does not appear to be coming from your heart. However, sometimes the signs of a serious problem take more time to appear. Therefore, watch for the warning signs listed below.  HOME CARE:    1. Rest today and avoid strenuous activity.  2. Take any prescribed medicine as directed.  FOLLOW UP with your doctor in 1-3 days.   GET PROMPT MEDICAL ATTENTION if any of the following occur:    A change in the type of pain: if it feels different, becomes more severe, lasts longer, or begins to spread into your shoulder, arm, neck, jaw or back    Shortness of breath or increased pain with breathing    Weakness, dizziness, or fainting    Cough with blood or dark colored sputum (phlegm)    Fever over 101  F (38.3  C)    Swelling, pain or redness in one leg    4891-9211 [employer]. [address] [place] [address], [place] [id number]. All rights reserved. This information is not intended as a substitute for professional medical care. Always follow your healthcare professional's instructions.  This information has been modified by your health care provider with permission from the publisher.

## 2018-10-09 NOTE — ED PROVIDER NOTES
[place] EMERGENCY DEPARTMENT ([facility])  10/08/18   History     Chief Complaint   Patient presents with     Chest Pain     The history is provided by the patient, a significant other and medical records.     Israel Brown is a 42 year old male with a medical history significant for type 2 diabetes mellitus, asthma and KEKE who presents to the Emergency Department for evaluation of right-sided chest pain.  Patient's pain started suddenly approximately 1 hour prior to arrival (approximately 5:30 PM) and he describes it as a stabbing type pain.  Patient localizes it to his right lower chest; he reports that he has had chest pain in the past, but he has never had pain in this area.  Patient's pain has been constant since it started and is worse with movement.  He denies anything improving the pain.  Patient did not take any medications prior to arrival.  He notes that the pain is associated with some mild shortness of breath and some mild nausea, but he denies any diaphoresis or vomiting.  He denies any leg swelling.  He denies any history of DVT/PE and he denies any recent travel.  He denies any recent trauma or injury to his chest.  He denies any history of past abdominal surgeries or chest cavity surgeries.  Patient is a non-smoker.    Per review of patient's chart, patient did undergo a [place] Lexiscan Stress Test on 1/23/2018; this stress test was normal and the patient had an EF of 73%.    I have reviewed the Medications, Allergies, Past Medical and Surgical History, and Social History in the [employer] system.    Past Medical History:   Diagnosis Date     Asthma      Diabetes mellitus (H)      Neuropathy      RA (rheumatoid arthritis) (H)        Past Surgical History:   Procedure Laterality Date     REALIGN PATELLA         No family history on file.    Social History   Substance Use Topics     Smoking status: Never Smoker     Smokeless tobacco: Never Used     Alcohol use No       No current  facility-administered medications for this encounter.      Current Outpatient Prescriptions   Medication     acetaminophen (TYLENOL) 325 MG tablet     albuterol (PROAIR HFA, PROVENTIL HFA, VENTOLIN HFA) 108 (90 BASE) MCG/ACT inhaler     fluticasone (FLONASE) 50 MCG/ACT spray     ibuprofen (ADVIL/MOTRIN) 600 MG tablet     insulin aspart (NOVOLOG FLEXPEN) 100 UNIT/ML injection     insulin glargine (LANTUS) 100 UNIT/ML injection     montelukast (SINGULAIR) 10 MG tablet     omeprazole (PRILOSEC) 20 MG CR capsule     Pregabalin (LYRICA PO)     simvastatin (ZOCOR) 40 MG tablet        Allergies   Allergen Reactions     Cortisone      Hmg-Coa-R Inhibitors Other (See Comments)     Side effect: myalgias. Ok with zocor only     Penicillins          Review of Systems   Constitutional: Negative for diaphoresis.   HENT: Negative for congestion.    Eyes: Negative for visual disturbance.   Respiratory: Positive for shortness of breath (mild). Negative for chest tightness.         Mild, unsure but reports possible shortness of breath   Cardiovascular: Positive for chest pain (right, lower; stabbing type). Negative for leg swelling.   Gastrointestinal: Positive for nausea (mild). Negative for vomiting.   Musculoskeletal: Negative for back pain.   Skin: Negative for rash.   Neurological: Negative for headaches.       Physical Exam   BP: (!) 159/94  Pulse: 75  Temp: 97.9  F (36.6  C)  Resp: 18  Weight: 90.7 kg (200 lb)  SpO2: 98 %      Physical Exam   Constitutional: He is oriented to person, place, and time. He appears well-developed and well-nourished. No distress.   HENT:   Head: Normocephalic and atraumatic.   Mouth/Throat: Oropharynx is clear and moist.   Eyes: Conjunctivae are normal. Pupils are equal, round, and reactive to light.   Neck: Normal range of motion. Neck supple.   No carotid bruits   Cardiovascular: Normal rate, regular rhythm, normal heart sounds and intact distal pulses.    initially tachycardic, resolved, radial  and DP pulses equal   Pulmonary/Chest: Effort normal and breath sounds normal. No respiratory distress. He exhibits tenderness.   R lower chest wall very sensitive to light tough   Abdominal: Soft. Bowel sounds are normal. There is no tenderness.   Musculoskeletal: Normal range of motion. He exhibits no edema or tenderness.   Neurological: He is alert and oriented to person, place, and time.   Skin: Skin is warm and dry. No rash noted.       ED Course   7:09 PM  The patient was seen and examined by Zhanna Sanders MD in Room ED18.     ED Course     Procedures             EKG Interpretation:      Interpreted by Zhanna Sanders    Symptoms at time of EKG: pain   Rhythm: normal sinus   Rate: Tachycardia  Axis: Normal  Ectopy: none  Conduction: normal  ST Segments/ T Waves: No ST-T wave changes  Q Waves: none  Comparison to prior: No old EKG available    Clinical Impression: sinus tachycardia            Critical Care time:  none             Labs Ordered and Resulted from Time of ED Arrival Up to the Time of Departure from the ED - No data to display         Assessments & Plan (with Medical Decision Making)   Patient with history of poorly controlled diabetes (known to PMD, has difficulty complying with medications and does not like taking his pre-meal insulin), asthma, presents with 1 hour of R lower chest pain.  Exam was notable for tachycardia which resolved. DDX includes life threats such as ACS, unstable angina, PE, ruptured AAA, dissection.  EKG did not show acute ischemia, and patient had negative stress test less than one year ago.  Will obtain 2 troponin measurements 6 hours apart to look for NSTEMI.  Patient is low risk for PE but PERC rule does not apply as he was transiently tachycardic.  Will obtain d-dimer.  Cardiac ultrasound does not show effusion, bowing of septum, or obvious valve abnormality, and has generally good EF.  Attempted to evaluate aorta with bedside ultrasound, however view was obstructed  by bowel gas.  Lower aorta was not dilated and there was no free fluid in the RUQ, and Mr Kevin also lacks associated risk factors with AAA such as smoking, age >50, and is not hemodynamically unstable.  He does not have evidence on physical exam of aortic dissection such as pulse or blood pressure differential, and his pain is reproducible on palpation so this is lower on my differential.  Will obtain CXR, bloodwork, and reassess.    Progress note: Second troponin is negative, as well as d-dimer, CXR without acute abnormality.  Given recent stress test, this pain seems unlikely to be due to ACS.  He was noted to be hyperglycemic as he has missed his diabetes medication this evening, improved with hydration, long acting insulin administered. Patients pain has improved somewhat, though he is still sensitive to even light brushing of skin in lower R thoracic region.  No clear cause of pain, however skin hyperesthesia in unilateral T10 distribution may herald developing shingles rash.  There is no evidence of rash at this time.  Explained this possibility to patient, as well as need to follow up with primary doctor and return for persistent or worsening symptoms.  He expressed understanding.  Zhanna Sanders MD on 10/9/2018 at 12:03 AM       I have reviewed the nursing notes.    I have reviewed the findings, diagnosis, plan and need for follow up with the patient.    New Prescriptions    No medications on file       Final diagnoses:   None     Delta FOX, am serving as a trained medical scribe to document services personally performed by Zhanna Sanders MD, based on the provider's statements to me.   Zhanna FOX MD, was physically present and have reviewed and verified the accuracy of this note documented by Delta Luciano.    10/8/2018   [facility], [place], EMERGENCY DEPARTMENT     Zhanna Sanders MD  10/15/18 0004

## 2018-10-15 ASSESSMENT — ENCOUNTER SYMPTOMS
CHEST TIGHTNESS: 0
HEADACHES: 0
BACK PAIN: 0

## 2018-11-29 ENCOUNTER — APPOINTMENT (OUTPATIENT)
Dept: GENERAL RADIOLOGY | Facility: CLINIC | Age: 43
End: 2018-11-29
Attending: EMERGENCY MEDICINE

## 2018-11-29 ENCOUNTER — HOSPITAL ENCOUNTER (EMERGENCY)
Facility: CLINIC | Age: 43
Discharge: HOME | End: 2018-11-30
Attending: EMERGENCY MEDICINE | Admitting: EMERGENCY MEDICINE

## 2018-11-29 DIAGNOSIS — M25.552 HIP PAIN, ACUTE, LEFT: ICD-10-CM

## 2018-11-29 DIAGNOSIS — M25.512 LEFT SHOULDER PAIN, UNSPECIFIED CHRONICITY: ICD-10-CM

## 2018-11-29 PROCEDURE — 99284 EMERGENCY DEPT VISIT MOD MDM: CPT | Mod: Z6 | Performed by: EMERGENCY MEDICINE

## 2018-11-29 PROCEDURE — 73030 X-RAY EXAM OF SHOULDER: CPT | Mod: LT

## 2018-11-29 PROCEDURE — 72040 X-RAY EXAM NECK SPINE 2-3 VW: CPT

## 2018-11-29 PROCEDURE — 99285 EMERGENCY DEPT VISIT HI MDM: CPT | Mod: 25 | Performed by: EMERGENCY MEDICINE

## 2018-11-29 PROCEDURE — 72050 X-RAY EXAM NECK SPINE 4/5VWS: CPT

## 2018-11-29 PROCEDURE — 73502 X-RAY EXAM HIP UNI 2-3 VIEWS: CPT

## 2018-11-29 ASSESSMENT — ENCOUNTER SYMPTOMS
DIARRHEA: 0
VOMITING: 0
NECK PAIN: 1
NUMBNESS: 0
FEVER: 0

## 2018-11-29 NOTE — ED AVS SNAPSHOT
[facility], Emergency Department    [address] [address]    Phone:  [phone]                                       Israel Brown   MRN: [id number]    Department:  [facility], Emergency Department   Date of Visit:  11/29/2018           Patient Information     Date Of Birth          1975        Your diagnoses for this visit were:     Left shoulder pain, unspecified chronicity     Hip pain, acute, left        You were seen by Shilpa Linder MD.        Discharge Instructions         Arthralgia    Arthralgia is the term for pain in or around the joint. It is a symptom, not a disease. This pain may involve one or more joints. In some cases, the pain moves from joint to joint.  There are many causes for joint pain. These include:    Injury    Osteoarthritis (wearing out of the joint surface)    Gout (inflammation of the joint due to crystals in the joint fluid)    Infection inside the joint      Bursitis (inflammation of the fluid-filled sacs around the joint)    Autoimmune disorders such as rheumatoid arthritis or lupus    Tendonitis (inflammation of chords that attach muscle to bone)  Home care    Rest the involved joint(s) until your symptoms improve.     You may be prescribed pain medicine. If none is prescribed, you may use acetaminophen or ibuprofen to control pain and inflammation.  Follow-up care  Follow up with your healthcare provider or as advised.  When to seek medical advice  Contact your healthcare provider right away if any of the following occurs:    Pain, swelling, or redness of joint increases    Pain worsens or recurs after a period of improvement    Pain moves to other joints    You cannot bear weight on the affected joint     You cannot move the affected joint    Joint appears deformed    New rash appears    Fever of 100.4 F (38 C) or higher, or as directed by your healthcare provider  Date Last Reviewed: 3/1/2017    [id number] [employer],  [employer]. [address] [place] [address]. All rights reserved. This information is not intended as a substitute for professional medical care. Always follow your healthcare professional's instructions.      Please make an appointment to follow up with Your Primary Care Provider as soon as possible for further testing and treatment.      24 Hour Appointment Hotline       To make an appointment at any [facility], call [phone] ([phone]). If you don't have a family doctor or clinic, we will help you find one. [place] clinics are conveniently located to serve the needs of you and your family.             Review of your medicines      Our records show that you are taking the medicines listed below. If these are incorrect, please call your family doctor or clinic.        Dose / Directions Last dose taken    albuterol 108 (90 Base) MCG/ACT inhaler   Commonly known as:  PROAIR HFA/PROVENTIL HFA/VENTOLIN HFA   Dose:  2 puff        Inhale 2 puffs into the lungs every 6 hours   Refills:  0        fluticasone 50 MCG/ACT nasal spray   Commonly known as:  FLONASE   Dose:  1 spray        Spray 1 spray in nostril daily   Refills:  0        insulin glargine 100 UNIT/ML pen   Commonly known as:  LANTUS PEN   Dose:  60 Units        Inject 60 Units Subcutaneous every morning   Refills:  0        LYRICA PO        Refills:  0        montelukast 10 MG tablet   Commonly known as:  SINGULAIR   Dose:  10 mg        Take 10 mg by mouth At Bedtime   Refills:  0        NovoLOG FLEXPEN 100 UNIT/ML pen   Generic drug:  insulin aspart        Inject Subcutaneous 3 times daily (with meals)   Refills:  0        omeprazole 20 MG DR capsule   Commonly known as:  priLOSEC   Dose:  20 mg        Take 20 mg by mouth daily   Refills:  0        simvastatin 40 MG tablet   Commonly known as:  ZOCOR   Dose:  40 mg        Take 40 mg by mouth At Bedtime   Refills:  0                Procedures and tests performed during your visit       "Hip Left w/o Contrast    XR Cervical Spine G/E 4 Views    XR Hip Left 2-3 Views    XR Shoulder Left G/E 3 Views      Orders Needing Specimen Collection     None      Pending Results     Date and Time Order Name Status Description    2018 2349 MR Hip Left w/o Contrast Preliminary     2018 2147 XR Cervical Spine G/E 4 Views Preliminary     2018 2147 XR Hip Left 2-3 Views Preliminary     2018 2147 XR Shoulder Left G/E 3 Views Preliminary             Pending Culture Results     No orders found for last 3 day(s).            Pending Results Instructions     If you had any lab results that were not finalized at the time of your Discharge, you can call the ED Lab Result RN at [phone]. You will be contacted by this team for any positive Lab results or changes in treatment. The nurses are available 7 days a week from 10A to 6:30P.  You can leave a message 24 hours per day and they will return your call.        Thank you for choosing [facility]       Thank you for choosing [facility] for your care. Our goal is always to provide you with excellent care. Hearing back from our patients is one way we can continue to improve our services. Please take a few minutes to complete the written survey that you may receive in the mail after you visit with us. Thank you!        [employer] Information     [employer] lets you send messages to your doctor, view your test results, renew your prescriptions, schedule appointments and more. To sign up, go to [email or web address] . Click on \"Log in\" on the left side of the screen, which will take you to the Welcome page. Then click on \"Sign up Now\" on the right side of the page.     You will be asked to enter the access code listed below, as well as some personal information. Please follow the directions to create your username and password.     Your access code is: [email or web address]  Expires: 2019 12:43 AM     Your access code will  in 90 days. If you need help or a new " code, please call your [place] clinic or [phone].        Care EveryWhere ID     This is your Care EveryWhere ID. This could be used by other organizations to access your [place] medical records  [id number]        Equal Access to Services     DAYANA GUEVARA : Severino Andersen, waaxda luqadaha, qaybta kaalmada rudi, ray chapin. So [facility] [phone].    ATENCIÓN: Si habla español, tiene a wolff disposición servicios gratuitos de asistencia lingüística. Llame al [phone].    We comply with applicable federal civil rights laws and [place] laws. We do not discriminate on the basis of race, color, national origin, age, disability, sex, sexual orientation, or gender identity.            After Visit Summary       This is your record. Keep this with you and show to your community pharmacist(s) and doctor(s) at your next visit.

## 2018-11-29 NOTE — ED AVS SNAPSHOT
[facility], [place], Emergency Department    [address] [place] [address]    Phone:  [phone]                                       Israel Brown   MRN: [id number]    Department:  [facility], Emergency Department   Date of Visit:  11/29/2018           After Visit Summary Signature Page     I have received my discharge instructions, and my questions have been answered. I have discussed any challenges I see with this plan with the nurse or doctor.    ..........................................................................................................................................  Patient/Patient Representative Signature      ..........................................................................................................................................  Patient Representative Print Name and Relationship to Patient    ..................................................               ................................................  Date                                   Time    ..........................................................................................................................................  Reviewed by Signature/Title    ...................................................              ..............................................  Date                                               Time          22EPIC Rev 08/18

## 2018-11-30 ENCOUNTER — APPOINTMENT (OUTPATIENT)
Dept: MRI IMAGING | Facility: CLINIC | Age: 43
End: 2018-11-30
Attending: EMERGENCY MEDICINE

## 2018-11-30 VITALS
HEART RATE: 94 BPM | OXYGEN SATURATION: 94 % | SYSTOLIC BLOOD PRESSURE: 122 MMHG | RESPIRATION RATE: 16 BRPM | WEIGHT: 186 LBS | TEMPERATURE: 98.3 F | HEIGHT: 65 IN | BODY MASS INDEX: 30.99 KG/M2 | DIASTOLIC BLOOD PRESSURE: 80 MMHG

## 2018-11-30 PROCEDURE — 73721 MRI JNT OF LWR EXTRE W/O DYE: CPT | Mod: LT

## 2018-11-30 NOTE — ED TRIAGE NOTES
Left shoulder pain 6 weeks duration, radiates to neck  Also left hip pain for 3 days, denies injury  Has not taken anything for pain

## 2018-11-30 NOTE — ED PROVIDER NOTES
Emergency Department Patient Sign-out       Brief HPI and ED course:  Patient is a 43 year old male signed out to me by Dr. Linder.  See initial ED Provider note for details of the presentation. In brief, patient presenting with L shoulder pain for 6 weeks, negative XR. Also with some L hip/buttock pain for 2-3 days and pain with weightbearing. MRI of left hip pending.    Vitals:   No data found.      Received Sign-out Plan:    Pending studies include: left hip MRI      Plan: if MRI negative then discharge.     Events after assuming care:  After care was assumed, a focused history and physical was performed. Agree with findings relayed by previous provider.     Hip MRI negative. Exam seemed consistent with trochanteric bursitis, as the greatest area of tenderness was over the greater trochanter. Offered bupivacaine +/- Kenalog injection. Patient declined.     After counseling on the diagnosis, work-up, and treatment plan, the patient was discharged to home. The patient was advised to follow-up with his PCP in a few days. The patient was advised to return to the ED if worsening symptoms, or if there are any urgent/life-threatening concerns.       Vishal Dawkins MD  Emergency Medicine        Vishal Dawkins MD  12/01/18 8926

## 2018-11-30 NOTE — DISCHARGE INSTRUCTIONS
Arthralgia    Arthralgia is the term for pain in or around the joint. It is a symptom, not a disease. This pain may involve one or more joints. In some cases, the pain moves from joint to joint.  There are many causes for joint pain. These include:    Injury    Osteoarthritis (wearing out of the joint surface)    Gout (inflammation of the joint due to crystals in the joint fluid)    Infection inside the joint      Bursitis (inflammation of the fluid-filled sacs around the joint)    Autoimmune disorders such as rheumatoid arthritis or lupus    Tendonitis (inflammation of chords that attach muscle to bone)  Home care    Rest the involved joint(s) until your symptoms improve.     You may be prescribed pain medicine. If none is prescribed, you may use acetaminophen or ibuprofen to control pain and inflammation.  Follow-up care  Follow up with your healthcare provider or as advised.  When to seek medical advice  Contact your healthcare provider right away if any of the following occurs:    Pain, swelling, or redness of joint increases    Pain worsens or recurs after a period of improvement    Pain moves to other joints    You cannot bear weight on the affected joint     You cannot move the affected joint    Joint appears deformed    New rash appears    Fever of 100.4 F (38 C) or higher, or as directed by your healthcare provider  Date Last Reviewed: 3/1/2017    1242-8777 [employer]. [address] [place] [address], [place] [address]. All rights reserved. This information is not intended as a substitute for professional medical care. Always follow your healthcare professional's instructions.      Please make an appointment to follow up with Your Primary Care Provider as soon as possible for further testing and treatment.

## 2018-11-30 NOTE — ED PROVIDER NOTES
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 Patient reports onset of left shoulder pain about 6 weeks ago. He denies any specific trauma or overuse of the shoulder but notes he fractured it \"a few years back\". He did not undergo surgery at that time but feels his currently pain is similar to the pain he experienced then. Patient states the pain radiates from his left neck down to his left elbow. Notably, he is left-handed. He states his pain is at times constant, at times intermittent and worsens when turning his head either direction or changing positions while lying down. In addition to his shoulder, patient complains of left groin pain that radiates to his left hip and buttock for the last 3 days. Again, he denies trauma to the area. Patient also denies recent chest pain, fevers, vomiting, or diarrhea. Denies changes in baseline numbness in extremities.  Denies any weakness in his extremities. Patient has not been taking Tylenol or ibuprofen and states massaging has been ineffective.    Past Medical History:   Diagnosis Date     Asthma      Diabetes mellitus (H)      Neuropathy      RA (rheumatoid arthritis) (H)        Past Surgical History:   Procedure Laterality Date     REALIGN PATELLA         No family history on file.    Social History   Substance Use Topics     Smoking status: Never Smoker     Smokeless tobacco: Never Used     Alcohol use No       No current facility-administered medications for this encounter.      Current Outpatient Prescriptions   Medication     albuterol (PROAIR HFA, PROVENTIL HFA, VENTOLIN HFA) 108 (90 BASE) MCG/ACT inhaler     fluticasone (FLONASE) 50 MCG/ACT spray     insulin aspart (NOVOLOG FLEXPEN) 100 UNIT/ML injection     insulin glargine " "(LANTUS) 100 UNIT/ML injection     montelukast (SINGULAIR) 10 MG tablet     omeprazole (PRILOSEC) 20 MG CR capsule     Pregabalin (LYRICA PO)     simvastatin (ZOCOR) 40 MG tablet        Allergies   Allergen Reactions     Cortisone      Hmg-Coa-R Inhibitors Other (See Comments)     Side effect: myalgias. Ok with zocor only     Penicillins        I have reviewed the Medications, Allergies, Past Medical and Surgical History, and Social History in the Epic system.    Review of Systems   Constitutional: Negative for fever.   Cardiovascular: Negative for chest pain.   Gastrointestinal: Negative for diarrhea and vomiting.   Musculoskeletal: Positive for neck pain (left-sided).        Positive for left shoulder to elbow, as well as left groin to buttock pain   Neurological: Negative for numbness.   All other systems reviewed and are negative.      Physical Exam   BP: 139/86  Pulse: 105  Temp: 98.3  F (36.8  C)  Resp: 16  Height: 165.1 cm (5' 5\")  Weight: 84.4 kg (186 lb)  SpO2: 94 %      Physical Exam     GEN:  Well developed, no acute distress  HEENT:  EOMI, Mucous membranes are moist.   Cardio:  RRR, no murmur, radial pulses equal bilaterally  PULM:  Lungs clear, good air movement, no wheezes, rales,   Abd:  Soft, normal bowel sounds, no focal tenderness  Musculoskeletal: No C-spine, T-spine, L-spine tenderness.  There is soft tissue tenderness along the left paraspinal muscles, but no erythema or ecchymosis, no crepitance in this area.  The left shoulder has no swelling or erythema, no crepitance, there is normal range of motion at the left shoulder and no focal bony tenderness in the shoulder or humerus.  The left groin area and left hip and left buttock areas have no swelling, erythema, crepitance, ecchymosis.  There is normal range of motion of the left hip without any increase in pain with range of motion testing at the hip. No lower extremity swelling or calf tenderness  Neuro:  Alert and oriented X3, normal " strength and sensation in all 4 extremities.  Skin:  Warm, dry    ED Course     ED Course     Procedures       9:35 PM  The patient was seen and examined by Dr. Linder in Room 10.      Critical Care time:  none  Patient was offered pain medicine in the emergency department and he declined.  X-rays of the C-spine, left shoulder and left hip were done and reviewed by me.  C-spine x-ray was discussed with radiology.  Results are shown below.  Results for orders placed or performed during the hospital encounter of 11/29/18   XR Shoulder Left G/E 3 Views    Narrative    PRELIMINARY REPORT - The following report is a preliminary  interpretation.         Impression    Impression:  No acute osseous abnormality.   XR Hip Left 2-3 Views    Narrative    PRELIMINARY REPORT - The following report is a preliminary  interpretation.         Impression    Impression: No acute fracture or subluxation.   XR Cervical Spine G/E 4 Views    Narrative    PRELIMINARY REPORT - The following report is a preliminary  interpretation.     Impression    Impression:   No acute fracture or subluxation.     X-ray results were discussed with the patient.  Patient has some concern because he is having difficulty walking and bearing weight on the left leg because of the left hip pain.  He did not initially mention that he was having difficulty walking and pain with weightbearing.  Due to this, MRI of the left hip will be done to evaluate for occult fracture or other cause of pain.  The MRI is pending at this time and the patient was signed out to the neck shift.      Labs Ordered and Resulted from Time of ED Arrival Up to the Time of Departure from the ED - No data to display         Assessments & Plan (with Medical Decision Making)   Patient presents with left shoulder pain for the last 6 weeks.  X-ray is negative.  Patient does have tenderness along the left trapezius muscle area and also reports that the pain radiates from that neck down to the  elbow.  It may be that he has cervical radiculopathy, or just muscle soreness.  I doubt a rotator cuff injury given his normal range of motion of the shoulder.  He was advised to take ibuprofen or Aleve and Tylenol for this and follow-up with his primary care physician.  Encouraged him to consider outpatient physical therapy as well.  The left hip pain has been present only for the last 2-3 days.  Again denies any trauma, there is no redness or swelling to suggest infection.  No recent surgery to cause increased risk of infection.  Normal range of motion at the hip with nonweightbearing.  I doubt acute septic joint, there is no neurovascular compromise.  MRI of the left hip is pending to evaluate for occult fracture.  Patient was signed out to the next shift with this MRI pending.    I have reviewed the nursing notes.    I have reviewed the findings, diagnosis, plan and need for follow up with the patient.    New Prescriptions    No medications on file       Final diagnoses:   Left shoulder pain, unspecified chronicity   Hip pain, acute, left   I, Sarwat Hendricks, am serving as a trained medical scribe to document services personally performed by Shilpa Linder MD, based on the provider's statements to me.   I, Shilpa Linder MD, was physically present and have reviewed and verified the accuracy of this note documented by Sarwat Hendricks.      11/29/2018   [facility], [place], EMERGENCY DEPARTMENT     Shilpa Linder MD  11/30/18 0037

## 2018-12-24 ENCOUNTER — APPOINTMENT (OUTPATIENT)
Dept: GENERAL RADIOLOGY | Facility: CLINIC | Age: 43
End: 2018-12-24
Attending: EMERGENCY MEDICINE

## 2018-12-24 ENCOUNTER — HOSPITAL ENCOUNTER (EMERGENCY)
Facility: CLINIC | Age: 43
Discharge: HOME | End: 2018-12-24
Attending: EMERGENCY MEDICINE | Admitting: EMERGENCY MEDICINE

## 2018-12-24 VITALS
SYSTOLIC BLOOD PRESSURE: 112 MMHG | RESPIRATION RATE: 16 BRPM | DIASTOLIC BLOOD PRESSURE: 72 MMHG | HEIGHT: 65 IN | TEMPERATURE: 98.3 F | OXYGEN SATURATION: 95 % | BODY MASS INDEX: 30.99 KG/M2 | HEART RATE: 94 BPM | WEIGHT: 186 LBS

## 2018-12-24 DIAGNOSIS — R73.9 HYPERGLYCEMIA: ICD-10-CM

## 2018-12-24 DIAGNOSIS — J06.9 VIRAL URI WITH COUGH: ICD-10-CM

## 2018-12-24 LAB
ANION GAP SERPL CALCULATED.3IONS-SCNC: 11 MMOL/L (ref 3–14)
BACTERIA SPEC CULT: NORMAL
BACTERIA SPEC CULT: NORMAL
BUN SERPL-MCNC: 17 MG/DL (ref 7–30)
CALCIUM SERPL-MCNC: 8.9 MG/DL (ref 8.5–10.1)
CHLORIDE SERPL-SCNC: 98 MMOL/L (ref 94–109)
CO2 SERPL-SCNC: 25 MMOL/L (ref 20–32)
CREAT SERPL-MCNC: 0.98 MG/DL (ref 0.66–1.25)
DEPRECATED S PYO AG THROAT QL EIA: NORMAL
GFR SERPL CREATININE-BSD FRML MDRD: >90 ML/MIN/{1.73_M2}
GLUCOSE BLDC GLUCOMTR-MCNC: 236 MG/DL (ref 70–99)
GLUCOSE SERPL-MCNC: 424 MG/DL (ref 70–99)
POTASSIUM SERPL-SCNC: 4 MMOL/L (ref 3.4–5.3)
SODIUM SERPL-SCNC: 134 MMOL/L (ref 133–144)
SPECIMEN SOURCE: NORMAL
SPECIMEN SOURCE: NORMAL

## 2018-12-24 PROCEDURE — 71046 X-RAY EXAM CHEST 2 VIEWS: CPT

## 2018-12-24 PROCEDURE — 99284 EMERGENCY DEPT VISIT MOD MDM: CPT | Mod: Z6 | Performed by: EMERGENCY MEDICINE

## 2018-12-24 PROCEDURE — 25000131 ZZH RX MED GY IP 250 OP 636 PS 637: Performed by: EMERGENCY MEDICINE

## 2018-12-24 PROCEDURE — 96372 THER/PROPH/DIAG INJ SC/IM: CPT | Mod: 59 | Performed by: EMERGENCY MEDICINE

## 2018-12-24 PROCEDURE — 25000128 H RX IP 250 OP 636: Performed by: EMERGENCY MEDICINE

## 2018-12-24 PROCEDURE — 87081 CULTURE SCREEN ONLY: CPT | Performed by: EMERGENCY MEDICINE

## 2018-12-24 PROCEDURE — 99284 EMERGENCY DEPT VISIT MOD MDM: CPT | Mod: 25 | Performed by: EMERGENCY MEDICINE

## 2018-12-24 PROCEDURE — 80048 BASIC METABOLIC PNL TOTAL CA: CPT | Performed by: EMERGENCY MEDICINE

## 2018-12-24 PROCEDURE — 96361 HYDRATE IV INFUSION ADD-ON: CPT | Performed by: EMERGENCY MEDICINE

## 2018-12-24 PROCEDURE — 00000146 ZZHCL STATISTIC GLUCOSE BY METER IP

## 2018-12-24 PROCEDURE — 96374 THER/PROPH/DIAG INJ IV PUSH: CPT | Performed by: EMERGENCY MEDICINE

## 2018-12-24 PROCEDURE — 87880 STREP A ASSAY W/OPTIC: CPT | Performed by: EMERGENCY MEDICINE

## 2018-12-24 RX ORDER — AZITHROMYCIN 250 MG/1
TABLET, FILM COATED ORAL
Qty: 6 TABLET | Refills: 0 | Status: SHIPPED | OUTPATIENT
Start: 2018-12-24 | End: 2018-12-29

## 2018-12-24 RX ORDER — AZITHROMYCIN 200 MG/5ML
POWDER, FOR SUSPENSION ORAL
Qty: 40 ML | Refills: 0 | Status: SHIPPED | OUTPATIENT
Start: 2018-12-24 | End: 2018-12-31

## 2018-12-24 RX ORDER — KETOROLAC TROMETHAMINE 30 MG/ML
30 INJECTION, SOLUTION INTRAMUSCULAR; INTRAVENOUS ONCE
Status: COMPLETED | OUTPATIENT
Start: 2018-12-24 | End: 2018-12-24

## 2018-12-24 RX ADMIN — INSULIN ASPART 15 UNITS: 100 INJECTION, SOLUTION INTRAVENOUS; SUBCUTANEOUS at 07:13

## 2018-12-24 RX ADMIN — SODIUM CHLORIDE 1000 ML: 9 INJECTION, SOLUTION INTRAVENOUS at 06:13

## 2018-12-24 RX ADMIN — KETOROLAC TROMETHAMINE 30 MG: 30 INJECTION, SOLUTION INTRAMUSCULAR at 07:12

## 2018-12-24 ASSESSMENT — ENCOUNTER SYMPTOMS
FEVER: 0
SORE THROAT: 1
SHORTNESS OF BREATH: 0
COUGH: 1
ABDOMINAL PAIN: 0

## 2018-12-24 ASSESSMENT — MIFFLIN-ST. JEOR: SCORE: 1655.57

## 2018-12-24 NOTE — DISCHARGE INSTRUCTIONS
Return with fever, shortness of breath, worsening or concerns. Follow up with your clinic doctor in 5-7 days if not improving. Monitor your sugar and take insulin as prescribed.

## 2018-12-24 NOTE — ED AVS SNAPSHOT
[facility], [place], Emergency Department  [address] [place] [address]  Phone:  [phone]                                    Israel Brown   MRN: [id number]    Department:  [facility], Emergency Department   Date of Visit:  12/24/2018           After Visit Summary Signature Page    I have received my discharge instructions, and my questions have been answered. I have discussed any challenges I see with this plan with the nurse or doctor.    ..........................................................................................................................................  Patient/Patient Representative Signature      ..........................................................................................................................................  Patient Representative Print Name and Relationship to Patient    ..................................................               ................................................  Date                                   Time    ..........................................................................................................................................  Reviewed by Signature/Title    ...................................................              ..............................................  Date                                               Time          22EPIC Rev 08/18

## 2018-12-24 NOTE — LETTER
December 24, 2018      To Whom It May Concern:      Israel Brown was seen in our Emergency Department today, 12/24/18.  I expect his condition to improve over the next 1-2 days.  He may return to work/school when improved.    Sincerely,        Mariella Abdalla MD

## 2018-12-24 NOTE — ED PROVIDER NOTES
"  History     Chief Complaint   Patient presents with     Pharyngitis     Lung pain     HPI  Israel Brown is a 43 year old male who presents to the ED with complaint of sore throat and cough.  He reports that his significant other has a cold currently.  He states that when he started to feel sick this past Wednesday, 5 days ago, he thought he was getting what she had.  He has had a cough which has been mostly nonproductive.  No fevers.  He states he has asthma and has been using his nebs without much improvement.  He is got some bilateral low chest wall achiness, which is worse with coughing.  No other abdominal pain, nausea, vomiting, diarrhea.  He states tonight when he was eating he noted that his throat was really sore.  No rash.  No recent travel.  No dysuria.  He is a non-smoker.    He also notes that his sugars were high tonight.  He states he is a, \"bad diabetic.\"  He states he drinks soda, energy drinks, and does not monitor sugar closely.  He states that sugar was 400 tonight.  He states he corrected for it with insulin.    Past Medical History:   Diagnosis Date     Asthma      Diabetes mellitus (H)      Neuropathy      RA (rheumatoid arthritis) (H)        Past Surgical History:   Procedure Laterality Date     REALIGN PATELLA         History reviewed. No pertinent family history.    Social History     Tobacco Use     Smoking status: Never Smoker     Smokeless tobacco: Never Used   Substance Use Topics     Alcohol use: No         I have reviewed the Medications, Allergies, Past Medical and Surgical History, and Social History in the Epic system.    Review of Systems   Constitutional: Negative for fever.   HENT: Positive for sore throat.    Respiratory: Positive for cough. Negative for shortness of breath.    Cardiovascular: Positive for chest pain.   Gastrointestinal: Negative for abdominal pain.   All other systems reviewed and are negative.      Physical Exam   BP: (!) 130/92  Pulse: 97  Temp: " "98.2  F (36.8  C)  Height: 165.1 cm (5' 5\")  Weight: 84.4 kg (186 lb)  SpO2: 94 %      Physical Exam   Constitutional: No distress.   HENT:   Head: Atraumatic.   Mild pharyngeal erythema. + post-nasal drip noted   Eyes: Pupils are equal, round, and reactive to light. No scleral icterus.   Cardiovascular: Normal heart sounds and intact distal pulses.   Pulmonary/Chest: Breath sounds normal. No respiratory distress. He exhibits tenderness.   Frequent coughing       Abdominal: Soft. There is no tenderness.   Musculoskeletal: He exhibits no edema or tenderness.   Lymphadenopathy:     He has no cervical adenopathy.   Skin: Skin is warm. No rash noted. He is not diaphoretic.       ED Course        Procedures             Critical Care time:  none             Labs Ordered and Resulted from Time of ED Arrival Up to the Time of Departure from the ED   BASIC METABOLIC PANEL - Abnormal; Notable for the following components:       Result Value    Glucose 424 (*)     All other components within normal limits   MAY SALINE LOCK IV   MAY SALINE LOCK IV   RAPID STREP SCREEN   BETA STREP GROUP A CULTURE            Assessments & Plan (with Medical Decision Making)   Patient is some diffuse chest soreness across his lower chest bilaterally, much worse with coughing.  Chest x-ray was done is unremarkable.  The patient has significant hyperglycemia, though admits he is a, \"bad diabetic,\" and does not frequently check his sugars, eats a lot of high glucose containing foods and drinks.  He was given a liter of fluid as well as insulin here.  He is also given some Toradol.  I did do a rapid strep, which is negative.  I did see quite a bit of postnasal drip on exam, suspect this is contributing to the sore throat.  I did discuss with him that this is likely a viral illness.  He has had no fever, and does not complain of significant body aches or headaches, and thus I think influenza is far less likely.  He is maintaining his sats, does not " have wheeze on exam, has good airflow.  In light of the fact that he is not controlling his sugars, I would be hesitant to prescribe a steroid for his underlying asthma in the absence of significant lung findings.  He is encouraged to continue his nebs as needed, use Tylenol as needed for comfort.  He is encouraged to return to the ER if new or worsening symptoms, follow-up primary care if not improving.  He is encouraged to more closely monitor sugars and use insulin as prescribed.  He will be signed out to the oncoming provider at shift change pending repeat sugar, to make sure it is coming down appropriately.    Dictation Disclaimer: Some of this Note has been completed with voice-recognition dictation software. Although errors are generally corrected real-time, there is the potential for a rare error to be present in the completed chart.      I have reviewed the nursing notes.    I have reviewed the findings, diagnosis, plan and need for follow up with the patient.       Medication List      There are no discharge medications for this visit.         Final diagnoses:   Viral URI with cough   Hyperglycemia       12/24/2018   [facility], [place], EMERGENCY DEPARTMENT     Mariella Abdalla MD  12/24/18 0712

## 2018-12-31 ENCOUNTER — APPOINTMENT (OUTPATIENT)
Dept: GENERAL RADIOLOGY | Facility: CLINIC | Age: 43
End: 2018-12-31
Attending: EMERGENCY MEDICINE
Payer: COMMERCIAL

## 2018-12-31 ENCOUNTER — APPOINTMENT (OUTPATIENT)
Dept: CT IMAGING | Facility: CLINIC | Age: 43
End: 2018-12-31
Attending: EMERGENCY MEDICINE
Payer: COMMERCIAL

## 2018-12-31 ENCOUNTER — HOSPITAL ENCOUNTER (EMERGENCY)
Facility: CLINIC | Age: 43
Discharge: HOME | End: 2018-12-31
Attending: EMERGENCY MEDICINE | Admitting: EMERGENCY MEDICINE
Payer: COMMERCIAL

## 2018-12-31 VITALS
BODY MASS INDEX: 30.82 KG/M2 | TEMPERATURE: 98.6 F | SYSTOLIC BLOOD PRESSURE: 142 MMHG | HEIGHT: 65 IN | HEART RATE: 82 BPM | OXYGEN SATURATION: 94 % | RESPIRATION RATE: 16 BRPM | DIASTOLIC BLOOD PRESSURE: 87 MMHG | WEIGHT: 185 LBS

## 2018-12-31 DIAGNOSIS — S80.01XA CONTUSION OF RIGHT KNEE, INITIAL ENCOUNTER: ICD-10-CM

## 2018-12-31 DIAGNOSIS — S16.1XXA STRAIN OF NECK MUSCLE, INITIAL ENCOUNTER: ICD-10-CM

## 2018-12-31 PROCEDURE — 73590 X-RAY EXAM OF LOWER LEG: CPT | Mod: RT

## 2018-12-31 PROCEDURE — 99284 EMERGENCY DEPT VISIT MOD MDM: CPT | Mod: 25

## 2018-12-31 PROCEDURE — 99284 EMERGENCY DEPT VISIT MOD MDM: CPT | Mod: Z6 | Performed by: EMERGENCY MEDICINE

## 2018-12-31 PROCEDURE — 72125 CT NECK SPINE W/O DYE: CPT

## 2018-12-31 PROCEDURE — 73562 X-RAY EXAM OF KNEE 3: CPT | Mod: RT

## 2018-12-31 ASSESSMENT — ENCOUNTER SYMPTOMS
NECK PAIN: 1
WOUND: 1

## 2018-12-31 ASSESSMENT — MIFFLIN-ST. JEOR: SCORE: 1651.03
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
## 2018-12-31 NOTE — ED AVS SNAPSHOT
[facility], [place], Emergency Department  [address] [place] [id number]  Phone:  [phone]                                    Israel Brown   MRN: [id number]    Department:  [facility], Emergency Department   Date of Visit:  12/31/2018           After Visit Summary Signature Page    I have received my discharge instructions, and my questions have been answered. I have discussed any challenges I see with this plan with the nurse or doctor.    ..........................................................................................................................................  Patient/Patient Representative Signature      ..........................................................................................................................................  Patient Representative Print Name and Relationship to Patient    ..................................................               ................................................  Date                                   Time    ..........................................................................................................................................  Reviewed by Signature/Title    ...................................................              ..............................................  Date                                               Time          22EPIC Rev 08/18

## 2018-12-31 NOTE — LETTER
December 31, 2018      To Whom It May Concern:      Israel Brown was seen in our Emergency Department today, 12/31/18.  I expect his condition to improve over the next 1 days.  He may return to work/school when improved.    Sincerely,        Dane Hayes MD

## 2019-01-12 ENCOUNTER — APPOINTMENT (OUTPATIENT)
Dept: GENERAL RADIOLOGY | Facility: CLINIC | Age: 44
End: 2019-01-12

## 2019-01-12 ENCOUNTER — HOSPITAL ENCOUNTER (OUTPATIENT)
Facility: CLINIC | Age: 44
Setting detail: OBSERVATION
Discharge: HOME | End: 2019-01-13
Attending: EMERGENCY MEDICINE | Admitting: EMERGENCY MEDICINE
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
## 2019-01-12 DIAGNOSIS — L03.115 CELLULITIS OF RIGHT LOWER EXTREMITY: ICD-10-CM

## 2019-01-12 DIAGNOSIS — E11.40: ICD-10-CM

## 2019-01-12 DIAGNOSIS — Z79.4 ENCOUNTER FOR LONG-TERM (CURRENT) USE OF INSULIN (H): ICD-10-CM

## 2019-01-12 PROBLEM — L03.90 CELLULITIS: Status: ACTIVE | Noted: 2019-01-12

## 2019-01-12 LAB
ABO + RH BLD: NORMAL
ABO + RH BLD: NORMAL
ANION GAP SERPL CALCULATED.3IONS-SCNC: 10 MMOL/L (ref 3–14)
BASOPHILS # BLD AUTO: 0 10E9/L (ref 0–0.2)
BASOPHILS NFR BLD AUTO: 0.4 %
BLD GP AB SCN SERPL QL: NORMAL
BLOOD BANK CMNT PATIENT-IMP: NORMAL
BUN SERPL-MCNC: 16 MG/DL (ref 7–30)
CALCIUM SERPL-MCNC: 8.4 MG/DL (ref 8.5–10.1)
CHLORIDE SERPL-SCNC: 101 MMOL/L (ref 94–109)
CO2 BLDCOV-SCNC: 24 MMOL/L (ref 21–28)
CO2 SERPL-SCNC: 24 MMOL/L (ref 20–32)
CREAT SERPL-MCNC: 1.04 MG/DL (ref 0.66–1.25)
CRP SERPL-MCNC: 10 MG/L (ref 0–8)
DIFFERENTIAL METHOD BLD: NORMAL
EOSINOPHIL # BLD AUTO: 0.4 10E9/L (ref 0–0.7)
EOSINOPHIL NFR BLD AUTO: 4.7 %
ERYTHROCYTE [DISTWIDTH] IN BLOOD BY AUTOMATED COUNT: 14.3 % (ref 10–15)
ERYTHROCYTE [SEDIMENTATION RATE] IN BLOOD BY WESTERGREN METHOD: 10 MM/H (ref 0–15)
GFR SERPL CREATININE-BSD FRML MDRD: 86 ML/MIN/{1.73_M2}
GLUCOSE BLDC GLUCOMTR-MCNC: 218 MG/DL (ref 70–99)
GLUCOSE BLDC GLUCOMTR-MCNC: 287 MG/DL (ref 70–99)
GLUCOSE BLDC GLUCOMTR-MCNC: 350 MG/DL (ref 70–99)
GLUCOSE SERPL-MCNC: 448 MG/DL (ref 70–99)
HCT VFR BLD AUTO: 44 % (ref 40–53)
HGB BLD-MCNC: 14.7 G/DL (ref 13.3–17.7)
IMM GRANULOCYTES # BLD: 0 10E9/L (ref 0–0.4)
IMM GRANULOCYTES NFR BLD: 0.3 %
INR PPP: 0.96 (ref 0.86–1.14)
LACTATE BLD-SCNC: 1.9 MMOL/L (ref 0.7–2.1)
LYMPHOCYTES # BLD AUTO: 1.6 10E9/L (ref 0.8–5.3)
LYMPHOCYTES NFR BLD AUTO: 21.3 %
MCH RBC QN AUTO: 28.8 PG (ref 26.5–33)
MCHC RBC AUTO-ENTMCNC: 33.4 G/DL (ref 31.5–36.5)
MCV RBC AUTO: 86 FL (ref 78–100)
MONOCYTES # BLD AUTO: 0.5 10E9/L (ref 0–1.3)
MONOCYTES NFR BLD AUTO: 7.3 %
NEUTROPHILS # BLD AUTO: 4.9 10E9/L (ref 1.6–8.3)
NEUTROPHILS NFR BLD AUTO: 66 %
NRBC # BLD AUTO: 0 10*3/UL
NRBC BLD AUTO-RTO: 0 /100
PCO2 BLDV: 41 MM HG (ref 40–50)
PH BLDV: 7.37 PH (ref 7.32–7.43)
PLATELET # BLD AUTO: 326 10E9/L (ref 150–450)
PO2 BLDV: 45 MM HG (ref 25–47)
POTASSIUM SERPL-SCNC: 4.2 MMOL/L (ref 3.4–5.3)
RBC # BLD AUTO: 5.11 10E12/L (ref 4.4–5.9)
SAO2 % BLDV FROM PO2: 80 %
SODIUM SERPL-SCNC: 135 MMOL/L (ref 133–144)
SPECIMEN EXP DATE BLD: NORMAL
WBC # BLD AUTO: 7.4 10E9/L (ref 4–11)

## 2019-01-12 PROCEDURE — 80048 BASIC METABOLIC PNL TOTAL CA: CPT | Performed by: EMERGENCY MEDICINE

## 2019-01-12 PROCEDURE — 86901 BLOOD TYPING SEROLOGIC RH(D): CPT | Performed by: EMERGENCY MEDICINE

## 2019-01-12 PROCEDURE — 73070 X-RAY EXAM OF ELBOW: CPT | Mod: LT

## 2019-01-12 PROCEDURE — 96375 TX/PRO/DX INJ NEW DRUG ADDON: CPT | Performed by: EMERGENCY MEDICINE

## 2019-01-12 PROCEDURE — 25000128 H RX IP 250 OP 636

## 2019-01-12 PROCEDURE — 96361 HYDRATE IV INFUSION ADD-ON: CPT | Performed by: EMERGENCY MEDICINE

## 2019-01-12 PROCEDURE — 25000132 ZZH RX MED GY IP 250 OP 250 PS 637: Performed by: EMERGENCY MEDICINE

## 2019-01-12 PROCEDURE — 25000128 H RX IP 250 OP 636: Performed by: EMERGENCY MEDICINE

## 2019-01-12 PROCEDURE — 96365 THER/PROPH/DIAG IV INF INIT: CPT | Performed by: EMERGENCY MEDICINE

## 2019-01-12 PROCEDURE — 86850 RBC ANTIBODY SCREEN: CPT | Performed by: EMERGENCY MEDICINE

## 2019-01-12 PROCEDURE — 00000146 ZZHCL STATISTIC GLUCOSE BY METER IP

## 2019-01-12 PROCEDURE — 86900 BLOOD TYPING SEROLOGIC ABO: CPT | Performed by: EMERGENCY MEDICINE

## 2019-01-12 PROCEDURE — 73110 X-RAY EXAM OF WRIST: CPT | Mod: LT

## 2019-01-12 PROCEDURE — 83605 ASSAY OF LACTIC ACID: CPT

## 2019-01-12 PROCEDURE — 96366 THER/PROPH/DIAG IV INF ADDON: CPT | Performed by: EMERGENCY MEDICINE

## 2019-01-12 PROCEDURE — 73502 X-RAY EXAM HIP UNI 2-3 VIEWS: CPT

## 2019-01-12 PROCEDURE — 85025 COMPLETE CBC W/AUTO DIFF WBC: CPT | Performed by: EMERGENCY MEDICINE

## 2019-01-12 PROCEDURE — 85610 PROTHROMBIN TIME: CPT | Performed by: EMERGENCY MEDICINE

## 2019-01-12 PROCEDURE — 99219 ZZC INITIAL OBSERVATION CARE,LEVL II: CPT | Mod: Z6 | Performed by: PHYSICIAN ASSISTANT

## 2019-01-12 PROCEDURE — 73560 X-RAY EXAM OF KNEE 1 OR 2: CPT | Mod: LT

## 2019-01-12 PROCEDURE — 73610 X-RAY EXAM OF ANKLE: CPT | Mod: LT

## 2019-01-12 PROCEDURE — 85652 RBC SED RATE AUTOMATED: CPT | Performed by: EMERGENCY MEDICINE

## 2019-01-12 PROCEDURE — 71046 X-RAY EXAM CHEST 2 VIEWS: CPT

## 2019-01-12 PROCEDURE — 86140 C-REACTIVE PROTEIN: CPT | Performed by: EMERGENCY MEDICINE

## 2019-01-12 PROCEDURE — 99285 EMERGENCY DEPT VISIT HI MDM: CPT | Mod: 25 | Performed by: EMERGENCY MEDICINE

## 2019-01-12 PROCEDURE — 82803 BLOOD GASES ANY COMBINATION: CPT

## 2019-01-12 RX ORDER — ONDANSETRON 2 MG/ML
4 INJECTION INTRAMUSCULAR; INTRAVENOUS EVERY 6 HOURS PRN
Status: DISCONTINUED | OUTPATIENT
Start: 2019-01-12 | End: 2019-01-13 | Stop reason: HOSPADM

## 2019-01-12 RX ORDER — SODIUM CHLORIDE 9 MG/ML
1000 INJECTION, SOLUTION INTRAVENOUS CONTINUOUS
Status: DISCONTINUED | OUTPATIENT
Start: 2019-01-12 | End: 2019-01-13 | Stop reason: HOSPADM

## 2019-01-12 RX ORDER — OXYCODONE HCL 5 MG/5 ML
5 SOLUTION, ORAL ORAL EVERY 4 HOURS PRN
Status: DISCONTINUED | OUTPATIENT
Start: 2019-01-12 | End: 2019-01-13 | Stop reason: HOSPADM

## 2019-01-12 RX ORDER — KETOROLAC TROMETHAMINE 30 MG/ML
30 INJECTION, SOLUTION INTRAMUSCULAR; INTRAVENOUS ONCE
Status: COMPLETED | OUTPATIENT
Start: 2019-01-12 | End: 2019-01-12

## 2019-01-12 RX ORDER — ACETAMINOPHEN 650 MG/1
650 SUPPOSITORY RECTAL EVERY 4 HOURS PRN
Status: DISCONTINUED | OUTPATIENT
Start: 2019-01-12 | End: 2019-01-13 | Stop reason: HOSPADM

## 2019-01-12 RX ORDER — HYDROMORPHONE HYDROCHLORIDE 1 MG/ML
1 INJECTION, SOLUTION INTRAMUSCULAR; INTRAVENOUS; SUBCUTANEOUS
Status: COMPLETED | OUTPATIENT
Start: 2019-01-12 | End: 2019-01-12

## 2019-01-12 RX ORDER — NALOXONE HYDROCHLORIDE 0.4 MG/ML
.1-.4 INJECTION, SOLUTION INTRAMUSCULAR; INTRAVENOUS; SUBCUTANEOUS
Status: DISCONTINUED | OUTPATIENT
Start: 2019-01-12 | End: 2019-01-13 | Stop reason: HOSPADM

## 2019-01-12 RX ORDER — ACETAMINOPHEN 325 MG/1
650 TABLET ORAL EVERY 4 HOURS PRN
Status: DISCONTINUED | OUTPATIENT
Start: 2019-01-12 | End: 2019-01-13 | Stop reason: HOSPADM

## 2019-01-12 RX ORDER — ONDANSETRON 4 MG/1
4 TABLET, ORALLY DISINTEGRATING ORAL EVERY 6 HOURS PRN
Status: DISCONTINUED | OUTPATIENT
Start: 2019-01-12 | End: 2019-01-13 | Stop reason: HOSPADM

## 2019-01-12 RX ORDER — HYDROCODONE BITARTRATE AND ACETAMINOPHEN 5; 325 MG/1; MG/1
1-2 TABLET ORAL EVERY 4 HOURS PRN
Status: DISCONTINUED | OUTPATIENT
Start: 2019-01-12 | End: 2019-01-12

## 2019-01-12 RX ADMIN — VANCOMYCIN HYDROCHLORIDE 1750 MG: 10 INJECTION, POWDER, LYOPHILIZED, FOR SOLUTION INTRAVENOUS at 21:22

## 2019-01-12 RX ADMIN — SODIUM CHLORIDE 1000 ML: 9 INJECTION, SOLUTION INTRAVENOUS at 20:02

## 2019-01-12 RX ADMIN — Medication 1 MG: at 20:02

## 2019-01-12 RX ADMIN — SODIUM CHLORIDE 1000 ML: 9 INJECTION, SOLUTION INTRAVENOUS at 21:02

## 2019-01-12 RX ADMIN — KETOROLAC TROMETHAMINE 30 MG: 30 INJECTION, SOLUTION INTRAMUSCULAR at 23:14

## 2019-01-12 RX ADMIN — HUMAN INSULIN 6 UNITS: 100 INJECTION, SOLUTION SUBCUTANEOUS at 21:25

## 2019-01-12 ASSESSMENT — ENCOUNTER SYMPTOMS
NUMBNESS: 1
HEADACHES: 0
CHILLS: 0
NECK PAIN: 0
NECK STIFFNESS: 0
WOUND: 1
FEVER: 0

## 2019-01-12 NOTE — LETTER
Date: Jan 12, 2019    TO WHOM IT MAY CONCERN:    Patient Israel Brown was in the hospital on Jan 12, 2019 to Jan 13, 2019.  Please excuse him from work. He may return to work on 1/15/2019    Deirdre Nice PA-C

## 2019-01-13 VITALS
OXYGEN SATURATION: 96 % | SYSTOLIC BLOOD PRESSURE: 126 MMHG | DIASTOLIC BLOOD PRESSURE: 82 MMHG | WEIGHT: 198.85 LBS | TEMPERATURE: 97.9 F | RESPIRATION RATE: 16 BRPM | HEART RATE: 85 BPM | BODY MASS INDEX: 33.09 KG/M2

## 2019-01-13 LAB
ANION GAP SERPL CALCULATED.3IONS-SCNC: 5 MMOL/L (ref 3–14)
BASOPHILS # BLD AUTO: 0.1 10E9/L (ref 0–0.2)
BASOPHILS NFR BLD AUTO: 1 %
BUN SERPL-MCNC: 18 MG/DL (ref 7–30)
CALCIUM SERPL-MCNC: 8.2 MG/DL (ref 8.5–10.1)
CHLORIDE SERPL-SCNC: 107 MMOL/L (ref 94–109)
CO2 SERPL-SCNC: 26 MMOL/L (ref 20–32)
CREAT SERPL-MCNC: 0.85 MG/DL (ref 0.66–1.25)
DIFFERENTIAL METHOD BLD: NORMAL
EOSINOPHIL # BLD AUTO: 0.5 10E9/L (ref 0–0.7)
EOSINOPHIL NFR BLD AUTO: 7.8 %
ERYTHROCYTE [DISTWIDTH] IN BLOOD BY AUTOMATED COUNT: 14.2 % (ref 10–15)
GFR SERPL CREATININE-BSD FRML MDRD: >90 ML/MIN/{1.73_M2}
GLUCOSE BLDC GLUCOMTR-MCNC: 171 MG/DL (ref 70–99)
GLUCOSE BLDC GLUCOMTR-MCNC: 294 MG/DL (ref 70–99)
GLUCOSE SERPL-MCNC: 209 MG/DL (ref 70–99)
HCT VFR BLD AUTO: 43.3 % (ref 40–53)
HGB BLD-MCNC: 14.2 G/DL (ref 13.3–17.7)
IMM GRANULOCYTES # BLD: 0 10E9/L (ref 0–0.4)
IMM GRANULOCYTES NFR BLD: 0.2 %
LYMPHOCYTES # BLD AUTO: 1.9 10E9/L (ref 0.8–5.3)
LYMPHOCYTES NFR BLD AUTO: 31.8 %
MCH RBC QN AUTO: 28.4 PG (ref 26.5–33)
MCHC RBC AUTO-ENTMCNC: 32.8 G/DL (ref 31.5–36.5)
MCV RBC AUTO: 87 FL (ref 78–100)
MONOCYTES # BLD AUTO: 0.3 10E9/L (ref 0–1.3)
MONOCYTES NFR BLD AUTO: 5.1 %
NEUTROPHILS # BLD AUTO: 3.3 10E9/L (ref 1.6–8.3)
NEUTROPHILS NFR BLD AUTO: 54.1 %
NRBC # BLD AUTO: 0 10*3/UL
NRBC BLD AUTO-RTO: 0 /100
PLATELET # BLD AUTO: 294 10E9/L (ref 150–450)
POTASSIUM SERPL-SCNC: 4 MMOL/L (ref 3.4–5.3)
RBC # BLD AUTO: 5 10E12/L (ref 4.4–5.9)
SODIUM SERPL-SCNC: 138 MMOL/L (ref 133–144)
WBC # BLD AUTO: 6 10E9/L (ref 4–11)

## 2019-01-13 PROCEDURE — G0378 HOSPITAL OBSERVATION PER HR: HCPCS

## 2019-01-13 PROCEDURE — 80048 BASIC METABOLIC PNL TOTAL CA: CPT | Performed by: PHYSICIAN ASSISTANT

## 2019-01-13 PROCEDURE — 99217 ZZC OBSERVATION CARE DISCHARGE: CPT | Mod: Z6 | Performed by: EMERGENCY MEDICINE

## 2019-01-13 PROCEDURE — 96372 THER/PROPH/DIAG INJ SC/IM: CPT

## 2019-01-13 PROCEDURE — 25000132 ZZH RX MED GY IP 250 OP 250 PS 637: Performed by: PHYSICIAN ASSISTANT

## 2019-01-13 PROCEDURE — 25000128 H RX IP 250 OP 636

## 2019-01-13 PROCEDURE — 85025 COMPLETE CBC W/AUTO DIFF WBC: CPT | Performed by: PHYSICIAN ASSISTANT

## 2019-01-13 PROCEDURE — 96376 TX/PRO/DX INJ SAME DRUG ADON: CPT

## 2019-01-13 PROCEDURE — 40000893 ZZH STATISTIC PT IP EVAL DEFER

## 2019-01-13 PROCEDURE — 36415 COLL VENOUS BLD VENIPUNCTURE: CPT | Performed by: PHYSICIAN ASSISTANT

## 2019-01-13 PROCEDURE — 00000146 ZZHCL STATISTIC GLUCOSE BY METER IP

## 2019-01-13 PROCEDURE — 25000128 H RX IP 250 OP 636: Performed by: EMERGENCY MEDICINE

## 2019-01-13 PROCEDURE — 25000131 ZZH RX MED GY IP 250 OP 636 PS 637: Performed by: PHYSICIAN ASSISTANT

## 2019-01-13 RX ORDER — CLINDAMYCIN HCL 300 MG
300 CAPSULE ORAL 3 TIMES DAILY
Qty: 30 CAPSULE | Refills: 0 | Status: SHIPPED | OUTPATIENT
Start: 2019-01-13 | End: 2019-01-23

## 2019-01-13 RX ORDER — LIDOCAINE 40 MG/G
CREAM TOPICAL
Status: DISCONTINUED | OUTPATIENT
Start: 2019-01-13 | End: 2019-01-13 | Stop reason: HOSPADM

## 2019-01-13 RX ORDER — ACETAMINOPHEN 325 MG/1
650 TABLET ORAL EVERY 4 HOURS PRN
Status: DISCONTINUED | OUTPATIENT
Start: 2019-01-13 | End: 2019-01-13

## 2019-01-13 RX ORDER — NICOTINE POLACRILEX 4 MG
15-30 LOZENGE BUCCAL
Status: DISCONTINUED | OUTPATIENT
Start: 2019-01-13 | End: 2019-01-13

## 2019-01-13 RX ORDER — DEXTROSE MONOHYDRATE 25 G/50ML
25-50 INJECTION, SOLUTION INTRAVENOUS
Status: DISCONTINUED | OUTPATIENT
Start: 2019-01-13 | End: 2019-01-13

## 2019-01-13 RX ORDER — CIPROFLOXACIN 500 MG/1
500 TABLET, FILM COATED ORAL 2 TIMES DAILY
Qty: 20 TABLET | Refills: 0 | Status: SHIPPED | OUTPATIENT
Start: 2019-01-13 | End: 2019-01-23

## 2019-01-13 RX ORDER — NICOTINE POLACRILEX 4 MG
15-30 LOZENGE BUCCAL
Status: DISCONTINUED | OUTPATIENT
Start: 2019-01-13 | End: 2019-01-13 | Stop reason: HOSPADM

## 2019-01-13 RX ORDER — METOCLOPRAMIDE HYDROCHLORIDE 5 MG/ML
10 INJECTION INTRAMUSCULAR; INTRAVENOUS EVERY 6 HOURS PRN
Status: DISCONTINUED | OUTPATIENT
Start: 2019-01-13 | End: 2019-01-13 | Stop reason: HOSPADM

## 2019-01-13 RX ORDER — ONDANSETRON 2 MG/ML
4 INJECTION INTRAMUSCULAR; INTRAVENOUS EVERY 6 HOURS PRN
Status: DISCONTINUED | OUTPATIENT
Start: 2019-01-13 | End: 2019-01-13

## 2019-01-13 RX ORDER — DEXTROSE MONOHYDRATE 25 G/50ML
25-50 INJECTION, SOLUTION INTRAVENOUS
Status: DISCONTINUED | OUTPATIENT
Start: 2019-01-13 | End: 2019-01-13 | Stop reason: HOSPADM

## 2019-01-13 RX ORDER — ACETAMINOPHEN 650 MG/1
650 SUPPOSITORY RECTAL EVERY 4 HOURS PRN
Status: DISCONTINUED | OUTPATIENT
Start: 2019-01-13 | End: 2019-01-13

## 2019-01-13 RX ADMIN — SODIUM CHLORIDE 1000 ML: 9 INJECTION, SOLUTION INTRAVENOUS at 04:23

## 2019-01-13 RX ADMIN — VANCOMYCIN HYDROCHLORIDE 1750 MG: 10 INJECTION, POWDER, LYOPHILIZED, FOR SOLUTION INTRAVENOUS at 08:55

## 2019-01-13 RX ADMIN — OXYCODONE HYDROCHLORIDE 5 MG: 5 SOLUTION ORAL at 00:16

## 2019-01-13 RX ADMIN — INSULIN GLARGINE 75 UNITS: 100 INJECTION, SOLUTION SUBCUTANEOUS at 08:11

## 2019-01-13 RX ADMIN — OMEPRAZOLE 20 MG: 20 CAPSULE, DELAYED RELEASE ORAL at 07:58

## 2019-01-13 NOTE — PROGRESS NOTES
Pt understood discharge orders/instructions: Yes.  Pt's belongings : Pt took.  Discharge medications : Pt will pick it up at discharge pharmacy.  Lines : PIV was removed.  How is pt getting home/transportion : Pt's wife at bedside and will take home.  Discharge papers : Given to the pt.  Follow up appt : As stated on the discharge papers.  Home supply : N/A.

## 2019-01-13 NOTE — DISCHARGE SUMMARY
Discharge Summary    Israel Brown MRN# [id number]   YOB: 1975 Age: 43 year old     Date of Admission:  1/12/2019  Date of Discharge:  1/13/2019 12:52 PM  Admitting Physician:  Shilpa Linder MD  Discharge Physician:  SHILPA LINDER  Discharging Service:  Emergency Department Observation Unit     Primary Provider: Edmund Olson          Discharge Diagnosis:   Cellulitis  Mechanical fall             Discharge Disposition:   Discharged to home           Condition on Discharge:   Discharge condition: Stable   Code status on discharge: Full Code           Procedures:   No procedures performed during this admission          Discharge Medications:     Current Discharge Medication List      START taking these medications    Details   ciprofloxacin (CIPRO) 500 MG tablet Take 1 tablet (500 mg) by mouth 2 times daily for 10 days  Qty: 20 tablet, Refills: 0    Associated Diagnoses: Cellulitis of right lower extremity      clindamycin (CLEOCIN) 300 MG capsule Take 1 capsule (300 mg) by mouth 3 times daily for 10 days  Qty: 30 capsule, Refills: 0    Associated Diagnoses: Cellulitis of right lower extremity         CONTINUE these medications which have NOT CHANGED    Details   albuterol (PROAIR HFA, PROVENTIL HFA, VENTOLIN HFA) 108 (90 BASE) MCG/ACT inhaler Inhale 2 puffs into the lungs every 6 hours      fluticasone (FLONASE) 50 MCG/ACT spray Spray 1 spray in nostril daily      insulin aspart (NOVOLOG FLEXPEN) 100 UNIT/ML injection Inject Subcutaneous 3 times daily (with meals)      insulin glargine (LANTUS) 100 UNIT/ML injection Inject 60 Units Subcutaneous every morning      montelukast (SINGULAIR) 10 MG tablet Take 10 mg by mouth At Bedtime      omeprazole (PRILOSEC) 20 MG CR capsule Take 20 mg by mouth daily      Pregabalin (LYRICA PO)       simvastatin (ZOCOR) 40 MG tablet Take 40 mg by mouth At Bedtime                   Consultations:   No consultations were requested during this  admission             Brief History of Illness:   Israel Brown is a 43 year old male with a history of asthma, diabetes, neuropathy, and RA who presents to the ED for evaluation after an unwitnessed fall around 2pm          Hospital Course:   1. Mechanical Fall: Israel Brown is a 43 year old male with a history of asthma, diabetes, neuropathy, and RA who presents to the ED for evaluation after an unwitnessed mechanical fall. He sustained left hip, left wrist, and left lower extremity pain. He also reports chronic left shoulder pain that has not changed. All imaging done in the ED were negative for fracture. Pain was managed with tylenol and prn oxycodone with moderate improvement of pain. Patient was able to ambulate without difficulties this morning.      2. Cellulitis: Penicillin allergy, started vanco in the ED. Received 1750mg x and received another dose this morning. Redness on the right shin seems to be improving this morning. Patient remains afebrile. No nausea or vomiting. Repeat CBC without leukocytosis. Patient discharged with Doxycycline and Ciprofloxacin. Advised to follow up with primary in 3 days and if right shin wound is not improving to have tib/fib xray done by PCP.   -Consider Xray tib/fib in 3 days if right shin wound not improving.       3. DM type II  - Continue lantus 75units qam (home dose)      4. Neuropathy  - Continue with home lyrica                   Final Day of Progress before Discharge:       Physical Exam:  Blood pressure 126/82, pulse 85, temperature 97.9  F (36.6  C), temperature source Oral, resp. rate 16, weight 90.2 kg (198 lb 13.7 oz), SpO2 96 %.    EXAM:  Physical Exam   Constitutional: Pt is oriented to person, place, and time.Pt appears well-developed and well-nourished.   HENT:   Head: Normocephalic and atraumatic.   Eyes: Conjunctivae are normal. Pupils are equal, round, and reactive to light.   Neck: Normal range of motion. Neck supple.   Cardiovascular:  Normal rate, regular rhythm, normal heart sounds and intact distal pulses.    Pulmonary/Chest: Effort normal and breath sounds normal. No respiratory distress. Pt has no wheezes. Pt has no rales  Abdominal: Soft. Bowel sounds are normal. Pt exhibits no distension and no mass. No tenderness. Pt has no rebound and no guarding.   Musculoskeletal: Normal range of motion. Pt exhibits no edema.   Neurological: Pt is alert and oriented to person, place, and time. Normal reflexes.   Skin: Skin is warm and dry. No rash noted.   Psychiatric: Pt has a normal mood and affect. Behavior is normal. Judgment and thought content normal.             Data:  All laboratory data reviewed             Significant Results:   None  Results for orders placed or performed during the hospital encounter of 01/12/19   XR Chest 2 Views    Narrative     Examination:  XR CHEST 2 VW     Date:  1/12/2019 8:48 PM      Clinical Information: trauma     Additional Information: none    Comparison: 12/24/2018    Findings:   Pulmonary vasculature is distinct. Shallow inspiration. Lungs and  pleural spaces are clear. No acute osseous injury in the chest.      Impression    Impression:  1. Clear lungs    CARA AHN MD   XR Ankle Left G/E 3 Views    Narrative    3 views left ankle radiographs 1/12/2019 9:18 PM    History: trauma    Additional History from EMR: Patient slipped on ice.    Comparison: None available    Findings:    Standing AP, oblique, and lateral  views of the left ankle were  obtained.     No acute osseous abnormality.      Ankle mortise and syndesmosis are congruent on this non-weight bearing  images.    Achilles tendon insertional and plantar calcaneal enthesopathy.     Soft tissue is unremarkable.      Impression    Impression:  1. No acute osseous abnormality.  2. No substantial degenerative change.  3. Note the patient's foot is in plantarflexion. If there is high  clinical concern for foot or ankle injury recommend repeating  film in  neutral position.    I have personally reviewed the examination and initial interpretation  and I agree with the findings.    CARA AHN MD   XR Hip Left 2-3 Views    Narrative     Examination:  XR HIP LEFT 2-3 VIEWS     Date:  1/12/2019 8:45 PM      Clinical Information: trauma     Additional Information: none    Comparison: MR hip 11/30/2018    Findings:   No fracture of the bones of the left hip. No dislocation.      Impression    Impression:  1. No fracture of the left hip.    CARA AHN MD   Elbow XR,  2 views, left    Narrative     Examination:  XR ELBOW LT 2 VW     Date:  1/12/2019 8:49 PM      Clinical Information: trauma     Additional Information: none    Comparison: none    Findings:   No fracture or dislocation of the left elbow. No joint effusion.      Impression    Impression:  1. No fracture of the bones of the left elbow.     CARA AHN MD   XR Wrist Left G/E 3 Views    Narrative     Examination:  XR WRIST LEFT G/E 3 VIEWS     Date:  1/12/2019 8:44 PM      Clinical Information: trauma     Additional Information: none    Comparison: none    Findings:   The bones and joints of the left wrist are well-preserved.  No fracture or dislocation is demonstrated.      Impression    Impression:  1. No fracture demonstrated. Note scaphoid fractures can be  radiographically occult initially.    CARA AHN MD   XR Knee Left 1/2 Views    Narrative     Examination:  XR KNEE LT 1/2 VW     Date:  1/12/2019 8:47 PM      Clinical Information: trauma     Additional Information: none    Comparison: 6/12/2018     Findings:   Degenerative changes of the left knee. No joint effusion or fracture.      Impression    Impression:  1. Degenerative changes of left knee, no fracture demonstrated.  Unchanged from 12/31/2018.     CARA AHN MD   CBC with platelets differential   Result Value Ref Range    WBC 7.4 4.0 - 11.0 10e9/L    RBC Count 5.11 4.4 - 5.9 10e12/L    Hemoglobin  14.7 13.3 - 17.7 g/dL    Hematocrit 44.0 40.0 - 53.0 %    MCV 86 78 - 100 fl    MCH 28.8 26.5 - 33.0 pg    MCHC 33.4 31.5 - 36.5 g/dL    RDW 14.3 10.0 - 15.0 %    Platelet Count 326 150 - 450 10e9/L    Diff Method Automated Method     % Neutrophils 66.0 %    % Lymphocytes 21.3 %    % Monocytes 7.3 %    % Eosinophils 4.7 %    % Basophils 0.4 %    % Immature Granulocytes 0.3 %    Nucleated RBCs 0 0 /100    Absolute Neutrophil 4.9 1.6 - 8.3 10e9/L    Absolute Lymphocytes 1.6 0.8 - 5.3 10e9/L    Absolute Monocytes 0.5 0.0 - 1.3 10e9/L    Absolute Eosinophils 0.4 0.0 - 0.7 10e9/L    Absolute Basophils 0.0 0.0 - 0.2 10e9/L    Abs Immature Granulocytes 0.0 0 - 0.4 10e9/L    Absolute Nucleated RBC 0.0    INR   Result Value Ref Range    INR 0.96 0.86 - 1.14   Basic metabolic panel   Result Value Ref Range    Sodium 135 133 - 144 mmol/L    Potassium 4.2 3.4 - 5.3 mmol/L    Chloride 101 94 - 109 mmol/L    Carbon Dioxide 24 20 - 32 mmol/L    Anion Gap 10 3 - 14 mmol/L    Glucose 448 (H) 70 - 99 mg/dL    Urea Nitrogen 16 7 - 30 mg/dL    Creatinine 1.04 0.66 - 1.25 mg/dL    GFR Estimate 86 >60 mL/min/[1.73_m2]    GFR Estimate If Black >90 >60 mL/min/[1.73_m2]    Calcium 8.4 (L) 8.5 - 10.1 mg/dL   CRP inflammation   Result Value Ref Range    CRP Inflammation 10.0 (H) 0.0 - 8.0 mg/L   Erythrocyte sedimentation rate auto   Result Value Ref Range    Sed Rate 10 0 - 15 mm/h   Glucose by meter   Result Value Ref Range    Glucose 350 (H) 70 - 99 mg/dL   Glucose by meter   Result Value Ref Range    Glucose 287 (H) 70 - 99 mg/dL   Glucose by meter   Result Value Ref Range    Glucose 218 (H) 70 - 99 mg/dL   Basic metabolic panel   Result Value Ref Range    Sodium 138 133 - 144 mmol/L    Potassium 4.0 3.4 - 5.3 mmol/L    Chloride 107 94 - 109 mmol/L    Carbon Dioxide 26 20 - 32 mmol/L    Anion Gap 5 3 - 14 mmol/L    Glucose 209 (H) 70 - 99 mg/dL    Urea Nitrogen 18 7 - 30 mg/dL    Creatinine 0.85 0.66 - 1.25 mg/dL    GFR Estimate >90 >60  mL/min/[1.73_m2]    GFR Estimate If Black >90 >60 mL/min/[1.73_m2]    Calcium 8.2 (L) 8.5 - 10.1 mg/dL   CBC with platelets differential   Result Value Ref Range    WBC 6.0 4.0 - 11.0 10e9/L    RBC Count 5.00 4.4 - 5.9 10e12/L    Hemoglobin 14.2 13.3 - 17.7 g/dL    Hematocrit 43.3 40.0 - 53.0 %    MCV 87 78 - 100 fl    MCH 28.4 26.5 - 33.0 pg    MCHC 32.8 31.5 - 36.5 g/dL    RDW 14.2 10.0 - 15.0 %    Platelet Count 294 150 - 450 10e9/L    Diff Method Automated Method     % Neutrophils 54.1 %    % Lymphocytes 31.8 %    % Monocytes 5.1 %    % Eosinophils 7.8 %    % Basophils 1.0 %    % Immature Granulocytes 0.2 %    Nucleated RBCs 0 0 /100    Absolute Neutrophil 3.3 1.6 - 8.3 10e9/L    Absolute Lymphocytes 1.9 0.8 - 5.3 10e9/L    Absolute Monocytes 0.3 0.0 - 1.3 10e9/L    Absolute Eosinophils 0.5 0.0 - 0.7 10e9/L    Absolute Basophils 0.1 0.0 - 0.2 10e9/L    Abs Immature Granulocytes 0.0 0 - 0.4 10e9/L    Absolute Nucleated RBC 0.0    ISTAT gases lactate dina POCT   Result Value Ref Range    Ph Venous 7.37 7.32 - 7.43 pH    PCO2 Venous 41 40 - 50 mm Hg    PO2 Venous 45 25 - 47 mm Hg    Bicarbonate Venous 24 21 - 28 mmol/L    O2 Sat Venous 80 %    Lactic Acid 1.9 0.7 - 2.1 mmol/L   ABO/Rh type and screen   Result Value Ref Range    ABO A     RH(D) Pos     Antibody Screen Neg     Test Valid Only At          [facility]    Specimen Expires 01/15/2019       Recent Results (from the past 48 hour(s))   XR Wrist Left G/E 3 Views    Narrative     Examination:  XR WRIST LEFT G/E 3 VIEWS     Date:  1/12/2019 8:44 PM      Clinical Information: trauma     Additional Information: none    Comparison: none    Findings:   The bones and joints of the left wrist are well-preserved.  No fracture or dislocation is demonstrated.      Impression    Impression:  1. No fracture demonstrated. Note scaphoid fractures can be  radiographically occult initially.    CARA AHN MD   XR Hip Left  2-3 Views    Narrative     Examination:  XR HIP LEFT 2-3 VIEWS     Date:  1/12/2019 8:45 PM      Clinical Information: trauma     Additional Information: none    Comparison: MR hip 11/30/2018    Findings:   No fracture of the bones of the left hip. No dislocation.      Impression    Impression:  1. No fracture of the left hip.    CARA AHN MD   XR Knee Left 1/2 Views    Narrative     Examination:  XR KNEE LT 1/2 VW     Date:  1/12/2019 8:47 PM      Clinical Information: trauma     Additional Information: none    Comparison: 6/12/2018     Findings:   Degenerative changes of the left knee. No joint effusion or fracture.      Impression    Impression:  1. Degenerative changes of left knee, no fracture demonstrated.  Unchanged from 12/31/2018.     CARA AHN MD   XR Ankle Left G/E 3 Views    Narrative    3 views left ankle radiographs 1/12/2019 9:18 PM    History: trauma    Additional History from EMR: Patient slipped on ice.    Comparison: None available    Findings:    Standing AP, oblique, and lateral  views of the left ankle were  obtained.     No acute osseous abnormality.      Ankle mortise and syndesmosis are congruent on this non-weight bearing  images.    Achilles tendon insertional and plantar calcaneal enthesopathy.     Soft tissue is unremarkable.      Impression    Impression:  1. No acute osseous abnormality.  2. No substantial degenerative change.  3. Note the patient's foot is in plantarflexion. If there is high  clinical concern for foot or ankle injury recommend repeating film in  neutral position.    I have personally reviewed the examination and initial interpretation  and I agree with the findings.    CARA AHN MD   XR Chest 2 Views    Narrative     Examination:  XR CHEST 2 VW     Date:  1/12/2019 8:48 PM      Clinical Information: trauma     Additional Information: none    Comparison: 12/24/2018    Findings:   Pulmonary vasculature is distinct. Shallow inspiration.  Lungs and  pleural spaces are clear. No acute osseous injury in the chest.      Impression    Impression:  1. Clear lungs    CARA AHN MD   Elbow XR,  2 views, left    Narrative     Examination:  XR ELBOW LT 2 VW     Date:  1/12/2019 8:49 PM      Clinical Information: trauma     Additional Information: none    Comparison: none    Findings:   No fracture or dislocation of the left elbow. No joint effusion.      Impression    Impression:  1. No fracture of the bones of the left elbow.     CARA AHN MD                Pending Results:   Unresulted Labs Ordered in the Past 30 Days of this Admission     No orders found for last 61 day(s).                  Discharge Instructions and Follow-Up:     Discharge Procedure Orders   Reason for your hospital stay   Order Comments: Cellulitis     Follow Up and recommended labs and tests   Order Comments: Follow up with primary in 3 days  Recommend X-ray of the TIP/FIB in 3 days if symptoms not improving of worsening.     Activity   Order Comments: Your activity upon discharge: activity as tolerated     Order Specific Question Answer Comments   Is discharge order? Yes      When to contact your care team   Order Comments: Return to the ED with fever, uncontrolled nausea, vomiting, unrelieved pain, dizziness, chest pain, shortness of breath, loss of consciousness, and any new or concerning symptoms.     Discharge Instructions   Order Comments: You were admitted for left hip and shoulder after mechanical fall. You have been treated with pain medication and this is improving. You were also treated with IV antibiotics for right shin cellulitis. You will continue with oral antibiotic for another 7 days. Continue to take Tylenol/ibuprofen for pain management at home. Follow up with primary in 3 days. Recommend X-ray of the TIP/FIB in 3 days if symptoms not improving of worsening.     Full Code     Order Specific Question Answer Comments   Code status determined by:  Discussion with patient/legal decision maker      Diet   Order Comments: Follow this diet upon discharge: Regular     Order Specific Question Answer Comments   Is discharge order? Yes           Attestation:  Deirdre JUANCARLOS Nice PA-C

## 2019-01-13 NOTE — PHARMACY-VANCOMYCIN DOSING SERVICE
Pharmacy Vancomycin Initial Note  Date of Service 2019  Patient's  1975  43 year old, male    Indication: Skin and Soft Tissue Infection    Current estimated CrCl = Estimated Creatinine Clearance: 92.6 mL/min (based on SCr of 1.04 mg/dL).    Creatinine for last 3 days  2019:  8:03 PM Creatinine 1.04 mg/dL    Recent Vancomycin Level(s) for last 3 days  No results found for requested labs within last 72 hours.      Vancomycin IV Administrations (past 72 hours)      No vancomycin orders with administrations in past 72 hours.                Nephrotoxins and other renal medications (From now, onward)    None          Contrast Orders - past 72 hours (72h ago, onward)    None                Plan:  1.  Start vancomycin  1750 mg (~19 mg/kg) IV q12h.   2.  Goal Trough Level: 15-20 mg/L  3.  Pharmacy will check trough levels as appropriate in 1-3 Days.    4. Serum creatinine levels will be ordered daily for the first week of therapy and at least twice weekly for subsequent weeks.    5. [place] method utilized to dose vancomycin therapy: Method 2    Ann Marie Pinon, PharmD  PGY2 Infectious Diseases Pharmacy Resident  Pager: [phone]

## 2019-01-13 NOTE — PLAN OF CARE
Outpatient/Observation goals to be met before discharge home:     -diagnostic tests and consults completed and resulted: NO    -vital signs normal or at patient baseline: YES    - improvement in cellulitis: PENDING    - pain control: YES    - BG control: NO

## 2019-01-13 NOTE — ED NOTES
[facility] [place]   ED Nurse to Floor Handoff     Israel Brown is a 43 year old male who speaks English and lives with a spouse,  in a home  They arrived in the ED by car from home    ED Chief Complaint: Fall    ED Dx;   Final diagnoses:   Cellulitis of right lower extremity         Needed?: No    Allergies:   Allergies   Allergen Reactions     Cortisone      Hmg-Coa-R Inhibitors Other (See Comments)     Side effect: myalgias. Ok with zocor only     Penicillins    .  Past Medical Hx:   Past Medical History:   Diagnosis Date     Asthma      Diabetes mellitus (H)      Neuropathy      RA (rheumatoid arthritis) (H)       Baseline Mental status: WDL  Current Mental Status changes: at basesline    Infection present or suspected this encounter: no  Sepsis suspected: No  Isolation type: No active isolations     Activity level - Baseline/Home:  Independent  Activity Level - Current:   Stand with Assist    Bariatric equipment needed?: No    In the ED these meds were given:   Medications   0.9% sodium chloride BOLUS (0 mLs Intravenous Stopped 1/12/19 2055)     Followed by   sodium chloride 0.9% infusion (1,000 mLs Intravenous New Bag 1/12/19 2102)   vancomycin (VANCOCIN) 1,750 mg in sodium chloride 0.9 % 500 mL intermittent infusion (1,750 mg Intravenous New Bag 1/12/19 2122)   ketorolac (TORADOL) injection 30 mg (not administered)   HYDROmorphone (PF) (DILAUDID) injection 1 mg (1 mg Intravenous Given 1/12/19 2002)   insulin (regular) (HumuLIN R/NovoLIN R) 1 unit/mL injection 6 Units (6 Units Intravenous Given 1/12/19 2125)       Drips running?  No    Home pump  No    Current LDAs  Peripheral IV 01/12/19 Right Upper forearm (Active)   Site Assessment WDL 1/12/2019  7:55 PM   Line Status Saline locked 1/12/2019  7:55 PM   Phlebitis Scale 0-->no symptoms 1/12/2019  7:55 PM   Dressing Intervention New dressing  1/12/2019  7:55 PM   Number of days: 0       Labs results:   Labs  Ordered and Resulted from Time of ED Arrival Up to the Time of Departure from the ED   BASIC METABOLIC PANEL - Abnormal; Notable for the following components:       Result Value    Glucose 448 (*)     Calcium 8.4 (*)     All other components within normal limits   CRP INFLAMMATION - Abnormal; Notable for the following components:    CRP Inflammation 10.0 (*)     All other components within normal limits   GLUCOSE BY METER - Abnormal; Notable for the following components:    Glucose 350 (*)     All other components within normal limits   GLUCOSE BY METER - Abnormal; Notable for the following components:    Glucose 287 (*)     All other components within normal limits   CBC WITH PLATELETS DIFFERENTIAL   INR   ERYTHROCYTE SEDIMENTATION RATE AUTO   GLUCOSE MONITOR NURSING POCT   PERIPHERAL IV CATHETER   ISTAT CG4 GASES LACTATE ALEJANDRA NURSING POCT   ISTAT  GASES LACTATE ALEJANDRA POCT   ABO/RH TYPE AND SCREEN       Imaging Studies:   Recent Results (from the past 24 hour(s))   XR Wrist Left G/E 3 Views    Narrative     Examination:  XR WRIST LEFT G/E 3 VIEWS     Date:  1/12/2019 8:44 PM      Clinical Information: trauma     Additional Information: none    Comparison: none    Findings:   The bones and joints of the left wrist are well-preserved.  No fracture or dislocation is demonstrated.      Impression    Impression:  1. No fracture demonstrated. Note scaphoid fractures can be  radiographically occult initially.    CARA AHN MD   XR Hip Left 2-3 Views    Narrative     Examination:  XR HIP LEFT 2-3 VIEWS     Date:  1/12/2019 8:45 PM      Clinical Information: trauma     Additional Information: none    Comparison: MR hip 11/30/2018    Findings:   No fracture of the bones of the left hip. No dislocation.      Impression    Impression:  1. No fracture of the left hip.    CARA AHN MD   XR Knee Left 1/2 Views    Narrative     Examination:  XR KNEE LT 1/2 VW     Date:  1/12/2019 8:47 PM      Clinical Information:  "trauma     Additional Information: none    Comparison: 6/12/2018     Findings:   Degenerative changes of the left knee. No joint effusion or fracture.      Impression    Impression:  1. Degenerative changes of left knee, no fracture demonstrated.  Unchanged from 12/31/2018.     CARA AHN MD   XR Ankle Left G/E 3 Views    Narrative    PRELIMINARY REPORT - The following report is a preliminary  interpretation.      Impression    Impression:  1. No acute osseous abnormality.  2. No substantial degenerative change.   XR Chest 2 Views    Narrative     Examination:  XR CHEST 2 VW     Date:  1/12/2019 8:48 PM      Clinical Information: trauma     Additional Information: none    Comparison: 12/24/2018    Findings:   Pulmonary vasculature is distinct. Shallow inspiration. Lungs and  pleural spaces are clear. No acute osseous injury in the chest.      Impression    Impression:  1. Clear lungs    CARA AHN MD   Elbow XR,  2 views, left    Narrative     Examination:  XR ELBOW LT 2 VW     Date:  1/12/2019 8:49 PM      Clinical Information: trauma     Additional Information: none    Comparison: none    Findings:   No fracture or dislocation of the left elbow. No joint effusion.      Impression    Impression:  1. No fracture of the bones of the left elbow.     CARA AHN MD       Recent vital signs:   /83   Pulse 93   Temp 98.1  F (36.7  C) (Oral)   Resp 16   Wt 90.2 kg (198 lb 13.7 oz)   SpO2 93%   BMI 33.09 kg/m      Cardiac Rhythm: Normal Sinus  Pt needs tele? No  Skin/wound Issues: None    Code Status: Full Code    Pain control: fair    Nausea control: pt had none    Abnormal labs/tests/findings requiring intervention:   Family present during ED course? Yes   Family Comments/Social Situation comments: Patient diabetes is not controlled. Pt. States \"448 is good for me\". Pt. May need additional teaching on glucose control and the importance.     Tasks needing completion: " None    Gisselle Amin RN    [phone] [facility]

## 2019-01-13 NOTE — PLAN OF CARE
/82 (BP Location: Left arm)   Pulse 85   Temp 97.9  F (36.6  C) (Oral)   Resp 16   Wt 90.2 kg (198 lb 13.7 oz)   SpO2 96%   BMI 33.09 kg/m        Outpatient/Observation goals to be met before discharge home:     -diagnostic tests and consults completed and resulted: No.    -vital signs normal or at patient baseline: Yes.    - improvement in cellulitis: Yes.    - pain control: Yes.    - BG control: No, new order was requested for sliding scale.

## 2019-01-13 NOTE — ED TRIAGE NOTES
Pt arrived to the ED with c/o left hip, leg arm and shoulder pain after falling on the ice. On arrival vitals stable, afebrile. Pt alert and oriented x4. Pt ambulatory to triage.
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
## 2019-01-13 NOTE — PLAN OF CARE
OT/6D: OT consult orders received, however per chart review and discussion with PT, no OT needs identified at this time. OT will sign off. Please consult again if new needs arise.

## 2019-01-13 NOTE — PLAN OF CARE
PT 6D: Will sign off. Following chart review and conversation with pt, no acute PT needs identified.

## 2019-01-13 NOTE — PROGRESS NOTES
Observation Brochure and Video    observation status based on provider's order.  Observation brochure was given and the video watched. Patient/Family stated understanding. Questions answered.  Lyle Castañeda
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
## 2019-01-13 NOTE — ED NOTES
Observation Brochure and Video    Patient informed of observation status based on provider's order.  Observation brochure was given and the video watched. Patient/Family stated understanding. Questions answered.  Gisselle Amin RN

## 2019-03-24 ENCOUNTER — HOSPITAL ENCOUNTER (EMERGENCY)
Facility: CLINIC | Age: 44
Discharge: HOME | End: 2019-03-24
Attending: EMERGENCY MEDICINE | Admitting: EMERGENCY MEDICINE
Payer: COMMERCIAL

## 2019-03-24 ENCOUNTER — APPOINTMENT (OUTPATIENT)
Dept: GENERAL RADIOLOGY | Facility: CLINIC | Age: 44
End: 2019-03-24
Attending: EMERGENCY MEDICINE
Payer: COMMERCIAL

## 2019-03-24 VITALS
OXYGEN SATURATION: 97 % | BODY MASS INDEX: 33.32 KG/M2 | TEMPERATURE: 98.2 F | DIASTOLIC BLOOD PRESSURE: 84 MMHG | WEIGHT: 200 LBS | HEIGHT: 65 IN | RESPIRATION RATE: 18 BRPM | SYSTOLIC BLOOD PRESSURE: 127 MMHG

## 2019-03-24 DIAGNOSIS — K59.00 CONSTIPATION, UNSPECIFIED CONSTIPATION TYPE: ICD-10-CM

## 2019-03-24 LAB
ALBUMIN SERPL-MCNC: 3.7 G/DL (ref 3.4–5)
ALBUMIN UR-MCNC: NEGATIVE MG/DL
ALP SERPL-CCNC: 84 U/L (ref 40–150)
ALT SERPL W P-5'-P-CCNC: 53 U/L (ref 0–70)
ANION GAP SERPL CALCULATED.3IONS-SCNC: 10 MMOL/L (ref 3–14)
APPEARANCE UR: CLEAR
AST SERPL W P-5'-P-CCNC: 21 U/L (ref 0–45)
BASOPHILS # BLD AUTO: 0.1 10E9/L (ref 0–0.2)
BASOPHILS NFR BLD AUTO: 1.2 %
BILIRUB SERPL-MCNC: 0.2 MG/DL (ref 0.2–1.3)
BILIRUB UR QL STRIP: NEGATIVE
BUN SERPL-MCNC: 24 MG/DL (ref 7–30)
CALCIUM SERPL-MCNC: 7.9 MG/DL (ref 8.5–10.1)
CHLORIDE SERPL-SCNC: 99 MMOL/L (ref 94–109)
CO2 SERPL-SCNC: 27 MMOL/L (ref 20–32)
COLOR UR AUTO: ABNORMAL
CREAT SERPL-MCNC: 1.03 MG/DL (ref 0.66–1.25)
DIFFERENTIAL METHOD BLD: NORMAL
EOSINOPHIL # BLD AUTO: 0.5 10E9/L (ref 0–0.7)
EOSINOPHIL NFR BLD AUTO: 7.1 %
ERYTHROCYTE [DISTWIDTH] IN BLOOD BY AUTOMATED COUNT: 13.8 % (ref 10–15)
GFR SERPL CREATININE-BSD FRML MDRD: 87 ML/MIN/{1.73_M2}
GLUCOSE SERPL-MCNC: 349 MG/DL (ref 70–99)
GLUCOSE UR STRIP-MCNC: >1000 MG/DL
HCT VFR BLD AUTO: 50.3 % (ref 40–53)
HGB BLD-MCNC: 16.3 G/DL (ref 13.3–17.7)
HGB UR QL STRIP: NEGATIVE
IMM GRANULOCYTES # BLD: 0 10E9/L (ref 0–0.4)
IMM GRANULOCYTES NFR BLD: 0.6 %
KETONES UR STRIP-MCNC: 10 MG/DL
LEUKOCYTE ESTERASE UR QL STRIP: NEGATIVE
LIPASE SERPL-CCNC: 108 U/L (ref 73–393)
LYMPHOCYTES # BLD AUTO: 1.6 10E9/L (ref 0.8–5.3)
LYMPHOCYTES NFR BLD AUTO: 23.7 %
MCH RBC QN AUTO: 28.1 PG (ref 26.5–33)
MCHC RBC AUTO-ENTMCNC: 32.4 G/DL (ref 31.5–36.5)
MCV RBC AUTO: 87 FL (ref 78–100)
MONOCYTES # BLD AUTO: 0.5 10E9/L (ref 0–1.3)
MONOCYTES NFR BLD AUTO: 7.9 %
NEUTROPHILS # BLD AUTO: 3.9 10E9/L (ref 1.6–8.3)
NEUTROPHILS NFR BLD AUTO: 59.5 %
NITRATE UR QL: NEGATIVE
NRBC # BLD AUTO: 0 10*3/UL
NRBC BLD AUTO-RTO: 0 /100
PH UR STRIP: 5.5 PH (ref 5–7)
PLATELET # BLD AUTO: 304 10E9/L (ref 150–450)
POTASSIUM SERPL-SCNC: 3.8 MMOL/L (ref 3.4–5.3)
PROT SERPL-MCNC: 7.2 G/DL (ref 6.8–8.8)
RBC # BLD AUTO: 5.8 10E12/L (ref 4.4–5.9)
SODIUM SERPL-SCNC: 136 MMOL/L (ref 133–144)
SOURCE: ABNORMAL
SP GR UR STRIP: 1.03 (ref 1–1.03)
UROBILINOGEN UR STRIP-MCNC: NORMAL MG/DL (ref 0–2)
WBC # BLD AUTO: 6.6 10E9/L (ref 4–11)

## 2019-03-24 PROCEDURE — 85025 COMPLETE CBC W/AUTO DIFF WBC: CPT | Performed by: EMERGENCY MEDICINE

## 2019-03-24 PROCEDURE — 93005 ELECTROCARDIOGRAM TRACING: CPT | Performed by: EMERGENCY MEDICINE

## 2019-03-24 PROCEDURE — 93010 ELECTROCARDIOGRAM REPORT: CPT | Mod: Z6 | Performed by: EMERGENCY MEDICINE

## 2019-03-24 PROCEDURE — 81003 URINALYSIS AUTO W/O SCOPE: CPT | Performed by: EMERGENCY MEDICINE

## 2019-03-24 PROCEDURE — 80053 COMPREHEN METABOLIC PANEL: CPT | Performed by: EMERGENCY MEDICINE

## 2019-03-24 PROCEDURE — 99285 EMERGENCY DEPT VISIT HI MDM: CPT | Mod: 25 | Performed by: EMERGENCY MEDICINE

## 2019-03-24 PROCEDURE — 83690 ASSAY OF LIPASE: CPT | Performed by: EMERGENCY MEDICINE

## 2019-03-24 PROCEDURE — 74019 RADEX ABDOMEN 2 VIEWS: CPT

## 2019-03-24 ASSESSMENT — ENCOUNTER SYMPTOMS
CONSTIPATION: 1
ABDOMINAL PAIN: 1
DYSURIA: 0

## 2019-03-24 ASSESSMENT — MIFFLIN-ST. JEOR: SCORE: 1719.07

## 2019-03-24 NOTE — ED AVS SNAPSHOT
[facility], [place], Emergency Department  [address] [place] [id number]  Phone:  [phone]                                    Israel Brown   MRN: [id number]    Department:  [facility], Emergency Department   Date of Visit:  3/24/2019           After Visit Summary Signature Page    I have received my discharge instructions, and my questions have been answered. I have discussed any challenges I see with this plan with the nurse or doctor.    ..........................................................................................................................................  Patient/Patient Representative Signature      ..........................................................................................................................................  Patient Representative Print Name and Relationship to Patient    ..................................................               ................................................  Date                                   Time    ..........................................................................................................................................  Reviewed by Signature/Title    ...................................................              ..............................................  Date                                               Time          22EPIC Rev 08/18

## 2019-03-25 LAB — INTERPRETATION ECG - MUSE: NORMAL

## 2019-03-25 NOTE — ED TRIAGE NOTES
Pt arrives ambulatory to triage for chest pain that he states has persisted since this morning.  Pt states the pain is in his upper left flank, under his ribs.  Pt states this pain is worse with eating.

## 2019-03-25 NOTE — ED PROVIDER NOTES
"  History     Chief Complaint   Patient presents with     Chest Pain     HPI  Israel Brown is a 43 year old male with history notable for asthma, diabetes, RA, and KEKE who presents to the ED for evaluation of abdominal pain in left upper quadrant after eating. Today, patient reports he ate a biscuit, 2 eggs, thomas, and coffee for breakfast. Just a little while after eating, he felt a constant sharp pain in his abdomen for 30-45 minutes. This abdominal pain subsided on its own, until he ate again. After eating lunch, he experienced the same constant, sharp abdominal pain in his left upper quadrant. Patient is diabetic and states his blood sugars today been relatively normal for him, in a range of approximately 140-160. However, over the past week he states his blood sugars have been \"kind of out of control\" at certain times. Additionally, patient complains of constipation with hard stools. His last bowel movement was at 1100 this morning. He denies any dysuria or change in frequency. Patient does not currently endorse abdominal pain.    Past Medical History:   Diagnosis Date     Asthma      Diabetes mellitus (H)      Neuropathy      RA (rheumatoid arthritis) (H)        Past Surgical History:   Procedure Laterality Date     REALIGN PATELLA         History reviewed. No pertinent family history.    Social History     Tobacco Use     Smoking status: Never Smoker     Smokeless tobacco: Never Used   Substance Use Topics     Alcohol use: No       No current facility-administered medications for this encounter.      Current Outpatient Medications   Medication     psyllium (METAMUCIL/KONSYL) capsule     albuterol (PROAIR HFA, PROVENTIL HFA, VENTOLIN HFA) 108 (90 BASE) MCG/ACT inhaler     fluticasone (FLONASE) 50 MCG/ACT spray     insulin aspart (NOVOLOG FLEXPEN) 100 UNIT/ML injection     insulin glargine (LANTUS) 100 UNIT/ML injection     montelukast (SINGULAIR) 10 MG tablet     omeprazole (PRILOSEC) 20 MG CR capsule " "    Pregabalin (LYRICA PO)     simvastatin (ZOCOR) 40 MG tablet        Allergies   Allergen Reactions     Cortisone      Hmg-Coa-R Inhibitors Other (See Comments)     Side effect: myalgias. Ok with zocor only     Penicillins        I have reviewed the Medications, Allergies, Past Medical and Surgical History, and Social History in the Epic system.    Review of Systems   Gastrointestinal: Positive for abdominal pain (left upper quadrant pain) and constipation (hard stools).   Genitourinary: Negative for dysuria.   All other systems reviewed and are negative.      Physical Exam   BP: (!) 151/95  Heart Rate: 87  Temp: 98.2  F (36.8  C)  Resp: 18  Height: 165.1 cm (5' 5\")  Weight: 90.7 kg (200 lb)  SpO2: 99 %      Physical Exam   Constitutional: He is oriented to person, place, and time. He appears well-developed and well-nourished. No distress.   HENT:   Head: Normocephalic and atraumatic.   Eyes: No scleral icterus.   Neck: Normal range of motion. Neck supple.   Cardiovascular: Normal rate and regular rhythm.   Pulmonary/Chest: Effort normal. No respiratory distress.   Abdominal: Soft.   Mild epigastric discomfort     Musculoskeletal: Normal range of motion.   Neurological: He is alert and oriented to person, place, and time.   Skin: Skin is warm and dry. No rash noted. He is not diaphoretic.   Vitals reviewed.      ED Course        Procedures             EKG Interpretation:      Interpreted by Fei Sheikh  Time reviewed: 1904  Symptoms at time of EKG: epigastric pain   Rhythm: normal sinus   Rate: normal  Axis: normal  Ectopy: none  Conduction: normal  ST Segments/ T Waves: No ST-T wave changes  Q Waves: none  Comparison to prior: Unchanged    Clinical Impression: normal EKG          Critical Care time:  none     Results for orders placed or performed during the hospital encounter of 03/24/19   XR Abdomen 2 Views    Narrative    Exam: XR ABDOMEN 2 VW 3/24/2019 7:38 PM    History: Abdominal " pain    Comparison: CT 6/12/2018    Findings: Upright and supine views of the abdomen. There is a paucity  of small bowel gas. At least moderate chronic stool burden, most  pronounced in the right colon and transverse colon. No portal venous  gas, pneumatosis, or free intraperitoneal gas. No acute bony  abnormality. Lung bases are relatively clear.      Impression    Impression:   1. Moderate chronic stool burden, most pronounced in the right colon  and transverse colon.  2. Nonspecific paucity of small bowel gas.    RICHARDSON MENSAH MD   CBC with platelets differential   Result Value Ref Range    WBC 6.6 4.0 - 11.0 10e9/L    RBC Count 5.80 4.4 - 5.9 10e12/L    Hemoglobin 16.3 13.3 - 17.7 g/dL    Hematocrit 50.3 40.0 - 53.0 %    MCV 87 78 - 100 fl    MCH 28.1 26.5 - 33.0 pg    MCHC 32.4 31.5 - 36.5 g/dL    RDW 13.8 10.0 - 15.0 %    Platelet Count 304 150 - 450 10e9/L    Diff Method Automated Method     % Neutrophils 59.5 %    % Lymphocytes 23.7 %    % Monocytes 7.9 %    % Eosinophils 7.1 %    % Basophils 1.2 %    % Immature Granulocytes 0.6 %    Nucleated RBCs 0 0 /100    Absolute Neutrophil 3.9 1.6 - 8.3 10e9/L    Absolute Lymphocytes 1.6 0.8 - 5.3 10e9/L    Absolute Monocytes 0.5 0.0 - 1.3 10e9/L    Absolute Eosinophils 0.5 0.0 - 0.7 10e9/L    Absolute Basophils 0.1 0.0 - 0.2 10e9/L    Abs Immature Granulocytes 0.0 0 - 0.4 10e9/L    Absolute Nucleated RBC 0.0    Comprehensive metabolic panel   Result Value Ref Range    Sodium 136 133 - 144 mmol/L    Potassium 3.8 3.4 - 5.3 mmol/L    Chloride 99 94 - 109 mmol/L    Carbon Dioxide 27 20 - 32 mmol/L    Anion Gap 10 3 - 14 mmol/L    Glucose 349 (H) 70 - 99 mg/dL    Urea Nitrogen 24 7 - 30 mg/dL    Creatinine 1.03 0.66 - 1.25 mg/dL    GFR Estimate 87 >60 mL/min/[1.73_m2]    GFR Estimate If Black >90 >60 mL/min/[1.73_m2]    Calcium 7.9 (L) 8.5 - 10.1 mg/dL    Bilirubin Total 0.2 0.2 - 1.3 mg/dL    Albumin 3.7 3.4 - 5.0 g/dL    Protein Total 7.2 6.8 - 8.8 g/dL    Alkaline  Phosphatase 84 40 - 150 U/L    ALT 53 0 - 70 U/L    AST 21 0 - 45 U/L   UA reflex to Microscopic and Culture   Result Value Ref Range    Color Urine Light Yellow     Appearance Urine Clear     Glucose Urine >1000 (A) NEG^Negative mg/dL    Bilirubin Urine Negative NEG^Negative    Ketones Urine 10 (A) NEG^Negative mg/dL    Specific Gravity Urine 1.028 1.003 - 1.035    Blood Urine Negative NEG^Negative    pH Urine 5.5 5.0 - 7.0 pH    Protein Albumin Urine Negative NEG^Negative mg/dL    Urobilinogen mg/dL Normal 0.0 - 2.0 mg/dL    Nitrite Urine Negative NEG^Negative    Leukocyte Esterase Urine Negative NEG^Negative    Source Midstream Urine    Lipase   Result Value Ref Range    Lipase 108 73 - 393 U/L   EKG 12-lead, tracing only   Result Value Ref Range    Interpretation ECG Click View Image link to view waveform and result                Labs Ordered and Resulted from Time of ED Arrival Up to the Time of Departure from the ED   COMPREHENSIVE METABOLIC PANEL - Abnormal; Notable for the following components:       Result Value    Glucose 349 (*)     Calcium 7.9 (*)     All other components within normal limits   UA MACROSCOPIC WITH REFLEX TO MICRO AND CULTURE - Abnormal; Notable for the following components:    Glucose Urine >1000 (*)     Ketones Urine 10 (*)     All other components within normal limits   CBC WITH PLATELETS DIFFERENTIAL   LIPASE            Assessments & Plan (with Medical Decision Making)   This is a 43-year-old male coming into the emergency room he is having epigastric discomfort when he eats.  He states that he will have some left-sided epigastric discomfort shortly after he eats.  He has had increasing constipation.  He does not complain of chest pain and has no shortness of breath at this time.  His physical exam is grossly unremarkable.  He has very minimal discomfort on palpation of his epigastric area.  EKG is normal sinus rhythm with no signs of ischemia.  Labs are otherwise reviewed which are  unremarkable.  We did not draw troponins because this did not appear cardiac in nature.  Abdominal x-ray shows a large amount of stool burden throughout.  I believe that he can be best suited by treating his constipation and can follow-up with his primary care physician for further care management.  Patient understands and agrees with discharge instructions and aftercare planning.    I have reviewed the nursing notes.    I have reviewed the findings, diagnosis, plan and need for follow up with the patient.       Medication List      Started    psyllium capsule  Commonly known as:  METAMUCIL/KONSYL  1 capsule, Oral, DAILY        ASK your doctor about these medications    azithromycin 250 MG tablet  Commonly known as:  ZITHROMAX Z-NIKHIL  Two tablets on the first day, then one tablet daily for the next 4 days  Ask about: Should I take this medication?     ciprofloxacin 500 MG tablet  Commonly known as:  CIPRO  500 mg, Oral, 2 TIMES DAILY  Ask about: Should I take this medication?     clindamycin 300 MG capsule  Commonly known as:  CLEOCIN  300 mg, Oral, 3 TIMES DAILY  Ask about: Should I take this medication?            Final diagnoses:   Constipation, unspecified constipation type   I, Unique Dunn, am serving as a trained medical scribe to document services personally performed by Fei Sheikh MD, based on the provider's statements to me.      I, Fei Sheikh MD, was physically present and have reviewed and verified the accuracy of this note documented by Unique Dunn.     3/24/2019   [facility], EMERGENCY DEPARTMENT     Fei Sheikh MD  03/25/19 0132

## 2019-03-25 NOTE — DISCHARGE INSTRUCTIONS
Please make an appointment to follow up with Your Primary Care Provider as soon as possible if you have any concerns.  Results for orders placed or performed during the hospital encounter of 03/24/19   XR Abdomen 2 Views    Narrative    Exam: XR ABDOMEN 2 VW 3/24/2019 7:38 PM    History: Abdominal pain    Comparison: CT 6/12/2018    Findings: Upright and supine views of the abdomen. There is a paucity  of small bowel gas. At least moderate chronic stool burden, most  pronounced in the right colon and transverse colon. No portal venous  gas, pneumatosis, or free intraperitoneal gas. No acute bony  abnormality. Lung bases are relatively clear.      Impression    Impression:   1. Moderate chronic stool burden, most pronounced in the right colon  and transverse colon.  2. Nonspecific paucity of small bowel gas.    RICHARDSON MENSAH MD   CBC with platelets differential   Result Value Ref Range    WBC 6.6 4.0 - 11.0 10e9/L    RBC Count 5.80 4.4 - 5.9 10e12/L    Hemoglobin 16.3 13.3 - 17.7 g/dL    Hematocrit 50.3 40.0 - 53.0 %    MCV 87 78 - 100 fl    MCH 28.1 26.5 - 33.0 pg    MCHC 32.4 31.5 - 36.5 g/dL    RDW 13.8 10.0 - 15.0 %    Platelet Count 304 150 - 450 10e9/L    Diff Method Automated Method     % Neutrophils 59.5 %    % Lymphocytes 23.7 %    % Monocytes 7.9 %    % Eosinophils 7.1 %    % Basophils 1.2 %    % Immature Granulocytes 0.6 %    Nucleated RBCs 0 0 /100    Absolute Neutrophil 3.9 1.6 - 8.3 10e9/L    Absolute Lymphocytes 1.6 0.8 - 5.3 10e9/L    Absolute Monocytes 0.5 0.0 - 1.3 10e9/L    Absolute Eosinophils 0.5 0.0 - 0.7 10e9/L    Absolute Basophils 0.1 0.0 - 0.2 10e9/L    Abs Immature Granulocytes 0.0 0 - 0.4 10e9/L    Absolute Nucleated RBC 0.0    Comprehensive metabolic panel   Result Value Ref Range    Sodium 136 133 - 144 mmol/L    Potassium 3.8 3.4 - 5.3 mmol/L    Chloride 99 94 - 109 mmol/L    Carbon Dioxide 27 20 - 32 mmol/L    Anion Gap 10 3 - 14 mmol/L    Glucose 349 (H) 70 - 99 mg/dL    Urea  Nitrogen 24 7 - 30 mg/dL    Creatinine 1.03 0.66 - 1.25 mg/dL    GFR Estimate 87 >60 mL/min/[1.73_m2]    GFR Estimate If Black >90 >60 mL/min/[1.73_m2]    Calcium 7.9 (L) 8.5 - 10.1 mg/dL    Bilirubin Total 0.2 0.2 - 1.3 mg/dL    Albumin 3.7 3.4 - 5.0 g/dL    Protein Total 7.2 6.8 - 8.8 g/dL    Alkaline Phosphatase 84 40 - 150 U/L    ALT 53 0 - 70 U/L    AST 21 0 - 45 U/L   UA reflex to Microscopic and Culture   Result Value Ref Range    Color Urine Light Yellow     Appearance Urine Clear     Glucose Urine >1000 (A) NEG^Negative mg/dL    Bilirubin Urine Negative NEG^Negative    Ketones Urine 10 (A) NEG^Negative mg/dL    Specific Gravity Urine 1.028 1.003 - 1.035    Blood Urine Negative NEG^Negative    pH Urine 5.5 5.0 - 7.0 pH    Protein Albumin Urine Negative NEG^Negative mg/dL    Urobilinogen mg/dL Normal 0.0 - 2.0 mg/dL    Nitrite Urine Negative NEG^Negative    Leukocyte Esterase Urine Negative NEG^Negative    Source Midstream Urine    Lipase   Result Value Ref Range    Lipase 108 73 - 393 U/L   EKG 12-lead, tracing only   Result Value Ref Range    Interpretation ECG Click View Image link to view waveform and result

## 2019-04-18 NOTE — PROGRESS NOTES
[facility]   Clinic Visit s Apr 23, 2019    PCP: Edmund Olson      Israel is a 43 year old male who is seen in consultation at the request of Dr. Olson for   Chief Complaint   Patient presents with     Left Shoulder - Pain       Injury: Reports insidious onset without acute precipitating event.     Left hand dominant    Location of Pain: left shoulder anterior and left lateral neck, radiating to wrist   Duration of Pain: 10 year(s) but worse in the last   Rating of Pain at worst: 7/10  Rating of Pain Currently: 3/10  Pain is better with: activity avoidance   Pain is worse with: sleeping, carrying (trash, groceries, laundry) and self care  Treatment so far consists of:  physical therapy in the past, shoulder injection did not provide relief  Associated symptoms: no distal numbness or tingling; denies swelling or warmth  Recent imaging completed: MRI completed April at [facility].  Prior History of related problems: slipped on ice 10 years ago and injured shoulder    Social History: is employed as a/an security    Past Medical History:   Diagnosis Date     Asthma      Diabetes mellitus (H)      Neuropathy      RA (rheumatoid arthritis) (H)        Patient Active Problem List    Diagnosis Date Noted     Cellulitis 01/12/2019     Priority: Medium     Abdominal pain 08/19/2017     Priority: Medium     Obstructive sleep apnea 10/22/2012     Priority: Medium     Problem list name updated by automated process. 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      Social History     Socioeconomic History     Marital status: Significant other     Spouse name: Not on file     Number of children: Not on file     Years of education: Not on file     Highest education level: Not on file   Occupational History     Not on file   Social Needs     Financial resource strain:  "Not on file     Food insecurity:     Worry: Not on file     Inability: Not on file     Transportation needs:     Medical: Not on file     Non-medical: Not on file   Tobacco Use     Smoking status: Never Smoker     Smokeless tobacco: Never Used   Substance and Sexual Activity     Alcohol use: No       Past Surgical History:   Procedure Laterality Date     REALIGN PATELLA             Review of Systems   Musculoskeletal: Positive for joint pain.   All other systems reviewed and are negative.        Physical Exam  /78   Ht 1.651 m (5' 5\")   Wt 90.7 kg (200 lb)   BMI 33.28 kg/m    Constitutional:well-developed, well-nourished, and in no distress.   Cardiovascular: Intact distal pulses.    Neurological: alert. Gait Normal:   Gait, station, stance, and balance appear normal for age  Skin: Skin is warm and dry.   Psychiatric: Mood and affect normal.   Respiratory: unlabored, speaks in full sentences  Lymph: no LAD, no lymphangitis            Left Shoulder Exam     Tenderness   The patient is experiencing no tenderness.     Range of Motion   Active abduction: 100   Passive abduction: 90   Extension: abnormal   External rotation: 60   Forward flexion: 90   Internal rotation 0 degrees:  Sacrum abnormal     Muscle Strength   Left shoulder normal muscle strength: Painful restricted range of motion limits strength testing but resisted internal rotation, external rotation, and abduction at 0 degrees of abduction are good.    Tests   Jeronimo test: positive  Impingement: positive  Drop arm: positive    Other   Erythema: absent  Sensation: normal  Pulse: present                X-ray images Previously done and independently reviewed by me in the office today with the patient. X-ray shows:     4 views left shoulder radiographs 11/29/2018 11:11 PM     History: left shoulder pain;       Comparison: X-ray 06/12/2018     Findings:     AP, Grashey, transscapular Y, axillary  views of the left shoulder  were obtained.      No acute " osseous abnormality.  Glenohumeral and acromioclavicular  joints are congruent.     Mild degenerative changes of the acromioclavicular joint.  No  substantial degenerative change of the glenohumeral joint.     Soft tissue is unremarkable.  The visualized lung is clear.                                                                      Impression:  No acute osseous abnormality.     I have personally reviewed the examination and initial interpretation  and I agree with the findings.     COURTNEY MAYA MD              ASSESSMENT/PLAN    ICD-10-CM    1. Diabetic frozen shoulder associated with type 2 diabetes mellitus (H) E11.618 [facility]    M75.00    2. Partial nontraumatic tear of left rotator cuff M75.112 [facility]     Most prominent finding on exam today is markedly restricted range of motion which is most consistent with frozen shoulder.  The MRI did indicate a partial rotator cuff tearing which was prominent but it is uncertain whether that is truly the trigger for shoulder pain.  Nature of frozen shoulder discussed, noted as a frustrating and elusive problem for which no specific cause is noted.  Treatment options are limited but included gentle steady persistent physical therapy as vigorous therapy makes this worse.  Judicious use of cortisone injections into the glenohumeral joint can be somewhat helpful.  Patient has had difficulty with oral steroids although the history of this is unclear, but he describes feeling sick and vomiting after steroids.  It is noted however he had oral methylprednisolone last January 2018 and he does not recall having difficulty then.  A glenohumeral ultrasound-guided cortisone injection using methylprednisolone could be an option if he desires to proceed.  He will consider his options and return for injection if desired, and in the interim we will start physical therapy for the frozen shoulder.  Following resolution of  his restricted range of motion, the shoulder will need to be reassessed with regard to the rotator cuff tear.

## 2019-04-23 ENCOUNTER — OFFICE VISIT (OUTPATIENT)
Dept: ORTHOPEDICS | Facility: CLINIC | Age: 44
End: 2019-04-23
Payer: COMMERCIAL

## 2019-04-23 VITALS
DIASTOLIC BLOOD PRESSURE: 78 MMHG | WEIGHT: 200 LBS | BODY MASS INDEX: 33.32 KG/M2 | HEIGHT: 65 IN | SYSTOLIC BLOOD PRESSURE: 110 MMHG

## 2019-04-23 DIAGNOSIS — M75.112 PARTIAL NONTRAUMATIC TEAR OF LEFT ROTATOR CUFF: ICD-10-CM

## 2019-04-23 DIAGNOSIS — E11.618 DIABETIC FROZEN SHOULDER ASSOCIATED WITH TYPE 2 DIABETES MELLITUS (H): Primary | ICD-10-CM

## 2019-04-23 DIAGNOSIS — M75.00 DIABETIC FROZEN SHOULDER ASSOCIATED WITH TYPE 2 DIABETES MELLITUS (H): Primary | ICD-10-CM

## 2019-04-23 PROCEDURE — 99203 OFFICE O/P NEW LOW 30 MIN: CPT | Performed by: FAMILY MEDICINE

## 2019-04-23 ASSESSMENT — MIFFLIN-ST. JEOR: SCORE: 1719.07

## 2019-04-23 NOTE — LETTER
4/23/2019         RE: Israel Brown  [address]  [place] [address]        Dear Colleague,    Thank you for referring your patient, Israel Brown, to the [facility]. Please see a copy of my visit note below.    HPI     [facility]   Clinic Visit s Apr 23, 2019    PCP: Edmund Olson      Israel is a 43 year old male who is seen in consultation at the request of Dr. Olson for   Chief Complaint   Patient presents with     Left Shoulder - Pain       Injury: Reports insidious onset without acute precipitating event.     Left hand dominant    Location of Pain: left shoulder anterior and left lateral neck, radiating to wrist   Duration of Pain: 10 year(s) but worse in the last   Rating of Pain at worst: 7/10  Rating of Pain Currently: 3/10  Pain is better with: activity avoidance   Pain is worse with: sleeping, carrying (trash, groceries, laundry) and self care  Treatment so far consists of:  physical therapy in the past, shoulder injection did not provide relief  Associated symptoms: no distal numbness or tingling; denies swelling or warmth  Recent imaging completed: MRI completed April at [facility].  Prior History of related problems: slipped on ice 10 years ago and injured shoulder    Social History: is employed as a/an security    Past Medical History:   Diagnosis Date     Asthma      Diabetes mellitus (H)      Neuropathy      RA (rheumatoid arthritis) (H)        Patient Active Problem List    Diagnosis Date Noted     Cellulitis 01/12/2019     Priority: Medium     Abdominal pain 08/19/2017     Priority: Medium     Obstructive sleep apnea 10/22/2012     Priority: Medium     Problem list name updated by automated process. 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      Social History  "    Socioeconomic History     Marital status: Significant other     Spouse name: Not on file     Number of children: Not on file     Years of education: Not on file     Highest education level: Not on file   Occupational History     Not on file   Social Needs     Financial resource strain: Not on file     Food insecurity:     Worry: Not on file     Inability: Not on file     Transportation needs:     Medical: Not on file     Non-medical: Not on file   Tobacco Use     Smoking status: Never Smoker     Smokeless tobacco: Never Used   Substance and Sexual Activity     Alcohol use: No       Past Surgical History:   Procedure Laterality Date     REALIGN PATELLA             Review of Systems   Musculoskeletal: Positive for joint pain.   All other systems reviewed and are negative.        Physical Exam  /78   Ht 1.651 m (5' 5\")   Wt 90.7 kg (200 lb)   BMI 33.28 kg/m     Constitutional:well-developed, well-nourished, and in no distress.   Cardiovascular: Intact distal pulses.    Neurological: alert. Gait Normal:   Gait, station, stance, and balance appear normal for age  Skin: Skin is warm and dry.   Psychiatric: Mood and affect normal.   Respiratory: unlabored, speaks in full sentences  Lymph: no LAD, no lymphangitis            Left Shoulder Exam     Tenderness   The patient is experiencing no tenderness.     Range of Motion   Active abduction: 100   Passive abduction: 90   Extension: abnormal   External rotation: 60   Forward flexion: 90   Internal rotation 0 degrees:  Sacrum abnormal     Muscle Strength   Left shoulder normal muscle strength: Painful restricted range of motion limits strength testing but resisted internal rotation, external rotation, and abduction at 0 degrees of abduction are good.    Tests   Jeronimo test: positive  Impingement: positive  Drop arm: positive    Other   Erythema: absent  Sensation: normal  Pulse: present                X-ray images Previously done and independently reviewed by me " in the office today with the patient. X-ray shows:     4 views left shoulder radiographs 11/29/2018 11:11 PM     History: left shoulder pain;       Comparison: X-ray 06/12/2018     Findings:     AP, Grashey, transscapular Y, axillary  views of the left shoulder  were obtained.      No acute osseous abnormality.  Glenohumeral and acromioclavicular  joints are congruent.     Mild degenerative changes of the acromioclavicular joint.  No  substantial degenerative change of the glenohumeral joint.     Soft tissue is unremarkable.  The visualized lung is clear.                                                                      Impression:  No acute osseous abnormality.     I have personally reviewed the examination and initial interpretation  and I agree with the findings.     COURTNEY MAYA MD              ASSESSMENT/PLAN    ICD-10-CM    1. Diabetic frozen shoulder associated with type 2 diabetes mellitus (H) E11.618 [facility]    M75.00    2. Partial nontraumatic tear of left rotator cuff M75.112 [facility]     Most prominent finding on exam today is markedly restricted range of motion which is most consistent with frozen shoulder.  The MRI did indicate a partial rotator cuff tearing which was prominent but it is uncertain whether that is truly the trigger for shoulder pain.  Nature of frozen shoulder discussed, noted as a frustrating and elusive problem for which no specific cause is noted.  Treatment options are limited but included gentle steady persistent physical therapy as vigorous therapy makes this worse.  Judicious use of cortisone injections into the glenohumeral joint can be somewhat helpful.  Patient has had difficulty with oral steroids although the history of this is unclear, but he describes feeling sick and vomiting after steroids.  It is noted however he had oral methylprednisolone last January 2018 and he does not recall having difficulty  then.  A glenohumeral ultrasound-guided cortisone injection using methylprednisolone could be an option if he desires to proceed.  He will consider his options and return for injection if desired, and in the interim we will start physical therapy for the frozen shoulder.  Following resolution of his restricted range of motion, the shoulder will need to be reassessed with regard to the rotator cuff tear.    Again, thank you for allowing me to participate in the care of your patient.        Sincerely,        Onel Garza MD

## 2019-04-23 NOTE — PATIENT INSTRUCTIONS
Patient Education     Frozen Shoulder (Adhesive Capsulitis)  Frozen shoulder is when pain and stiffness make it hard to move your shoulder normally. This condition is also called adhesive capsulitis.  The shoulder is a ball-and-socket joint. The ball on the upper arm bone fits into a socket on the shoulder blade. The joint is covered by strong connective tissue called the shoulder capsule.  If the shoulder capsule becomes inflamed and swollen, movement is painful. Bands of scar tissue form on the joint s surface. This limits your shoulder's range of motion.  In most cases, only one shoulder at a time is affected. Some people may get frozen shoulder in the other shoulder, at a later time.  Frozen shoulder develops slowly in 3 stages. Each stage can last a few months or longer:  1. Painful stage. Pain occurs when raising your arm up or to the side, or when reaching behind your back. Your range of motion decreases. The pain may be worse at night and keep you from sleeping.  2. Frozen stage. Shoulder may be less painful, but it is stiffer. Range of motion is very limited.  3. Thawing stage. Range of motion starts to improve with treatment.  Experts don t know what causes frozen shoulder. It may occur after an injury such as a fracture. Or it may happen if the shoulder is immobile for a long time, such as after surgery. It may also be an autoimmune response. Risk factors include being over age 40, or having diabetes, heart disease, lung disease, or an overactive thyroid.  The diagnosis is made by physical exam and an X-ray of the shoulder joint. Sometimes an MRI is done to look for other causes.  Mild cases may be treated with a home exercise program and anti-inflammatory medicines. More severe cases require physical therapy. In some cases a steroid is shot, or injected, into the shoulder area. In hard to treat cases, forced movement of the shoulder is done while you are asleep under general anesthesia. This will break  up scar tissue and increase your range of motion. In rare cases, surgery may be needed to remove the scar tissue. Over time, most people with frozen shoulder get back nearly all range of motion without pain. Recovery may take a few months up to 1 year. You may still feel some stiffness.  Home care    If physical therapy was prescribed, keep up with any home exercise program you were given.    Keep using the affected shoulder and arm as much as possible.    You may use over-the-counter pain medicine to control pain, unless another pain medicine was prescribed. Anti-inflammatory pain medicines may work better than acetaminophen. If you have chronic liver or kidney disease or ever had a stomach ulcer or gastrointestinal bleeding, talk with your healthcare provider before using these medicines.  Follow-up care  Follow up with your healthcare provider, or as advised. Or follow up sooner if pain increases or your shoulder motion decreases.  If X-rays or an MRI were done, you will be told of any new findings that may affect your care.  When to seek medical advice  Call your healthcare provider right away if any of these occur:    Your shoulder is red or swollen    Your arm feels weak or numb    Your shoulder movement decreases    Your shoulder pain increases even after using pain medicines   Date Last Reviewed: 5/1/2018 2000-2018 [employer]. [address] [place] [address]. All rights reserved. This information is not intended as a substitute for professional medical care. Always follow your healthcare professional's instructions.           Patient Education     Understanding Frozen Shoulder    Frozen shoulder is a condition where the shoulder becomes painful and hard to move. The condition is sometimes called adhesive capsulitis.  The shoulder is a joint that is made up of many parts. These parts allow you to raise, rotate, and swing your arm. The parts of a normal shoulder are:    Humeral  head. The ball at the top of the upper arm bone (humerus).    Scapula. The shoulder blade.    Glenoid. The shallow socket on the scapula. (The humeral head rests on the glenoid.)    Capsule. A sheet of tough tissue that encloses the joint and joins the ball to the socket.  With frozen shoulder, the capsule thickens, and shrinks and pulls in (contracts). It is not clear why this happens. It may be from swelling and irritation, or from scar tissue forming. Over time, this may result in pain, stiffness, and loss of movement in the shoulder.  What causes frozen shoulder?  Experts don t know for sure why frozen shoulder occurs. Some things can make the condition more likely. These include:    Being a woman    Being 40 to 60 years old    Having certain health conditions, such as diabetes or thyroid disease    Taking certain medicines    Not using the shoulder for a prolonged period of time, such as after an injury or surgery  Symptoms of frozen shoulder  Frozen shoulder typically occurs in 3 stages. Each stage will vary, but often lasts a few months or longer:    Freezing stage. The shoulder is very painful. Pain often gets worse when moving your arm and at night during sleep. The shoulder gradually becomes stiffer.    Frozen stage. The shoulder is very stiff and hard to move. Pain may be less than in the first stage. It may be hard to do daily tasks, such as dressing or bathing.    Thawing stage. Pain and stiffness slowly get better. In time, normal or almost normal use of the shoulder returns.  Treatment for frozen shoulder  Most cases of frozen shoulder get better, even with no treatment. Treatment is done to help speed healing and help regain as much joint movement as possible. The best course of treatment will depend on your needs. It may include one or more of the following:    Prescription or over-the-counter pain medicines. These help relieve pain and reduce swelling.    Stretching exercises. These help restore  movement to the shoulder. You may do them on your own or under the care of a physical therapist.    Cortisone shots. These are given into your shoulder joint. They can t cure frozen shoulder. But they can help give short-term relief from symptoms and allow you to do exercises without too much pain.    Cold packs and heat packs. These can help relieve symptoms.  Keep in mind that getting better is a slow process. It can take a year or longer. If your shoulder doesn t get better on its own in this time, you may need surgery. This is done to loosen the tight tissues in the shoulder joint.  When to call your healthcare provider  Call your healthcare provider right away if you have any of these:    Fever of 100.4 F (38 C) or higher, or as directed    Symptoms that don t get better, or get worse    New symptoms   Date Last Reviewed: 3/10/2016    4579-4121 [employer]. [address] [place] [address]. All rights reserved. This information is not intended as a substitute for professional medical care. Always follow your healthcare professional's instructions.           Patient Education     Treating Frozen Shoulder: Preparing for Your Exercises  Doing special exercises is the first way to treat frozen shoulder. You may see a physical therapist who can help you learn to do them. If these exercises don t help, you may need further medical treatment.  Shoulder stretches    Doing stretches is often the best way to treat frozen shoulder. Stretching each day can help lessen the pain and restore shoulder flexibility. But it often takes a significant amount of time before you notice results. Try to be patient.  To warm up, do the  pendulum.  While standing, let the hand on your frozen side dangle freely as you hold the back of a chair or a table top with your other hand. Slowly make circles and side-to-side motions with the frozen arm.  Physical therapy  Your healthcare provider may refer you to physical  therapy. This hands-on care helps you learn how to do stretching exercises at home. A physical therapist may also work on restoring your shoulder flexibility. To do this, he or she may gently stretch and move your frozen shoulder.  Tips for shoulder stretches    Anti-inflammatory medicines or steroid injections can help relieve pain. This may help you do your stretches. Your healthcare provider can tell you more.    Mild and moist heat can help loosen your shoulder. Try taking a warm shower or bath just before you stretch.    A cold or ice pack can limit pain and swelling. Try icing your shoulder for a few minutes after you do your stretches.  Date Last Reviewed: 5/1/2018 2000-2018 [employer]. [address] [place] [address], [place] [address]. All rights reserved. This information is not intended as a substitute for professional medical care. Always follow your healthcare professional's instructions.

## 2019-04-29 ENCOUNTER — THERAPY VISIT (OUTPATIENT)
Dept: PHYSICAL THERAPY | Facility: CLINIC | Age: 44
End: 2019-04-29
Payer: COMMERCIAL

## 2019-04-29 DIAGNOSIS — M75.00 DIABETIC FROZEN SHOULDER ASSOCIATED WITH TYPE 2 DIABETES MELLITUS (H): ICD-10-CM

## 2019-04-29 DIAGNOSIS — M25.512 SHOULDER PAIN, LEFT: ICD-10-CM

## 2019-04-29 DIAGNOSIS — E11.618 DIABETIC FROZEN SHOULDER ASSOCIATED WITH TYPE 2 DIABETES MELLITUS (H): ICD-10-CM

## 2019-04-29 DIAGNOSIS — M75.112 PARTIAL NONTRAUMATIC TEAR OF LEFT ROTATOR CUFF: ICD-10-CM

## 2019-04-29 PROCEDURE — 97110 THERAPEUTIC EXERCISES: CPT | Mod: GP | Performed by: PHYSICAL THERAPIST

## 2019-04-29 PROCEDURE — 97161 PT EVAL LOW COMPLEX 20 MIN: CPT | Mod: GP | Performed by: PHYSICAL THERAPIST

## 2019-04-29 NOTE — PROGRESS NOTES
[facility] Initial Evaluation  Subjective:  The history is provided by the patient. No  was used.   Israel Brown is a 43 year old male with a left shoulder condition.  Condition occurred with:  Unknown cause.  Condition occurred: for unknown reasons.  This is a new condition  4/23/2019 referral date. Pt states the pain started roughly 3 months ago. .    Patient reports pain:  Anterior.  Radiates to:  Lower arm.  Pain is described as aching and is constant and reported as 7/10.  Associated symptoms:  Loss of motion/stiffness and loss of strength.   Symptoms are exacerbated by carrying, certain positions, lifting, lying on extremity, using arm at shoulder level, using arm behind back and using arm overhead and relieved by nothing.  Since onset symptoms are unchanged.  Special tests:  MRI (see full report; partial tear of supraspinatus).      General health as reported by patient is fair.  Past medical history: asthma, diabetes, overweight.  Medical allergies: no.  Other surgeries include:  None reported.  Current medications:  None as reported by the patient.  Current occupation is .  Patient is working in normal job without restrictions.  Primary job tasks include:  Prolonged sitting and prolonged standing.    Barriers include:  None as reported by the patient.    Red flags:  None as reported by the patient.    Goals:     Pertinent Information: patient is left handed.                    Objective:  Standing Alignment:      Shoulder/UE:  Rounded shoulders                                       Shoulder Evaluation:  ROM:  AROM:    Flexion:  Left:  125    Right:  160    Abduction:  Left: 120   Right:  130    Internal Rotation:  Left:  L buttocks back pocket    Right:  L1  External Rotation:  Left:  40    Right:  60                  Pain: increased pain most with ER, then flexion, abduction, IR and supination of the elbow.    Strength:    Flexion: Left:3-/5  Strong/painful    Pain:    Right: 4-/5  Strong/pain free     Pain:     Abduction:  Left: 3-/5  Strong/painful  Pain:    Right: 4/5   Strong/painful    Pain:    Internal Rotation:  Left:3-/5   Strong/pain free    Pain:    Right: 4+/5   Strong/pain free    Pain:  External Rotation:   Left:3-/5   Strong/pain free    Pain:   Right:4+/5   Strong/pain free    Pain:                       ROM:  AROM:              Supination Elbow/Wrist:  Left: 55Right: 75                                                    General     ROS    Assessment/Plan:    Patient is a 43 year old male with left side shoulder complaints.    Patient has the following significant findings with corresponding treatment plan.                Diagnosis 1:  Diabetic Frozen Shoulder  Pain -  hot/cold therapy, manual therapy, self management, education, directional preference exercise and home program  Decreased ROM/flexibility - manual therapy and therapeutic exercise  Decreased joint mobility - manual therapy and therapeutic exercise  Decreased strength - therapeutic exercise and therapeutic activities  Impaired posture - neuro re-education    Previous and current functional limitations:  (See Goal Flow Sheet for this information)    Short term and Long term goals: (See Goal Flow Sheet for this information)     Communication ability:  Patient appears to be able to clearly communicate and understand verbal and written communication and follow directions correctly.  Treatment Explanation - The following has been discussed with the patient:   RX ordered/plan of care  Anticipated outcomes  Possible risks and side effects  This patient would benefit from PT intervention to resume normal activities.   Rehab potential is good.    Frequency:  1 X week, once daily  Duration:  for 8 weeks  Discharge Plan:  Achieve all LTG.  Independent in home treatment program.  Reach maximal therapeutic benefit.    Please refer to the daily flowsheet for treatment today, total  treatment time and time spent performing 1:1 timed codes.

## 2019-05-06 ENCOUNTER — THERAPY VISIT (OUTPATIENT)
Dept: PHYSICAL THERAPY | Facility: CLINIC | Age: 44
End: 2019-05-06
Payer: COMMERCIAL

## 2019-05-06 DIAGNOSIS — M25.512 SHOULDER PAIN, LEFT: ICD-10-CM

## 2019-05-06 PROCEDURE — 97110 THERAPEUTIC EXERCISES: CPT | Mod: GP | Performed by: PHYSICAL THERAPIST

## 2019-05-06 PROCEDURE — 97140 MANUAL THERAPY 1/> REGIONS: CPT | Mod: GP | Performed by: PHYSICAL THERAPIST

## 2019-05-20 ENCOUNTER — THERAPY VISIT (OUTPATIENT)
Dept: PHYSICAL THERAPY | Facility: CLINIC | Age: 44
End: 2019-05-20
Payer: COMMERCIAL

## 2019-05-20 DIAGNOSIS — M25.512 SHOULDER PAIN, LEFT: ICD-10-CM

## 2019-05-20 PROCEDURE — 97140 MANUAL THERAPY 1/> REGIONS: CPT | Mod: GP | Performed by: PHYSICAL THERAPIST

## 2019-05-20 PROCEDURE — 97110 THERAPEUTIC EXERCISES: CPT | Mod: GP | Performed by: PHYSICAL THERAPIST

## 2019-06-03 ENCOUNTER — THERAPY VISIT (OUTPATIENT)
Dept: PHYSICAL THERAPY | Facility: CLINIC | Age: 44
End: 2019-06-03
Payer: COMMERCIAL

## 2019-06-03 DIAGNOSIS — M25.512 SHOULDER PAIN, LEFT: ICD-10-CM

## 2019-06-03 PROCEDURE — 97110 THERAPEUTIC EXERCISES: CPT | Mod: GP | Performed by: PHYSICAL THERAPIST

## 2019-06-03 PROCEDURE — 97140 MANUAL THERAPY 1/> REGIONS: CPT | Mod: GP | Performed by: PHYSICAL THERAPIST

## 2019-06-10 ENCOUNTER — THERAPY VISIT (OUTPATIENT)
Dept: PHYSICAL THERAPY | Facility: CLINIC | Age: 44
End: 2019-06-10
Payer: COMMERCIAL

## 2019-06-10 DIAGNOSIS — M25.512 SHOULDER PAIN, LEFT: ICD-10-CM

## 2019-06-10 PROCEDURE — 97140 MANUAL THERAPY 1/> REGIONS: CPT | Mod: GP | Performed by: PHYSICAL THERAPIST

## 2019-06-10 PROCEDURE — 97110 THERAPEUTIC EXERCISES: CPT | Mod: GP | Performed by: PHYSICAL THERAPIST

## 2019-08-28 ENCOUNTER — TRANSFERRED RECORDS (OUTPATIENT)
Dept: HEALTH INFORMATION MANAGEMENT | Facility: CLINIC | Age: 44
End: 2019-08-28

## 2019-09-11 ENCOUNTER — THERAPY VISIT (OUTPATIENT)
Dept: PHYSICAL THERAPY | Facility: CLINIC | Age: 44
End: 2019-09-11
Payer: COMMERCIAL

## 2019-09-11 DIAGNOSIS — M25.512 SHOULDER PAIN, LEFT: ICD-10-CM

## 2019-09-11 PROCEDURE — 97164 PT RE-EVAL EST PLAN CARE: CPT | Mod: GP | Performed by: PHYSICAL THERAPIST

## 2019-09-11 PROCEDURE — 97140 MANUAL THERAPY 1/> REGIONS: CPT | Mod: GP | Performed by: PHYSICAL THERAPIST

## 2019-09-11 PROCEDURE — 97110 THERAPEUTIC EXERCISES: CPT | Mod: GP | Performed by: PHYSICAL THERAPIST

## 2019-09-11 NOTE — PROGRESS NOTES
Subjective:  HPI                    Objective:  System    Physical Exam    General     ROS    Assessment/Plan:    PROGRESS  REPORT    Progress reporting period is from 4/29/2019 to 9/11/2019.  Patient has been seen for 5 visits total.       SUBJECTIVE  Patient resuming physical therapy following an interruption in treatment related to work schedule.  Patient reports continued L shoulder pain.  Pain is located in the shoulder joint and is localized to the shoulder.  Pain is generally worst first thing in the morning.  Pain and function vary day to day.  Today is a good day and patient currently has no pain a rest.  Other days the shoulder is very painful and patient has difficulty reaching.    Current Pain level: 0/10(at rest).     Initial Pain level: 3/10.   Changes in function:  Yes (See Goal flowsheet attached for changes in current functional level)  Adverse reaction to treatment or activity: None    OBJECTIVE  Objective: AROM: L shoulder-140 degrees flexion, 60 degrees extension, 120 degrees abduction, 50 degrees ER, and extension/IR to L PSIS.  4/10 at end ranges of motion.  PROM: L shoulder-140 degrees flexion, 120 degrees abduction, 52 degrees IR, 25 degrees ER.     ASSESSMENT/PLAN  Updated problem list and treatment plan: Diagnosis 1:  L shoulder pain  Pain -  manual therapy and home program  Decreased ROM/flexibility - manual therapy, therapeutic exercise and home program  Decreased joint mobility - manual therapy, therapeutic exercise and home program  Decreased function - therapeutic activities and home program  STG/LTGs have been met or progress has been made towards goals:  Yes (See Goal flow sheet completed today.)  Assessment of Progress: Patient's condition has improved overall.  Progress has been limited by an interruption in treatment.  Self Management Plans:  Patient has been instructed in a home treatment program.  I have re-evaluated this patient and find that the nature, scope, duration and  intensity of the therapy is appropriate for the medical condition of the patient.  Israel continues to require the following intervention to meet STG and LTG's:  PT    Recommendations:  This patient would benefit from continued therapy.   Patient resuming physical therapy after an interruption in treatment.  Frequency:  1 X week, once daily  Duration:  for 6 weeks        Please refer to the daily flowsheet for treatment today, total treatment time and time spent performing 1:1 timed codes.

## 2019-09-19 ENCOUNTER — THERAPY VISIT (OUTPATIENT)
Dept: PHYSICAL THERAPY | Facility: CLINIC | Age: 44
End: 2019-09-19
Payer: COMMERCIAL

## 2019-09-19 DIAGNOSIS — M25.512 SHOULDER PAIN, LEFT: ICD-10-CM

## 2019-09-19 PROCEDURE — 97140 MANUAL THERAPY 1/> REGIONS: CPT | Mod: GP | Performed by: PHYSICAL THERAPIST

## 2019-09-19 PROCEDURE — 97110 THERAPEUTIC EXERCISES: CPT | Mod: GP | Performed by: PHYSICAL THERAPIST

## 2019-09-26 ENCOUNTER — THERAPY VISIT (OUTPATIENT)
Dept: PHYSICAL THERAPY | Facility: CLINIC | Age: 44
End: 2019-09-26
Payer: COMMERCIAL

## 2019-09-26 DIAGNOSIS — R06.00 DYSPNEA: Primary | ICD-10-CM

## 2019-09-26 DIAGNOSIS — M25.512 SHOULDER PAIN, LEFT: ICD-10-CM

## 2019-09-26 PROCEDURE — 97140 MANUAL THERAPY 1/> REGIONS: CPT | Mod: GP | Performed by: PHYSICAL THERAPIST

## 2019-09-26 PROCEDURE — 97110 THERAPEUTIC EXERCISES: CPT | Mod: GP | Performed by: PHYSICAL THERAPIST

## 2019-09-29 NOTE — TELEPHONE ENCOUNTER
RECORDS RECEIVED FROM: Internal, External   DATE RECEIVED: 12.12.19   NOTES STATUS DETAILS   OFFICE NOTE from referring provider Care Everywhere 9.9.19 Dr. Olson[place] [facility] [place].28.19 [facility]   OFFICE NOTE from other specialist N/A    DISCHARGE SUMMARY from hospital N/A    DISCHARGE REPORT from the ER N/A    MEDICATION LIST Care Everywhere    IMAGING  (NEED IMAGES AND REPORTS)     CT SCAN N/A    CHEST XRAY (CXR) Internal 1.12.19 12.24.18  10.8.18  6.12.18  4.23.18 More images in EPIC   TESTS     PULMONARY FUNCTION TESTING (PFT) Received 8.28.19      Action    Action Taken Requested the following from [facility] [place]  [place] chest 9.9.19  cxr 8.28.19, 4.27.18  PFT 8.28.19    -sent pft to [facility]

## 2019-10-03 ENCOUNTER — THERAPY VISIT (OUTPATIENT)
Dept: PHYSICAL THERAPY | Facility: CLINIC | Age: 44
End: 2019-10-03
Payer: COMMERCIAL

## 2019-10-03 DIAGNOSIS — M25.512 SHOULDER PAIN, LEFT: ICD-10-CM

## 2019-10-03 PROCEDURE — 97140 MANUAL THERAPY 1/> REGIONS: CPT | Mod: GP | Performed by: PHYSICAL THERAPIST

## 2019-10-03 PROCEDURE — 97110 THERAPEUTIC EXERCISES: CPT | Mod: GP | Performed by: PHYSICAL THERAPIST

## 2019-10-10 ENCOUNTER — TELEPHONE (OUTPATIENT)
Dept: PULMONOLOGY | Facility: CLINIC | Age: 44
End: 2019-10-10

## 2019-10-10 ENCOUNTER — THERAPY VISIT (OUTPATIENT)
Dept: PHYSICAL THERAPY | Facility: CLINIC | Age: 44
End: 2019-10-10
Payer: COMMERCIAL

## 2019-10-10 DIAGNOSIS — M25.512 SHOULDER PAIN, LEFT: ICD-10-CM

## 2019-10-10 PROCEDURE — 97110 THERAPEUTIC EXERCISES: CPT | Mod: GP | Performed by: PHYSICAL THERAPIST

## 2019-10-10 PROCEDURE — 97140 MANUAL THERAPY 1/> REGIONS: CPT | Mod: GP | Performed by: PHYSICAL THERAPIST

## 2019-10-10 NOTE — TELEPHONE ENCOUNTER
[facility] Call Center    Phone Message    May a detailed message be left on voicemail: yes    Reason for Call: Other: Dominga from [facility] is calling requesting a sooner appointment for Israel, she is requesting the care team reach out to her to discuss.     Action Taken: Message routed to:  [facility] ([facility] Pulm

## 2019-10-14 NOTE — TELEPHONE ENCOUNTER
Unable to get a hold of pt.  Called Dominga at [facility], she states she was contacted and given consult number so that her provider could call to discuss and expedite if appropriate.

## 2019-10-15 ENCOUNTER — TELEPHONE (OUTPATIENT)
Dept: PULMONOLOGY | Facility: CLINIC | Age: 44
End: 2019-10-15

## 2019-10-15 NOTE — TELEPHONE ENCOUNTER
Contacted by Dr. Vnan to place pt in general pulmonary slot before the end of the month.  Pt currently scheduled with Dr. Morgan 12/12.  Message to scheduling to move appointment to Dr. Wilson on 10/23.

## 2019-10-15 NOTE — TELEPHONE ENCOUNTER
Pedro Pope,    I rescheduled this pt with Dr. Wilson on 10/23 and put Cathy's 12/12 appt on hold. I left voicemail informing pt of new date and times, provided my number to call back with any questions.    Arlet

## 2019-10-23 ENCOUNTER — OFFICE VISIT (OUTPATIENT)
Dept: PULMONOLOGY | Facility: CLINIC | Age: 44
End: 2019-10-23
Payer: COMMERCIAL

## 2019-10-23 VITALS
SYSTOLIC BLOOD PRESSURE: 119 MMHG | HEART RATE: 92 BPM | RESPIRATION RATE: 18 BRPM | HEIGHT: 65 IN | OXYGEN SATURATION: 92 % | WEIGHT: 200 LBS | BODY MASS INDEX: 33.32 KG/M2 | DIASTOLIC BLOOD PRESSURE: 86 MMHG

## 2019-10-23 DIAGNOSIS — R06.00 DYSPNEA, UNSPECIFIED TYPE: Primary | ICD-10-CM

## 2019-10-23 DIAGNOSIS — R06.00 DYSPNEA: Primary | ICD-10-CM

## 2019-10-23 DIAGNOSIS — R06.00 DYSPNEA: ICD-10-CM

## 2019-10-23 RX ORDER — LEVOTHYROXINE SODIUM 50 UG/1
50 TABLET ORAL
COMMUNITY
Start: 2019-09-06

## 2019-10-23 RX ORDER — DAPAGLIFLOZIN 10 MG/1
10 TABLET, FILM COATED ORAL
COMMUNITY
Start: 2019-08-29 | End: 2024-04-04 | Stop reason: ALTCHOICE

## 2019-10-23 RX ORDER — METHYLPREDNISOLONE 4 MG
TABLET, DOSE PACK ORAL
COMMUNITY
Start: 2019-10-09 | End: 2024-04-17

## 2019-10-23 RX ORDER — AZITHROMYCIN 250 MG/1
TABLET, FILM COATED ORAL
COMMUNITY
Start: 2019-10-09 | End: 2019-10-23

## 2019-10-23 RX ORDER — LISINOPRIL 5 MG/1
5 TABLET ORAL
COMMUNITY
Start: 2019-06-05

## 2019-10-23 RX ORDER — IPRATROPIUM BROMIDE AND ALBUTEROL SULFATE 2.5; .5 MG/3ML; MG/3ML
3 SOLUTION RESPIRATORY (INHALATION)
COMMUNITY
Start: 2019-08-28 | End: 2024-04-17

## 2019-10-23 RX ORDER — METHOCARBAMOL 500 MG/1
500 TABLET, FILM COATED ORAL
COMMUNITY
Start: 2019-08-29

## 2019-10-23 ASSESSMENT — MIFFLIN-ST. JEOR: SCORE: 1719.07

## 2019-10-23 ASSESSMENT — PAIN SCALES - GENERAL: PAINLEVEL: NO PAIN (0)

## 2019-10-23 NOTE — NURSING NOTE
Chief Complaint   Patient presents with     Consult     Shortness of Breath      Daniela Louis CMA

## 2019-10-23 NOTE — LETTER
"10/23/2019       RE: Israel rBown  [address]  [place] [address]     Dear Colleague,    Thank you for referring your patient, Israel Brown, to the [facility]G [facility]ENCE AND [facility] at [facility]. Please see a copy of my visit note below.    Pulmonary Clinic Initial Visit Note    CC: \"Cough\"      HPI:     43M with PMHx of Asthma, KEKE, GERD who is presenting to the clinic for evaluation for Cough & Dyspnea. [place] [facility] [place] to [place] CT (changes consistent with Bronchiectasis) he was given course of Prednisone and Zpack for 2 week course. His Spirometry from outside facility  Showed FEV1/FVC 89%, FEV1 66%, FVC 58%.  Patient reports that he developed cough back in January. He describes the cough mostly dry, at times productive with clear sputum. He denies any triggers, aggravating or reliving factors. He does have GERD and has been on PPI with some relive. He also  reports having dyspnea from time to time. Dyspnea occurs randomly, and is not associated with exertion. He denies any recent fevers, chills, arthralgias or myalgias.     PMH:  Past Medical History:   Diagnosis Date     Asthma      Diabetes mellitus (H)      Neuropathy      RA (rheumatoid arthritis) (H)        Allergies:  Allergies   Allergen Reactions     Cortisone      Hmg-Coa-R Inhibitors Other (See Comments)     Side effect: myalgias. Ok with zocor only     Penicillins        Social History:  Social History     Socioeconomic History     Marital status:      Spouse name: Not on file     Number of children: Not on file     Years of education: Not on file     Highest education level: Not on file   Occupational History     Not on file   Social Needs     Financial resource strain: Not on file     Food insecurity:     Worry: Not on file     Inability: Not on file     Transportation needs:     Medical: Not on file     Non-medical: Not on " file   Tobacco Use     Smoking status: Never Smoker     Smokeless tobacco: Never Used   Substance and Sexual Activity     Alcohol use: No     Drug use: No     Sexual activity: Not on file   Lifestyle     Physical activity:     Days per week: Not on file     Minutes per session: Not on file     Stress: Not on file   Relationships     Social connections:     Talks on phone: Not on file     Gets together: Not on file     Attends Holiness service: Not on file     Active member of club or organization: Not on file     Attends meetings of clubs or organizations: Not on file     Relationship status: Not on file     Intimate partner violence:     Fear of current or ex partner: Not on file     Emotionally abused: Not on file     Physically abused: Not on file     Forced sexual activity: Not on file   Other Topics Concern     Not on file   Social History Narrative     Not on file       Medications:  Current Outpatient Medications   Medication Sig Dispense Refill     albuterol (PROAIR HFA, PROVENTIL HFA, VENTOLIN HFA) 108 (90 BASE) MCG/ACT inhaler Inhale 2 puffs into the lungs every 6 hours       fluticasone (FLONASE) 50 MCG/ACT spray Spray 1 spray in nostril daily       insulin aspart (NOVOLOG FLEXPEN) 100 UNIT/ML injection Inject Subcutaneous 3 times daily (with meals)       insulin glargine (LANTUS) 100 UNIT/ML injection Inject 60 Units Subcutaneous every morning       montelukast (SINGULAIR) 10 MG tablet Take 10 mg by mouth At Bedtime       omeprazole (PRILOSEC) 20 MG CR capsule Take 20 mg by mouth daily       Pregabalin (LYRICA PO)        psyllium (METAMUCIL/KONSYL) capsule Take 1 capsule by mouth daily (Patient not taking: Reported on 4/23/2019) 90 capsule 3     simvastatin (ZOCOR) 40 MG tablet Take 40 mg by mouth At Bedtime         Family History:  No family history on file.    ROS: Complete 10 point ROS negative unless mentioned in HPI    Physical Exam:  There were no vitals taken for this visit.    General: Sitting  in the chair in NAD  HEENT: anicteric, moist mucosa  Neck: no palpable lymphadenopathy, no JVD noted  Chest: CTAB, no wheezing  Cardiac: RRR no murmurs  Abdomen: Soft, flat, non tender, active BS  Extremities: No LE Edema  Neuro: A&Ox3, no focal defecits  Skin: no rash noted        Labs and Radiology:  CT-CHEST 2016   IMPRESSION:  Bilateral lower lobe bronchial wall thickening which can be seen in bronchitis. No focal airspace disease.    PFT's:  PFT Latest Ref Rng & Units 10/23/2019   FVC L 2.83   FEV1 L 2.31   FVC% % 65   FEV1% % 66           Assessment and Plan:  43M with PMHx of Asthma, KEKE, GERD who is presenting with chronic cough and dyspnea with non-specific pattern on spirometer. He carries diagnosis of asthma since childhood, however has not had any recent exacerbations. DDx for chronic cough includes asthma vs GERD. His has had cough since January, had tried Albuterol, ICS with no benefit. He also has GERD, has been taking PPI. Would suggest sleep with head elevation, and avoiding meals close to bedtime. He denies any allergies or post nasal drip.   Regarding his intermittent dyspnea, unclear etiology. Office walk test showed no significant desaturation or hypoxia. He does have low FEV1, FVC with normal ratio and TLC. Could have early developing interstial lung disease, although recent CT-chest was not very impressive. Will continue to monitor and repeat PFT's on his next visit. DDx: neuromuscular diease, unlikely but will check MIP/MEP with his next PFT's.     # Cough   # Asthma   # GERD     Plan   - Continue JESUS prn   - Continue PPI, life style modification for GERD   - Follow up with Pulmonary 3 months with PFT's     Seen and staffed with Dr. Rebolledo.  Steve Cassidy MD  Pulmonary and Critical Care Fellow    Pulmonary Staff:  I have discussed Mr. Brown 's case with Dr. Wilson; reviewed the patient's available imaging and PFT data and met with him.  I agree with the findings, assessment and  recommendations as outlined above by Dr. Wilson.    Consider obtaining Bugle pressures (MIP/MEP).    Juanita Rebolledo MD  #[id number]      Again, thank you for allowing me to participate in the care of your patient.      Sincerely,    Khanh Wilson MD

## 2019-10-23 NOTE — PATIENT INSTRUCTIONS
Continue taking your Omeprazole   Try sleeping with your head elevated to 30 degree   Avoid eating large meal close to bedtime   Pulmonary follow up in 3 months

## 2019-10-23 NOTE — PROGRESS NOTES
"Pulmonary Clinic Initial Visit Note    CC: \"Cough\"      HPI:     43M with PMHx of Asthma, KEKE, GERD who is presenting to the clinic for evaluation for Cough & Dyspnea. Patient was recently seen at [facility] [place] he was noted to have abnormal CT (changes consistent with Bronchiectasis) he was given course of Prednisone and Zpack for 2 week course. His Spirometry from outside facility  Showed FEV1/FVC 89%, FEV1 66%, FVC 58%.  Patient reports that he developed cough back in January. He describes the cough mostly dry, at times productive with clear sputum. He denies any triggers, aggravating or reliving factors. He does have GERD and has been on PPI with some relive. He also  reports having dyspnea from time to time. Dyspnea occurs randomly, and is not associated with exertion. He denies any recent fevers, chills, arthralgias or myalgias.     PMH:  Past Medical History:   Diagnosis Date     Asthma      Diabetes mellitus (H)      Neuropathy      RA (rheumatoid arthritis) (H)        Allergies:  Allergies   Allergen Reactions     Cortisone      Hmg-Coa-R Inhibitors Other (See Comments)     Side effect: myalgias. Ok with zocor only     Penicillins        Social History:  Social History     Socioeconomic History     Marital status:      Spouse name: Not on file     Number of children: Not on file     Years of education: Not on file     Highest education level: Not on file   Occupational History     Not on file   Social Needs     Financial resource strain: Not on file     Food insecurity:     Worry: Not on file     Inability: Not on file     Transportation needs:     Medical: Not on file     Non-medical: Not on file   Tobacco Use     Smoking status: Never Smoker     Smokeless tobacco: Never Used   Substance and Sexual Activity     Alcohol use: No     Drug use: No     Sexual activity: Not on file   Lifestyle     Physical activity:     Days per week: Not on file     Minutes per session: Not on file     Stress: Not on file "   Relationships     Social connections:     Talks on phone: Not on file     Gets together: Not on file     Attends Holiness service: Not on file     Active member of club or organization: Not on file     Attends meetings of clubs or organizations: Not on file     Relationship status: Not on file     Intimate partner violence:     Fear of current or ex partner: Not on file     Emotionally abused: Not on file     Physically abused: Not on file     Forced sexual activity: Not on file   Other Topics Concern     Not on file   Social History Narrative     Not on file       Medications:  Current Outpatient Medications   Medication Sig Dispense Refill     albuterol (PROAIR HFA, PROVENTIL HFA, VENTOLIN HFA) 108 (90 BASE) MCG/ACT inhaler Inhale 2 puffs into the lungs every 6 hours       fluticasone (FLONASE) 50 MCG/ACT spray Spray 1 spray in nostril daily       insulin aspart (NOVOLOG FLEXPEN) 100 UNIT/ML injection Inject Subcutaneous 3 times daily (with meals)       insulin glargine (LANTUS) 100 UNIT/ML injection Inject 60 Units Subcutaneous every morning       montelukast (SINGULAIR) 10 MG tablet Take 10 mg by mouth At Bedtime       omeprazole (PRILOSEC) 20 MG CR capsule Take 20 mg by mouth daily       Pregabalin (LYRICA PO)        psyllium (METAMUCIL/KONSYL) capsule Take 1 capsule by mouth daily (Patient not taking: Reported on 4/23/2019) 90 capsule 3     simvastatin (ZOCOR) 40 MG tablet Take 40 mg by mouth At Bedtime         Family History:  No family history on file.    ROS: Complete 10 point ROS negative unless mentioned in HPI    Physical Exam:  There were no vitals taken for this visit.    General: Sitting in the chair in NAD  HEENT: anicteric, moist mucosa  Neck: no palpable lymphadenopathy, no JVD noted  Chest: CTAB, no wheezing  Cardiac: RRR no murmurs  Abdomen: Soft, flat, non tender, active BS  Extremities: No LE Edema  Neuro: A&Ox3, no focal defecits  Skin: no rash noted        Labs and Radiology:  CT-CHEST  2016   IMPRESSION:  Bilateral lower lobe bronchial wall thickening which can be seen in bronchitis. No focal airspace disease.    PFT's:  PFT Latest Ref Rng & Units 10/23/2019   FVC L 2.83   FEV1 L 2.31   FVC% % 65   FEV1% % 66           Assessment and Plan:  43M with PMHx of Asthma, KEEK, GERD who is presenting with chronic cough and dyspnea with non-specific pattern on spirometer. He carries diagnosis of asthma since childhood, however has not had any recent exacerbations. DDx for chronic cough includes asthma vs GERD. His has had cough since January, had tried Albuterol, ICS with no benefit. He also has GERD, has been taking PPI. Would suggest sleep with head elevation, and avoiding meals close to bedtime. He denies any allergies or post nasal drip.   Regarding his intermittent dyspnea, unclear etiology. Office walk test showed no significant desaturation or hypoxia. He does have low FEV1, FVC with normal ratio and TLC. Could have early developing interstial lung disease, although recent CT-chest was not very impressive. Will continue to monitor and repeat PFT's on his next visit. DDx: neuromuscular diease, unlikely but will check MIP/MEP with his next PFT's.     # Cough   # Asthma   # GERD     Plan   - Continue JESUS prn   - Continue PPI, life style modification for GERD   - Follow up with Pulmonary 3 months with PFT's     Seen and staffed with Dr. Rebolledo.  Steve Cassidy MD  Pulmonary and Critical Care Fellow    Pulmonary Staff:  I have discussed Mr. Brown 's case with Dr. Wilson; reviewed the patient's available imaging and PFT data and met with him.  I agree with the findings, assessment and recommendations as outlined above by Dr. Wilson.    Consider obtaining Bugle pressures (MIP/MEP).    Juanita Rebolledo MD  #[id number]

## 2019-10-27 LAB
DLCOUNC-%PRED-PRE: 109 %
DLCOUNC-PRE: 28.28 ML/MIN/MMHG
DLCOUNC-PRED: 25.76 ML/MIN/MMHG
ERV-%PRED-PRE: 45 %
ERV-PRE: 0.37 L
ERV-PRED: 0.83 L
EXPTIME-PRE: 5.06 SEC
FEF2575-%PRED-PRE: 66 %
FEF2575-PRE: 2.28 L/SEC
FEF2575-PRED: 3.4 L/SEC
FEFMAX-%PRED-PRE: 70 %
FEFMAX-PRE: 6.28 L/SEC
FEFMAX-PRED: 8.94 L/SEC
FEV1-%PRED-PRE: 66 %
FEV1-PRE: 2.31 L
FEV1FEV6-PRE: 82 %
FEV1FEV6-PRED: 81 %
FEV1FVC-PRE: 82 %
FEV1FVC-PRED: 81 %
FEV1SVC-PRE: 80 %
FEV1SVC-PRED: 78 %
FIFMAX-PRE: 3.47 L/SEC
FRCPLETH-%PRED-PRE: 89 %
FRCPLETH-PRE: 2.86 L
FRCPLETH-PRED: 3.18 L
FVC-%PRED-PRE: 65 %
FVC-PRE: 2.83 L
FVC-PRED: 4.29 L
IC-%PRED-PRE: 69 %
IC-PRE: 2.53 L
IC-PRED: 3.63 L
RVPLETH-%PRED-PRE: 129 %
RVPLETH-PRE: 2.48 L
RVPLETH-PRED: 1.92 L
TLCPLETH-%PRED-PRE: 88 %
TLCPLETH-PRE: 5.39 L
TLCPLETH-PRED: 6.11 L
VA-%PRED-PRE: 79 %
VA-PRE: 4.41 L
VC-%PRED-PRE: 65 %
VC-PRE: 2.9 L
VC-PRED: 4.46 L

## 2019-11-07 ENCOUNTER — APPOINTMENT (OUTPATIENT)
Dept: CT IMAGING | Facility: CLINIC | Age: 44
End: 2019-11-07
Attending: EMERGENCY MEDICINE
Payer: COMMERCIAL

## 2019-11-07 ENCOUNTER — HOSPITAL ENCOUNTER (EMERGENCY)
Facility: CLINIC | Age: 44
Discharge: HOME | End: 2019-11-07
Attending: EMERGENCY MEDICINE | Admitting: EMERGENCY MEDICINE
Payer: COMMERCIAL

## 2019-11-07 VITALS
TEMPERATURE: 97.8 F | BODY MASS INDEX: 33.87 KG/M2 | DIASTOLIC BLOOD PRESSURE: 76 MMHG | WEIGHT: 203.26 LBS | OXYGEN SATURATION: 99 % | HEART RATE: 76 BPM | SYSTOLIC BLOOD PRESSURE: 129 MMHG | HEIGHT: 65 IN

## 2019-11-07 DIAGNOSIS — R10.84 GENERALIZED ABDOMINAL PAIN: ICD-10-CM

## 2019-11-07 LAB
ALBUMIN SERPL-MCNC: 3.7 G/DL (ref 3.4–5)
ALBUMIN UR-MCNC: NEGATIVE MG/DL
ALP SERPL-CCNC: 80 U/L (ref 40–150)
ALT SERPL W P-5'-P-CCNC: 43 U/L (ref 0–70)
ANION GAP SERPL CALCULATED.3IONS-SCNC: 6 MMOL/L (ref 3–14)
APPEARANCE UR: CLEAR
AST SERPL W P-5'-P-CCNC: 10 U/L (ref 0–45)
BASOPHILS # BLD AUTO: 0.1 10E9/L (ref 0–0.2)
BASOPHILS NFR BLD AUTO: 1.3 %
BILIRUB SERPL-MCNC: 0.3 MG/DL (ref 0.2–1.3)
BILIRUB UR QL STRIP: NEGATIVE
BUN SERPL-MCNC: 18 MG/DL (ref 7–30)
CALCIUM SERPL-MCNC: 8.9 MG/DL (ref 8.5–10.1)
CHLORIDE SERPL-SCNC: 102 MMOL/L (ref 94–109)
CO2 SERPL-SCNC: 28 MMOL/L (ref 20–32)
COLOR UR AUTO: ABNORMAL
CREAT SERPL-MCNC: 1.16 MG/DL (ref 0.66–1.25)
DIFFERENTIAL METHOD BLD: ABNORMAL
EOSINOPHIL # BLD AUTO: 0.4 10E9/L (ref 0–0.7)
EOSINOPHIL NFR BLD AUTO: 5.6 %
ERYTHROCYTE [DISTWIDTH] IN BLOOD BY AUTOMATED COUNT: 14.3 % (ref 10–15)
GFR SERPL CREATININE-BSD FRML MDRD: 75 ML/MIN/{1.73_M2}
GLUCOSE BLDC GLUCOMTR-MCNC: 215 MG/DL (ref 70–99)
GLUCOSE SERPL-MCNC: 320 MG/DL (ref 70–99)
GLUCOSE UR STRIP-MCNC: >1000 MG/DL
HCT VFR BLD AUTO: 51.1 % (ref 40–53)
HGB BLD-MCNC: 16.6 G/DL (ref 13.3–17.7)
HGB UR QL STRIP: NEGATIVE
IMM GRANULOCYTES # BLD: 0 10E9/L (ref 0–0.4)
IMM GRANULOCYTES NFR BLD: 0.3 %
KETONES UR STRIP-MCNC: NEGATIVE MG/DL
LEUKOCYTE ESTERASE UR QL STRIP: NEGATIVE
LYMPHOCYTES # BLD AUTO: 1.5 10E9/L (ref 0.8–5.3)
LYMPHOCYTES NFR BLD AUTO: 21.9 %
MCH RBC QN AUTO: 27.5 PG (ref 26.5–33)
MCHC RBC AUTO-ENTMCNC: 32.5 G/DL (ref 31.5–36.5)
MCV RBC AUTO: 85 FL (ref 78–100)
MONOCYTES # BLD AUTO: 0.5 10E9/L (ref 0–1.3)
MONOCYTES NFR BLD AUTO: 6.9 %
NEUTROPHILS # BLD AUTO: 4.4 10E9/L (ref 1.6–8.3)
NEUTROPHILS NFR BLD AUTO: 64 %
NITRATE UR QL: NEGATIVE
NRBC # BLD AUTO: 0 10*3/UL
NRBC BLD AUTO-RTO: 0 /100
PH UR STRIP: 5 PH (ref 5–7)
PLATELET # BLD AUTO: 315 10E9/L (ref 150–450)
POTASSIUM SERPL-SCNC: 4.2 MMOL/L (ref 3.4–5.3)
PROT SERPL-MCNC: 7.2 G/DL (ref 6.8–8.8)
RBC # BLD AUTO: 6.04 10E12/L (ref 4.4–5.9)
RBC #/AREA URNS AUTO: <1 /HPF (ref 0–2)
SODIUM SERPL-SCNC: 136 MMOL/L (ref 133–144)
SOURCE: ABNORMAL
SP GR UR STRIP: >1.035 (ref 1–1.03)
UROBILINOGEN UR STRIP-MCNC: NORMAL MG/DL (ref 0–2)
WBC # BLD AUTO: 6.8 10E9/L (ref 4–11)
WBC #/AREA URNS AUTO: 1 /HPF (ref 0–5)

## 2019-11-07 PROCEDURE — 85025 COMPLETE CBC W/AUTO DIFF WBC: CPT | Performed by: EMERGENCY MEDICINE

## 2019-11-07 PROCEDURE — 99284 EMERGENCY DEPT VISIT MOD MDM: CPT | Mod: Z6 | Performed by: EMERGENCY MEDICINE

## 2019-11-07 PROCEDURE — 96360 HYDRATION IV INFUSION INIT: CPT | Mod: 59

## 2019-11-07 PROCEDURE — 25000128 H RX IP 250 OP 636: Performed by: EMERGENCY MEDICINE

## 2019-11-07 PROCEDURE — 81001 URINALYSIS AUTO W/SCOPE: CPT | Performed by: EMERGENCY MEDICINE

## 2019-11-07 PROCEDURE — 25800030 ZZH RX IP 258 OP 636: Performed by: EMERGENCY MEDICINE

## 2019-11-07 PROCEDURE — 74177 CT ABD & PELVIS W/CONTRAST: CPT

## 2019-11-07 PROCEDURE — 80053 COMPREHEN METABOLIC PANEL: CPT | Performed by: EMERGENCY MEDICINE

## 2019-11-07 PROCEDURE — 99285 EMERGENCY DEPT VISIT HI MDM: CPT | Mod: 25

## 2019-11-07 PROCEDURE — 96361 HYDRATE IV INFUSION ADD-ON: CPT

## 2019-11-07 PROCEDURE — 00000146 ZZHCL STATISTIC GLUCOSE BY METER IP

## 2019-11-07 RX ORDER — ONDANSETRON 2 MG/ML
4 INJECTION INTRAMUSCULAR; INTRAVENOUS EVERY 30 MIN PRN
Status: DISCONTINUED | OUTPATIENT
Start: 2019-11-07 | End: 2019-11-08 | Stop reason: HOSPADM

## 2019-11-07 RX ORDER — IOPAMIDOL 755 MG/ML
124 INJECTION, SOLUTION INTRAVASCULAR ONCE
Status: COMPLETED | OUTPATIENT
Start: 2019-11-07 | End: 2019-11-07

## 2019-11-07 RX ORDER — ONDANSETRON 4 MG/1
4 TABLET, ORALLY DISINTEGRATING ORAL EVERY 6 HOURS PRN
Qty: 10 TABLET | Refills: 0 | Status: SHIPPED | OUTPATIENT
Start: 2019-11-07 | End: 2020-01-29

## 2019-11-07 RX ORDER — HYDROMORPHONE HYDROCHLORIDE 1 MG/ML
0.5 INJECTION, SOLUTION INTRAMUSCULAR; INTRAVENOUS; SUBCUTANEOUS
Status: DISCONTINUED | OUTPATIENT
Start: 2019-11-07 | End: 2019-11-08 | Stop reason: HOSPADM

## 2019-11-07 RX ADMIN — SODIUM CHLORIDE 1000 ML: 9 INJECTION, SOLUTION INTRAVENOUS at 21:04

## 2019-11-07 RX ADMIN — SODIUM CHLORIDE 1000 ML: 9 INJECTION, SOLUTION INTRAVENOUS at 22:26

## 2019-11-07 RX ADMIN — IOPAMIDOL 124 ML: 755 INJECTION, SOLUTION INTRAVENOUS at 21:25

## 2019-11-07 ASSESSMENT — ENCOUNTER SYMPTOMS
FEVER: 0
FLANK PAIN: 1
DYSURIA: 0
CONSTIPATION: 0
VOMITING: 0
DIFFICULTY URINATING: 0
ABDOMINAL PAIN: 1
BLOOD IN STOOL: 0
FREQUENCY: 0
APPETITE CHANGE: 0
NAUSEA: 1
HEMATURIA: 0
DIARRHEA: 0
BACK PAIN: 1

## 2019-11-07 ASSESSMENT — MIFFLIN-ST. JEOR: SCORE: 1733.88

## 2019-11-07 NOTE — ED AVS SNAPSHOT
[facility], [place], Emergency Department  [address] [place] [id number]  Phone:  [phone]                                    Israel Brown   MRN: [id number]    Department:  [facility], Emergency Department   Date of Visit:  11/7/2019           After Visit Summary Signature Page    I have received my discharge instructions, and my questions have been answered. I have discussed any challenges I see with this plan with the nurse or doctor.    ..........................................................................................................................................  Patient/Patient Representative Signature      ..........................................................................................................................................  Patient Representative Print Name and Relationship to Patient    ..................................................               ................................................  Date                                   Time    ..........................................................................................................................................  Reviewed by Signature/Title    ...................................................              ..............................................  Date                                               Time          22EPIC Rev 08/18

## 2019-11-08 NOTE — ED PROVIDER NOTES
"    [place] EMERGENCY DEPARTMENT ([facility])  11/07/19    History     Chief Complaint   Patient presents with     Abdominal Pain     HPI  Israel Brown is a 43 year old male with past medical history of diabetes mellitus,  obstructive sleep apnea, and rheumatoid arthritis who presents with complaint of right lower quadrant abdominal pain radiating to the lower right flank and back. Patient reports associated nausea but no vomiting. He denies fevers, dysuria, urinary frequency or urgency, hematuria, testicular pain or groin masses, constipation, diarrhea, bloody stools, fevers.  He has had a decreased appetite today but he says this is not infrequent for him.  Pain started around 10 AM this morning and was initially intermittent. Patient reported 3 severe episodes of sharp stabbing pain. The pain has been constant since around 5:30 PM today. Patient denies a history of kidney stones and has never had any abdominal surgeries. He has been having normal regular bowel movements.     This part of the medical record was transcribed by Shaun Beach Medical Scribe, from a dictation done by Jessica Bernal MD.       I have reviewed the Medications, Allergies, Past Medical and Surgical History, and Social History in the Epic system.    Review of Systems   Constitutional: Negative for appetite change and fever.   Gastrointestinal: Positive for abdominal pain and nausea. Negative for blood in stool, constipation, diarrhea and vomiting.   Genitourinary: Positive for flank pain. Negative for difficulty urinating, dysuria, frequency, hematuria, testicular pain and urgency.   Musculoskeletal: Positive for back pain.   All other systems reviewed and are negative.      Physical Exam   BP: 124/89  Pulse: 76  Heart Rate: 100  Temp: 97.8  F (36.6  C)  Height: 165.1 cm (5' 5\")  Weight: 92.2 kg (203 lb 4.2 oz)  SpO2: 94 %      Physical Exam  Vitals signs and nursing note reviewed.   Constitutional:       General: He is not in " acute distress.     Appearance: He is well-developed. He is not toxic-appearing or diaphoretic.   HENT:      Head: Normocephalic and atraumatic.      Nose: Nose normal.      Mouth/Throat:      Mouth: Mucous membranes are dry.   Eyes:      General: No scleral icterus.     Conjunctiva/sclera: Conjunctivae normal.   Neck:      Musculoskeletal: Normal range of motion and neck supple. No neck rigidity.   Cardiovascular:      Rate and Rhythm: Regular rhythm. Tachycardia present.      Heart sounds: Normal heart sounds. No murmur.   Pulmonary:      Effort: Pulmonary effort is normal. No respiratory distress.      Breath sounds: Normal breath sounds. No stridor. No wheezing, rhonchi or rales.   Abdominal:      General: Abdomen is protuberant. Bowel sounds are increased. There is no distension.      Palpations: Abdomen is soft. There is no mass.      Tenderness: There is generalized tenderness and tenderness in the right lower quadrant. There is right CVA tenderness, left CVA tenderness and guarding. There is no rebound. Positive signs include McBurney's sign. Negative signs include Gasca's sign and Rovsing's sign.      Hernia: No hernia is present.   Musculoskeletal: Normal range of motion.         General: No deformity.   Skin:     General: Skin is warm and dry.      Findings: No rash.   Neurological:      General: No focal deficit present.      Mental Status: He is alert and oriented to person, place, and time.   Psychiatric:         Mood and Affect: Mood normal.         Behavior: Behavior normal.         Thought Content: Thought content normal.         ED Course        Procedures             Critical Care time:  none             Labs Ordered and Resulted from Time of ED Arrival Up to the Time of Departure from the ED   CBC WITH PLATELETS DIFFERENTIAL - Abnormal; Notable for the following components:       Result Value    RBC Count 6.04 (*)     All other components within normal limits   COMPREHENSIVE METABOLIC PANEL -  Abnormal; Notable for the following components:    Glucose 320 (*)     All other components within normal limits   ROUTINE UA WITH MICROSCOPIC REFLEX TO CULTURE - Abnormal; Notable for the following components:    Glucose Urine >1000 (*)     Specific Gravity Urine >1.035 (*)     All other components within normal limits   GLUCOSE BY METER - Abnormal; Notable for the following components:    Glucose 215 (*)     All other components within normal limits   GLUCOSE MONITOR NURSING POCT   ISTAT CG4 GASES LACTATE ALEJANDRA NURSING POCT         CT Abdomen Pelvis w Contrast   Final Result   IMPRESSION:    1. No acute findings to explain patient's symptoms.   2. Unchanged right hepatic lobe hypodensity, again likely representing   a hemangioma.       I have personally reviewed the examination and initial interpretation   and I agree with the findings.      ZI ALCARAZ MD             Assessments & Plan (with Medical Decision Making)   Israel Brown is a 43 year old male with past medical history of diabetes mellitus,  obstructive sleep apnea, and rheumatoid arthritis who presents with complaint of right lower quadrant abdominal pain radiating to the lower right flank and back.     Differential diagnosis: colon, Ureterolithiasis, pyelonephritis, UTI, appendicitis, colitis    Patient is nontoxic-appearing and in no apparent distress. He has mild generalized abdominal tenderness with guarding. Most notably, pain is more severe in the right lower quadrant. No rebound tenderness or rigidity. Patient has bilateral CVA tenderness but did not report having left flank pain prior to this examination. Bowel sounds are diffusely hyperactive. Labs are unremarkable including a normal white blood cell count and hemoglobin. Normal electrolytes and creatinine. Slightly hyperglycemic to 320 without an anion gap. Will obtain a CT abdomen pelvis with IV contrast given the broad differential of appendicitis versus urolithiasis. Patient given  IV fluids, Zofran, Dilaudid for symptomatic management in the Emergency Department.     This part of the medical record was transcribed by Shaun Beach, Medical Scribe, from a dictation done by Jessica Bernal MD.     CT wnl as above. Discussed results with patient who reported improvement in pain. Had not been given any pain meds in ED as he declined reporting minimal discomfort. Blood glucose improved with 2 L IVF. Vitals remained. Pt comfortable with plan to discharge home and follow up with PCP. Return precautions provided including recurrent pain, fever, bloody stool, vomiting.         I have reviewed the nursing notes.    I have reviewed the findings, diagnosis, plan and need for follow up with the patient.    Discharge Medication List as of 11/7/2019 11:23 PM      START taking these medications    Details   ondansetron (ZOFRAN ODT) 4 MG ODT tab Take 1 tablet (4 mg) by mouth every 6 hours as needed for nausea, Disp-10 tablet, R-0, Local Print             Final diagnoses:   Generalized abdominal pain       11/7/2019   [facility], [place], EMERGENCY DEPARTMENT     Jessica Bernal MD  11/08/19 0036

## 2019-11-08 NOTE — ED TRIAGE NOTES
Pt arrived via car with c/o right lower abdominal quadrant pain radiating to right lower back. Pt reports pain started this morning. Pt reports nausea but no vomiting. Pt also had some dizziness.

## 2019-11-08 NOTE — DISCHARGE INSTRUCTIONS
Please make an appointment to follow up with Your Primary Care Provider as soon as possible unless symptoms completely resolve.    Return to the ED for worsening pain, recurrent vomiting or diarrhea, blood in our stool, dehydration, fever, or abdominal distention and inability to pass gas from below.     Take zofran as needed for nausea. Drink plenty of fluids.

## 2019-12-10 PROBLEM — M25.512 SHOULDER PAIN, LEFT: Status: RESOLVED | Noted: 2019-04-29 | Resolved: 2019-12-10

## 2019-12-10 NOTE — PROGRESS NOTES
Discharge Note    Progress reporting period is from last progress note on 09/11/19 to Oct 10, 2019.    Israel failed to follow up and current status is unknown.  Please see information below for last relevant information on current status.  Patient seen for 10 visits.    SUBJECTIVE  Subjective changes noted by patient:  Patient reports the shoulder was pretty good during the week, but is painful again this AM.  Currently 6/10 pain in the L shoulder.  .  Current pain level is 6/10.     Previous pain level was  3/10.   Changes in function:  Yes (See Goal flowsheet attached for changes in current functional level)  Adverse reaction to treatment or activity: None    OBJECTIVE  Changes noted in objective findings: AROM: 148 deg flex, 135 deg abd, 64 deg ER, ext/IR to waist     ASSESSMENT/PLAN  Diagnosis: Left Frozen Shoulder, partial RC tear Left   Updated problem list and treatment plan:   Pain - HEP  Decreased ROM/flexibility - HEP  Decreased function - HEP  STG/LTGs have been met or progress has been made towards goals:  Yes, please see goal flowsheet for most current information  Assessment of Progress: current status is unknown.    Last current status:     Self Management Plans:  HEP  I have re-evaluated this patient and find that the nature, scope, duration and intensity of the therapy is appropriate for the medical condition of the patient.  Israel continues to require the following intervention to meet STG and LTG's:  HEP.    Recommendations:  Discharge with current home program.  Patient to follow up with MD as needed.    Please refer to the daily flowsheet for treatment today, total treatment time and time spent performing 1:1 timed codes.

## 2019-12-12 ENCOUNTER — PRE VISIT (OUTPATIENT)
Dept: PULMONOLOGY | Facility: CLINIC | Age: 44
End: 2019-12-12

## 2020-01-29 ENCOUNTER — OFFICE VISIT (OUTPATIENT)
Dept: PULMONOLOGY | Facility: CLINIC | Age: 45
End: 2020-01-29
Payer: COMMERCIAL

## 2020-01-29 VITALS
SYSTOLIC BLOOD PRESSURE: 144 MMHG | OXYGEN SATURATION: 95 % | WEIGHT: 202 LBS | HEIGHT: 65 IN | DIASTOLIC BLOOD PRESSURE: 96 MMHG | HEART RATE: 92 BPM | RESPIRATION RATE: 17 BRPM | BODY MASS INDEX: 33.66 KG/M2

## 2020-01-29 DIAGNOSIS — J45.20 MILD INTERMITTENT ASTHMA, UNSPECIFIED WHETHER COMPLICATED: Primary | ICD-10-CM

## 2020-01-29 PROCEDURE — G0463 HOSPITAL OUTPT CLINIC VISIT: HCPCS | Mod: ZF

## 2020-01-29 ASSESSMENT — PAIN SCALES - GENERAL: PAINLEVEL: NO PAIN (0)

## 2020-01-29 ASSESSMENT — MIFFLIN-ST. JEOR: SCORE: 1723.15

## 2020-01-29 NOTE — NURSING NOTE
Chief Complaint   Patient presents with     RECHECK     3 months follow up     Medications reviewed and vital signs taken.   Emliy Diamond CMA

## 2020-01-29 NOTE — PROGRESS NOTES
"Pulmonary Clinic Initial Visit Note    CC: \"Cough\"      HPI:   43M with PMHx of Asthma, KEKE, GERD who is presenting for follow-up on Cough. His Spirometry from outside facility showed FEV1/FVC 89%, FEV1 66%, FVC 58%. On his previous visit to the clinic he had complain of cough and GERD. Was maintained on JESUS, PPI and lifestyle modification for GERD.     Today on presentation he reports improving, however still present cough. Cough is mostly dry and worse at night. He has been taking PPI on most day, but not every day. He reports that he has on JESUS, ICS/LABA combo about 10 years ago for his asthma, however was later taken off. He denies any recent fever, chills, chest pain or worsening SOB.       PMH:  Past Medical History:   Diagnosis Date     Asthma      Diabetes mellitus (H)      Neuropathy      RA (rheumatoid arthritis) (H)        Allergies:  Allergies   Allergen Reactions     Cortisone      Hmg-Coa-R Inhibitors Other (See Comments)     Side effect: myalgias. Ok with zocor only     Penicillins          Medications:  Current Outpatient Medications   Medication Sig Dispense Refill     albuterol (PROAIR HFA, PROVENTIL HFA, VENTOLIN HFA) 108 (90 BASE) MCG/ACT inhaler Inhale 2 puffs into the lungs every 6 hours       dapagliflozin (FARXIGA) 10 MG TABS tablet Take 10 mg by mouth       fluticasone (FLONASE) 50 MCG/ACT spray Spray 1 spray in nostril daily       insulin aspart (NOVOLOG FLEXPEN) 100 UNIT/ML injection Inject Subcutaneous 3 times daily (with meals)       insulin glargine (LANTUS) 100 UNIT/ML injection Inject 60 Units Subcutaneous every morning       ipratropium - albuterol 0.5 mg/2.5 mg/3 mL (DUONEB) 0.5-2.5 (3) MG/3ML neb solution 3 mLs       levothyroxine (SYNTHROID/LEVOTHROID) 50 MCG tablet Take 50 mcg by mouth       lisinopril (PRINIVIL/ZESTRIL) 5 MG tablet Take 5 mg by mouth       methocarbamol (ROBAXIN) 500 MG tablet Take 500 mg by mouth       methylPREDNISolone (MEDROL DOSEPAK) 4 MG tablet therapy " pack        montelukast (SINGULAIR) 10 MG tablet Take 10 mg by mouth At Bedtime       omeprazole (PRILOSEC) 20 MG CR capsule Take 20 mg by mouth daily       Pregabalin (LYRICA PO)        psyllium (METAMUCIL/KONSYL) capsule Take 1 capsule by mouth daily (Patient not taking: Reported on 10/23/2019) 90 capsule 3     simvastatin (ZOCOR) 40 MG tablet Take 40 mg by mouth At Bedtime         Family History:  No family history on file.    ROS: Complete 10 point ROS negative unless mentioned in HPI    Physical Exam:  There were no vitals taken for this visit.    General: Sitting in the chair in NAD  HEENT: anicteric, moist mucosa  Neck: no palpable lymphadenopathy, no JVD noted  Chest: CTAB, no wheezing  Cardiac: RRR no murmurs  Abdomen: Soft, flat, non tender, active BS  Extremities: No LE Edema  Neuro: A&Ox3, no focal defecits  Skin: no rash noted        Labs and Radiology:  Reviewed     PFT's:  PFT Latest Ref Rng & Units 10/23/2019   FVC L 2.83   FEV1 L 2.31   FVC% % 65   FEV1% % 66         Assessment and Plan:  Israel Brown is a 44 year old male with asthma, GERD who is presenting for follow up on cough. He was started on PPI, last visit and still continues to have cough, although has improved.  Cough is likely multifactorial 2/2 to asthma & GERD.  Will restart him on his Advair, and continue PPI and GERD lifestyle modification. Will follow up in 6 months.     # Asthma   # GERD     Plan   - JESUS and ICS/LABA   - PPI and GERD lifestyle modifications.     Seen and staffed with Dr. Holt.  Steve Cassidy MD  Pulmonary and Critical Care Fellow            Answers for HPI/ROS submitted by the patient on 1/28/2020   General Symptoms: No  Skin Symptoms: No  HENT Symptoms: No  EYE SYMPTOMS: No  HEART SYMPTOMS: No  LUNG SYMPTOMS: No  INTESTINAL SYMPTOMS: No  URINARY SYMPTOMS: No  REPRODUCTIVE SYMPTOMS: No  SKELETAL SYMPTOMS: No  BLOOD SYMPTOMS: No  NERVOUS SYSTEM SYMPTOMS: No  MENTAL HEALTH SYMPTOMS: No    [facility] NOTE    The patient was seen and examined with the resident/fellow physician.  We have discussed the patient in detail and I agree with the findings, assessment, and plan as documented when this note was cosigned on February 11, 2020. I have evaluated all laboratory values and imaging studies for the past 24 hours. I have reviewed all the consults that have been ordered and are active for this patient.      Billing: The patient was seen and examined by me and the findings, assessment, and plan as documented was explained to the patient/family who expressed understand.     Casimiro Holt MD   of Medicine  Interventional Pulmonary  Department of Pulmonary, Allergy, Critical Care and Sleep Medicine   [facility], [place]  Pager: [phone]

## 2020-02-24 ENCOUNTER — HEALTH MAINTENANCE LETTER (OUTPATIENT)
Age: 45
End: 2020-02-24

## 2020-05-13 ENCOUNTER — PATIENT OUTREACH (OUTPATIENT)
Dept: PULMONOLOGY | Facility: CLINIC | Age: 45
End: 2020-05-13

## 2020-05-13 NOTE — PROGRESS NOTES
Received a call from patient and his significant other requesting an ILD appointment with Dr. Perlman.  Patient has seen Dr. Wilson in the fellows clinic but thinks that he has ILD.  Ex

## 2020-11-02 ENCOUNTER — HOSPITAL ENCOUNTER (EMERGENCY)
Facility: CLINIC | Age: 45
Discharge: HOME | End: 2020-11-02
Attending: EMERGENCY MEDICINE | Admitting: EMERGENCY MEDICINE
Payer: COMMERCIAL

## 2020-11-02 VITALS
OXYGEN SATURATION: 96 % | TEMPERATURE: 98.9 F | RESPIRATION RATE: 18 BRPM | SYSTOLIC BLOOD PRESSURE: 149 MMHG | DIASTOLIC BLOOD PRESSURE: 84 MMHG | HEART RATE: 96 BPM

## 2020-11-02 DIAGNOSIS — R52 GENERALIZED BODY ACHES: Primary | ICD-10-CM

## 2020-11-02 DIAGNOSIS — R11.0 NAUSEA: ICD-10-CM

## 2020-11-02 DIAGNOSIS — K08.409 HISTORY OF TOOTH EXTRACTION, UNSPECIFIED EDENTULISM CLASS: ICD-10-CM

## 2020-11-02 DIAGNOSIS — Z20.822 PERSON UNDER INVESTIGATION FOR COVID-19: ICD-10-CM

## 2020-11-02 DIAGNOSIS — R68.83 CHILLS: ICD-10-CM

## 2020-11-02 LAB
ALBUMIN SERPL-MCNC: 3.8 G/DL (ref 3.4–5)
ALP SERPL-CCNC: 79 U/L (ref 40–150)
ALT SERPL W P-5'-P-CCNC: 42 U/L (ref 0–70)
ANION GAP SERPL CALCULATED.3IONS-SCNC: 3 MMOL/L (ref 3–14)
AST SERPL W P-5'-P-CCNC: 21 U/L (ref 0–45)
BASOPHILS # BLD AUTO: 0.1 10E9/L (ref 0–0.2)
BASOPHILS NFR BLD AUTO: 0.5 %
BILIRUB SERPL-MCNC: 0.5 MG/DL (ref 0.2–1.3)
BUN SERPL-MCNC: 24 MG/DL (ref 7–30)
CALCIUM SERPL-MCNC: 9 MG/DL (ref 8.5–10.1)
CHLORIDE SERPL-SCNC: 109 MMOL/L (ref 94–109)
CO2 SERPL-SCNC: 26 MMOL/L (ref 20–32)
CREAT SERPL-MCNC: 1.34 MG/DL (ref 0.66–1.25)
DIFFERENTIAL METHOD BLD: ABNORMAL
EOSINOPHIL # BLD AUTO: 0.5 10E9/L (ref 0–0.7)
EOSINOPHIL NFR BLD AUTO: 4.5 %
ERYTHROCYTE [DISTWIDTH] IN BLOOD BY AUTOMATED COUNT: 15.8 % (ref 10–15)
GFR SERPL CREATININE-BSD FRML MDRD: 63 ML/MIN/{1.73_M2}
GLUCOSE BLDC GLUCOMTR-MCNC: 101 MG/DL (ref 70–99)
GLUCOSE SERPL-MCNC: 113 MG/DL (ref 70–99)
HCT VFR BLD AUTO: 51.3 % (ref 40–53)
HGB BLD-MCNC: 17.2 G/DL (ref 13.3–17.7)
IMM GRANULOCYTES # BLD: 0 10E9/L (ref 0–0.4)
IMM GRANULOCYTES NFR BLD: 0.3 %
INTERPRETATION ECG - MUSE: NORMAL
LIPASE SERPL-CCNC: 74 U/L (ref 73–393)
LYMPHOCYTES # BLD AUTO: 1.4 10E9/L (ref 0.8–5.3)
LYMPHOCYTES NFR BLD AUTO: 14.2 %
MCH RBC QN AUTO: 27.8 PG (ref 26.5–33)
MCHC RBC AUTO-ENTMCNC: 33.5 G/DL (ref 31.5–36.5)
MCV RBC AUTO: 83 FL (ref 78–100)
MONOCYTES # BLD AUTO: 0.5 10E9/L (ref 0–1.3)
MONOCYTES NFR BLD AUTO: 5.4 %
NEUTROPHILS # BLD AUTO: 7.6 10E9/L (ref 1.6–8.3)
NEUTROPHILS NFR BLD AUTO: 75.1 %
NRBC # BLD AUTO: 0 10*3/UL
NRBC BLD AUTO-RTO: 0 /100
PLATELET # BLD AUTO: 308 10E9/L (ref 150–450)
POTASSIUM SERPL-SCNC: 4 MMOL/L (ref 3.4–5.3)
PROT SERPL-MCNC: 7.3 G/DL (ref 6.8–8.8)
RBC # BLD AUTO: 6.19 10E12/L (ref 4.4–5.9)
SODIUM SERPL-SCNC: 138 MMOL/L (ref 133–144)
WBC # BLD AUTO: 10.1 10E9/L (ref 4–11)

## 2020-11-02 PROCEDURE — 80053 COMPREHEN METABOLIC PANEL: CPT | Performed by: EMERGENCY MEDICINE

## 2020-11-02 PROCEDURE — 83690 ASSAY OF LIPASE: CPT | Performed by: EMERGENCY MEDICINE

## 2020-11-02 PROCEDURE — C9803 HOPD COVID-19 SPEC COLLECT: HCPCS

## 2020-11-02 PROCEDURE — 93005 ELECTROCARDIOGRAM TRACING: CPT

## 2020-11-02 PROCEDURE — 85025 COMPLETE CBC W/AUTO DIFF WBC: CPT | Performed by: EMERGENCY MEDICINE

## 2020-11-02 PROCEDURE — 999N001017 HC STATISTIC GLUCOSE BY METER IP

## 2020-11-02 PROCEDURE — U0003 INFECTIOUS AGENT DETECTION BY NUCLEIC ACID (DNA OR RNA); SEVERE ACUTE RESPIRATORY SYNDROME CORONAVIRUS 2 (SARS-COV-2) (CORONAVIRUS DISEASE [COVID-19]), AMPLIFIED PROBE TECHNIQUE, MAKING USE OF HIGH THROUGHPUT TECHNOLOGIES AS DESCRIBED BY CMS-2020-01-R: HCPCS | Performed by: EMERGENCY MEDICINE

## 2020-11-02 PROCEDURE — 250N000011 HC RX IP 250 OP 636: Performed by: EMERGENCY MEDICINE

## 2020-11-02 PROCEDURE — 99284 EMERGENCY DEPT VISIT MOD MDM: CPT

## 2020-11-02 PROCEDURE — 250N000013 HC RX MED GY IP 250 OP 250 PS 637: Performed by: EMERGENCY MEDICINE

## 2020-11-02 RX ORDER — ONDANSETRON 4 MG/1
4 TABLET, ORALLY DISINTEGRATING ORAL EVERY 8 HOURS PRN
Qty: 10 TABLET | Refills: 0 | Status: SHIPPED | OUTPATIENT
Start: 2020-11-02

## 2020-11-02 RX ORDER — ONDANSETRON 4 MG/1
4 TABLET, ORALLY DISINTEGRATING ORAL ONCE
Status: COMPLETED | OUTPATIENT
Start: 2020-11-02 | End: 2020-11-02

## 2020-11-02 RX ORDER — ACETAMINOPHEN 325 MG/1
650 TABLET ORAL ONCE
Status: COMPLETED | OUTPATIENT
Start: 2020-11-02 | End: 2020-11-02

## 2020-11-02 RX ORDER — IBUPROFEN 600 MG/1
600 TABLET, FILM COATED ORAL ONCE
Status: COMPLETED | OUTPATIENT
Start: 2020-11-02 | End: 2020-11-02

## 2020-11-02 RX ADMIN — IBUPROFEN 600 MG: 600 TABLET, FILM COATED ORAL at 21:43

## 2020-11-02 RX ADMIN — ONDANSETRON 4 MG: 4 TABLET, ORALLY DISINTEGRATING ORAL at 21:42

## 2020-11-02 RX ADMIN — ACETAMINOPHEN 650 MG: 325 TABLET ORAL at 21:44

## 2020-11-02 ASSESSMENT — ENCOUNTER SYMPTOMS
FEVER: 1
VOMITING: 0
APPETITE CHANGE: 1
CHILLS: 1
NAUSEA: 1

## 2020-11-02 NOTE — ED AVS SNAPSHOT
[facility] Emergency Dept  [address] [address]  Phone: [phone]  Fax: [phone]                                    Israel Brown   MRN: [id number]    Department: [facility] Emergency Dept   Date of Visit: 11/2/2020           After Visit Summary Signature Page    I have received my discharge instructions, and my questions have been answered. I have discussed any challenges I see with this plan with the nurse or doctor.    ..........................................................................................................................................  Patient/Patient Representative Signature      ..........................................................................................................................................  Patient Representative Print Name and Relationship to Patient    ..................................................               ................................................  Date                                   Time    ..........................................................................................................................................  Reviewed by Signature/Title    ...................................................              ..............................................  Date                                               Time          22EPIC Rev 08/18

## 2020-11-03 LAB
SARS-COV-2 RNA SPEC QL NAA+PROBE: NOT DETECTED
SPECIMEN SOURCE: NORMAL

## 2020-11-03 NOTE — ED PROVIDER NOTES
History     Chief Complaint:  Fatigue    HPI  Israel Brown is a 44 year old male with a history of asthma, rheumatoid arthritis, and diabetes mellitus type 2 who presents for evaluation of fatigue and nausea. The patient underwent right sided upper moral extraction today. He notes that since the procedure he has been feeling nauseated with no desire to eat, fatigued, and vacillating between chills and feeling subjectively febrile. Her, he notes he needs to eat something soon to control his sugars.     Allergies:  Cortisone  Penicillins  Statins     Medications:   Zofran   Albuterol   Farxiga   Flonase   Novolog   Lantus   Advair   Synthroid   Medrol Dosepak   Prilosec   Singulair   Robaxin   Lyrica  Zocor   Prilosec     Medical History:   Asthma  diabetes mellitus type 2   rheumatoid arthritis   Neuropathy   Sleep apnea   Cellulitis  hypothyroidism   Leg edema  degenerative joint disease   Hypercholesterolemia   Insomnia     Surgical History   Realign patella    Family History:   History reviewed. No pertinent family history.      Social History:  Patient was not accompanied to the ED.  Smoking Status: Negative   Smokeless Tobacco: Negative   Alcohol Use: Negative   Drug Use: Negative   Primary Physician: Edmund Olson     Review of Systems   Constitutional: Positive for appetite change, chills and fever (sujective, feels warm).   Gastrointestinal: Positive for nausea. Negative for vomiting.   All other systems reviewed and are negative.    Physical Exam     Patient Vitals for the past 24 hrs:   BP Temp Temp src Pulse Resp SpO2   11/02/20 1955 (!) 149/84 98.9  F (37.2  C) Oral 96 18 96 %      Physical Exam  General: Alert, appears well-developed and well-nourished. Cooperative.     In mild distress  HEENT:  Head:  Atraumatic  Ears:  External ears are normal  Mouth/Throat:  Oropharynx is without erythema or exudate and mucous membranes are moist.  Recent extraction of either tooth #15 or 16.   Eyes:    Conjunctivae normal and EOM are normal. No scleral icterus.  CV:  Normal rate, regular rhythm, normal heart sounds and radial pulses are 2+ and symmetric.  No murmur.  Resp:  Breath sounds are clear bilaterally    Non-labored, no retractions or accessory muscle use  GI:  Abdomen is soft, no distension, no tenderness. No rebound or guarding.  No CVA tenderness bilaterally  MS:  Normal range of motion. No edema.    Normal strength in all 4 extremities.     Back atraumatic.    No midline cervical, thoracic, or lumbar tenderness  Skin:  Warm and dry.  No rash or lesions noted.  Neuro: Alert. Normal strength.  GCS: 15  Psych:  Normal mood and affect.    Emergency Department Course     ECG:  ECG taken at 2007, ECG read at 2022  Normal sinus rhythm  Normal ECG  Rate 89 bpm. WV interval 142 ms. QRS duration 80 ms. QT/QTc 332/403 ms. P-R-T axes 32 49 18.     Laboratory:  Laboratory findings were communicated with the patient who voiced understanding of the findings.    Lipase: 74  CBC: WBC 10.1, HGB 17.2,    CMP: Glucose 113 (H)  o/w WNL (Creatinine 1.34 (H))    Glucose by Meter (Collected 1952): 101 (H)   Symptomatic COVID-19 Virus (Coronavirus), PCR NP Swab: pending        Interventions:   2144 Tylenol 650 mg PO  2143 Advil 600 mg PO  2142 Zofran 4 mg PO    Emergency Department Course:  1952 EKG obtained as noted above.     1954 Nursing notes and vitals reviewed.   I performed an exam of the patient as documented above.     2016 IV was inserted and blood was drawn for laboratory testing, results above.    Findings and plan explained to the Patient. Patient discharged home with instructions regarding supportive care, medications, and reasons to return. The importance of close follow-up was reviewed. The patient was prescribed as below.    Impression & Plan     Medical Decision Making:  Israel Brown is a 44-year-old male with a history of asthma, RA, and diabetes who presents with fatigue and nausea  following a dental extraction earlier today.  Patient is worried about what the cause of his generalized body aches are.  Certainly this could be a prodrome on an impending viral infection.  In the setting of a worldwide pandemic of COVID-19 we did elect to swab for COVID-19 tonight.  Reassuringly he has no focal chest pain, cough, abdominal pain, vomiting, or diarrhea.  With no focalizing symptoms, no indication for advanced CT or MRI imaging at this time.  We did obtain an EKG and brief laboratory work, which reassuringly showed no signs of leukocytosis, renal dysfunction, or electrolyte abnormalities.  He was given a dose of Zofran for his nausea as well as Tylenol and ibuprofen for body aches.  We discussed use of Zofran and to continue Tylenol and ibuprofen at home.  He understands that over the next several days he may develop new symptoms that may be concerning enough to warrant repeat emergency department evaluation.  At this time with stable vital signs and normal blood work no indication for hospitalization or further work-up in the emergent setting tonight.  He had all questions answered understood strict return precautions for any worsening symptoms.  After all questions answered return precautions understood, discharged home.    Covid-19  Israel Brown was evaluated during a global COVID-19 pandemic, which necessitated consideration that the patient might be at risk for infection with the SARS-CoV-2 virus that causes COVID-19.   Applicable protocols for evaluation were followed during the patient's care.   COVID-19 was considered as part of the patient's evaluation. The plan for testing is:  a test was obtained during this visit.    Diagnosis:     ICD-10-CM    1. Chills  R68.83 CANCELED: CBC with platelets differential     CANCELED: Basic metabolic panel   2. Nausea  R11.0    3. History of tooth extraction, unspecified edentulism class  K08.409         Disposition:  Discharged to  home.    Discharge Medications:  New Prescriptions    ONDANSETRON (ZOFRAN ODT) 4 MG ODT TAB    Take 1 tablet (4 mg) by mouth every 8 hours as needed for nausea       Scribe Disclosure:  I, August Chele, am serving as a scribe at 8:13 PM on 11/2/2020 to document services personally performed by Rustam Oro MD based on my observations and the provider's statements to me.     [facility]     Rustam Oro MD  11/02/20 [id number]

## 2020-11-03 NOTE — ED NOTES
Bed: ED27  Expected date: 11/2/20  Expected time: 7:29 PM  Means of arrival:   Comments:  [facility] 44 year old fever weakness

## 2020-11-03 NOTE — ED TRIAGE NOTES
"Pt diabetic. Tooth pulled at 11am. [place] fine after. Went to work at 3pm and felt a little \"off\" chills and like his sugar was high but 120 per EMS.   "

## 2020-11-03 NOTE — DISCHARGE INSTRUCTIONS
You were seen in the emergency department tonight for nausea and chills.  This may be due to recent dental extraction earlier today.  Reassuringly your blood work looks completely normal.  Your EKG did not show any signs of a heart attack.  Your nausea improved after Zofran which has been prescribed for you to use at home every 8 hours as needed.    You also have generalized body aches which may be a sign of an early virus.  Your swab for COVID-19 tonight and need to remain in quarantine until your COVID-19 swab returns or if your symptoms have improved.    Please follow-up with your primary care provider if symptoms persist.  If you develop a fever, cough, chest pain, or shortness of breath we need to see you back in the emergency department.

## 2020-12-13 ENCOUNTER — HEALTH MAINTENANCE LETTER (OUTPATIENT)
Age: 45
End: 2020-12-13

## 2021-03-24 ENCOUNTER — APPOINTMENT (OUTPATIENT)
Dept: GENERAL RADIOLOGY | Facility: CLINIC | Age: 46
End: 2021-03-24
Attending: EMERGENCY MEDICINE
Payer: COMMERCIAL

## 2021-03-24 ENCOUNTER — HOSPITAL ENCOUNTER (EMERGENCY)
Facility: CLINIC | Age: 46
Discharge: HOME | End: 2021-03-24
Attending: EMERGENCY MEDICINE | Admitting: EMERGENCY MEDICINE
Payer: COMMERCIAL

## 2021-03-24 VITALS
SYSTOLIC BLOOD PRESSURE: 121 MMHG | RESPIRATION RATE: 18 BRPM | OXYGEN SATURATION: 97 % | DIASTOLIC BLOOD PRESSURE: 84 MMHG | WEIGHT: 195 LBS | HEART RATE: 70 BPM | TEMPERATURE: 97.6 F | HEIGHT: 65 IN | BODY MASS INDEX: 32.49 KG/M2

## 2021-03-24 DIAGNOSIS — R07.9 CHEST PAIN, UNSPECIFIED TYPE: ICD-10-CM

## 2021-03-24 LAB
ANION GAP SERPL CALCULATED.3IONS-SCNC: 6 MMOL/L (ref 3–14)
BASOPHILS # BLD AUTO: 0.1 10E9/L (ref 0–0.2)
BASOPHILS NFR BLD AUTO: 0.8 %
BUN SERPL-MCNC: 17 MG/DL (ref 7–30)
CALCIUM SERPL-MCNC: 8.2 MG/DL (ref 8.5–10.1)
CHLORIDE SERPL-SCNC: 104 MMOL/L (ref 94–109)
CO2 SERPL-SCNC: 26 MMOL/L (ref 20–32)
CREAT SERPL-MCNC: 0.89 MG/DL (ref 0.66–1.25)
D DIMER PPP FEU-MCNC: <0.3 UG/ML FEU (ref 0–0.5)
DIFFERENTIAL METHOD BLD: NORMAL
EOSINOPHIL # BLD AUTO: 0.4 10E9/L (ref 0–0.7)
EOSINOPHIL NFR BLD AUTO: 5.7 %
ERYTHROCYTE [DISTWIDTH] IN BLOOD BY AUTOMATED COUNT: 14.2 % (ref 10–15)
GFR SERPL CREATININE-BSD FRML MDRD: >90 ML/MIN/{1.73_M2}
GLUCOSE SERPL-MCNC: 156 MG/DL (ref 70–99)
HCT VFR BLD AUTO: 45.1 % (ref 40–53)
HGB BLD-MCNC: 14.9 G/DL (ref 13.3–17.7)
IMM GRANULOCYTES # BLD: 0 10E9/L (ref 0–0.4)
IMM GRANULOCYTES NFR BLD: 0.3 %
INTERPRETATION ECG - MUSE: NORMAL
LYMPHOCYTES # BLD AUTO: 1.3 10E9/L (ref 0.8–5.3)
LYMPHOCYTES NFR BLD AUTO: 17.4 %
MCH RBC QN AUTO: 26.9 PG (ref 26.5–33)
MCHC RBC AUTO-ENTMCNC: 33 G/DL (ref 31.5–36.5)
MCV RBC AUTO: 81 FL (ref 78–100)
MONOCYTES # BLD AUTO: 0.6 10E9/L (ref 0–1.3)
MONOCYTES NFR BLD AUTO: 7.9 %
NEUTROPHILS # BLD AUTO: 5 10E9/L (ref 1.6–8.3)
NEUTROPHILS NFR BLD AUTO: 67.9 %
NRBC # BLD AUTO: 0 10*3/UL
NRBC BLD AUTO-RTO: 0 /100
PLATELET # BLD AUTO: 286 10E9/L (ref 150–450)
POTASSIUM SERPL-SCNC: 3.8 MMOL/L (ref 3.4–5.3)
RBC # BLD AUTO: 5.54 10E12/L (ref 4.4–5.9)
SODIUM SERPL-SCNC: 136 MMOL/L (ref 133–144)
TROPONIN I SERPL-MCNC: <0.015 UG/L (ref 0–0.04)
WBC # BLD AUTO: 7.4 10E9/L (ref 4–11)

## 2021-03-24 PROCEDURE — 93005 ELECTROCARDIOGRAM TRACING: CPT

## 2021-03-24 PROCEDURE — 85379 FIBRIN DEGRADATION QUANT: CPT | Performed by: EMERGENCY MEDICINE

## 2021-03-24 PROCEDURE — 99285 EMERGENCY DEPT VISIT HI MDM: CPT | Mod: 25

## 2021-03-24 PROCEDURE — 71046 X-RAY EXAM CHEST 2 VIEWS: CPT

## 2021-03-24 PROCEDURE — 84484 ASSAY OF TROPONIN QUANT: CPT | Performed by: EMERGENCY MEDICINE

## 2021-03-24 PROCEDURE — 85025 COMPLETE CBC W/AUTO DIFF WBC: CPT | Performed by: EMERGENCY MEDICINE

## 2021-03-24 PROCEDURE — 80048 BASIC METABOLIC PNL TOTAL CA: CPT | Performed by: EMERGENCY MEDICINE

## 2021-03-24 ASSESSMENT — ENCOUNTER SYMPTOMS
ABDOMINAL PAIN: 0
VOMITING: 0
HEMATURIA: 0

## 2021-03-24 ASSESSMENT — MIFFLIN-ST. JEOR: SCORE: 1686.39

## 2021-03-24 NOTE — ED PROVIDER NOTES
History   Chief Complaint:  Chest Pain        The history is provided by the patient.      Israel Brown is a 45 year old male with history of type 2 diabetes mellitus who presents with chest pain. Patient woke up around 0430 today and developed chest pain around 0600 at work. Patient works for a manufacturing company. Patient had a sudden onset of chest pain when he was closing the door. No heavy lifting. He describes the pain to be intermittent and has a squeezing sensation. Each episode lasts for about 5 minutes. Pain centralizes in the middle of the chest and is non radiating. Pain exacerbates with movement and he has some pain in the right upper arm as well. Here in the ED, patient reports pain to the right arm. Chest pain has since resolved. Patient has a history of diabetes mellitus which he takes insulin. Patient denies vomiting, abdominal pain, blood in urine, or loss of taste or smell. He further denies history hypertension, DVT/PE, high cholesterol, or tobacco use. No recent travel or known COVID exposure. Patient is not on hormone. Of note, patient had a stress test few years ago.     PE/DVT Risk Factors:  The patient denies a personal or family history of PE, DVT, or other clotting disorders. The patient denies any recent travel, surgery, or other prolonged immobilization. The patient denies tobacco use or hormone use.    Review of Systems   Cardiovascular: Positive for chest pain.   Gastrointestinal: Negative for abdominal pain and vomiting.   Genitourinary: Negative for hematuria.   All other systems reviewed and are negative.    Allergies:  Cortisone  Penicillins  Statins     Medications:  Zofran   Albuterol   Farxiga   Flonase   Novolog   Lantus   Advair   Synthroid   Medrol Dosepak   Prilosec   Singulair   Robaxin   Lyrica  Zocor   Prilosec     Past Medical History:    Asthma  diabetes mellitus type 2   rheumatoid arthritis   Neuropathy   Sleep apnea   Cellulitis  hypothyroidism   Leg  "edema  degenerative joint disease   Hypercholesterolemia   Insomnia      Past Surgical History:    Realign patella    Family History:    Parents: diabetes mellitus, heart attack    Social History:  Patient presents alone.  No tobacco use   Primary Physician: Edmund Olson    Physical Exam     Patient Vitals for the past 24 hrs:   BP Temp Temp src Pulse Resp SpO2 Height Weight   03/24/21 1813 -- -- -- -- -- 97 % -- --   03/24/21 1812 121/84 -- -- 70 -- -- -- --   03/24/21 1550 122/70 97.6  F (36.4  C) Temporal 81 18 97 % 1.651 m (5' 5\") 88.5 kg (195 lb)       Physical Exam  General:  Sitting on bed by self at bedside.   HENT:  No obvious trauma to head  Right Ear:  External ear normal.   Left Ear:  External ear normal.   Nose:  Nose normal.   Eyes:  Conjunctivae and EOM are normal. Pupils are equal, round, and reactive.   Neck: Normal range of motion. Neck supple. No tracheal deviation present.   CV:  Normal heart sounds. No murmur heard.  Pulm/Chest: Effort normal and breath sounds normal.   Abd: Soft. No distension. There is no tenderness. There is no rigidity, no rebound and no guarding.   M/S: Normal range of motion.   Neuro: Alert. GCS 15.  Skin: Skin is warm and dry. No rash noted. Not diaphoretic.   Psych: Normal mood and affect. Behavior is normal.       Emergency Department Course     ECG:  ECG taken at 1551, ECG read at 1645  Normal sinus rhythm. Normal ECG.   No significant change as compared to prior, dated 11/2/2020.  Rate 79 bpm. WA interval 142 ms. QRS duration 78 ms. QT/QTc 360/412 ms. P-R-T axes 33 -11 26.       Imaging:  XR Chest 2 Views  No acute cardiopulmonary process.  Reading per radiology.     Laboratory:  CBC: WBC: 7.4, HGB: 14.9, PLT: 286    BMP: Glucose 156 (H), Calcium: 8.2 (L), o/w WNL (Creatinine: 0.89)    Troponin (Collected 1703): <0.015    D-dimer: <0.3      Emergency Department Course:    Reviewed:  I reviewed nursing notes, vitals, past medical history and care " everywhere    Assessments:  1650 I obtained history and examined the patient as noted above.   1801 I reassessed the patient and discussed the results of the evaluation thus far.     Disposition:  The patient was discharged to home.       Impression & Plan   Medical Decision Making:  Israel Brown is a very pleasant 45 year old year old male who presents to the emergency department with concern of chest pain of unclear etiology.  At this time I do not suspect that there is an acute/dangerous pathology for the chest pain.  They have no significant personal/family history of cardiac disease. They have no significant cardiac risk factors other then diabetes.  The EKG was reviewed and shows no significant ST changes and no evidence of Brugada syndrome, Ramirez-Parkinson-White, hypertrophic cardiomyopathy or prolonged QTc syndrome. The chest xray was reviewed and shows no evidence of pneumothorax, infiltrate to suggest pneumonia, widened mediastinum to suggest aortic dissection, obvious rib fracture or free air under the diaphragm to suggest perforated viscous ulcer. Their troponin was negative after 12 hours of symptoms. The patient does not smoke and is otherwise PERC negative and a D-dimer is negative; therefore, I doubt pulmonary embolism.  The patient has not had recent fevers or viral infections to suggest pericarditis. They have not had recent chest trauma.  All of this was discussed with the patient and they were reassurred.  I had a very thorough discussion about the risk and benefit of observation admission versus continued outpatient work-up.  In shared decision-making and after this work-up and reviewing his HEART score of 2, placing him at low risk, he elected for continued outpatient work-up.  I have ordered an outpatient stress test for the patient to schedule sometime early next week. I have advised them to follow-up with their PCP in the next 3 days to get further evaluation, and to return to the ED  sooner if their chest pain continues/worsens, they develop severe SOB/fevers/lightheadedness, or they develop any other new and concerning symptoms. At this point the patient is stable and appropriate for discharge.    The treatment plan was discussed with the patient and they expressed understanding of this plan and consented to the plan.  In addition, the patient will return to the emergency department if their symptoms persist, worsen, if new symptoms arise or if there is any concern as other pathology may be present that is not evident at this time. They also understand the importance of close follow up in the clinic and if unable to do so will return to the emergency department for a reevaluation. All questions were answered.     Diagnosis:    ICD-10-CM    1. Chest pain, unspecified type  R07.9 Exercise Stress Echocardiogram         Scribe Disclosure:  I, Page Bruno, am serving as a scribe at 4:50 PM on 3/24/2021 to document services personally performed by Gigi Uribe DO based on my observations and the provider's statements to me.     Gigi Uribe DO  03/24/21 [id number]

## 2021-04-09 ENCOUNTER — HOSPITAL ENCOUNTER (OUTPATIENT)
Dept: CARDIOLOGY | Facility: CLINIC | Age: 46
Discharge: HOME | End: 2021-04-09
Attending: EMERGENCY MEDICINE | Admitting: EMERGENCY MEDICINE
Payer: COMMERCIAL

## 2021-04-09 DIAGNOSIS — R07.9 CHEST PAIN, UNSPECIFIED TYPE: ICD-10-CM

## 2021-04-09 PROCEDURE — 93350 STRESS TTE ONLY: CPT | Mod: 26 | Performed by: INTERNAL MEDICINE

## 2021-04-09 PROCEDURE — 93325 DOPPLER ECHO COLOR FLOW MAPG: CPT | Mod: 26 | Performed by: INTERNAL MEDICINE

## 2021-04-09 PROCEDURE — 255N000002 HC RX 255 OP 636: Performed by: EMERGENCY MEDICINE

## 2021-04-09 PROCEDURE — 93321 DOPPLER ECHO F-UP/LMTD STD: CPT | Mod: 26 | Performed by: INTERNAL MEDICINE

## 2021-04-09 PROCEDURE — 93018 CV STRESS TEST I&R ONLY: CPT | Performed by: INTERNAL MEDICINE

## 2021-04-09 PROCEDURE — 93321 DOPPLER ECHO F-UP/LMTD STD: CPT | Mod: TC

## 2021-04-09 PROCEDURE — 93016 CV STRESS TEST SUPVJ ONLY: CPT | Performed by: INTERNAL MEDICINE

## 2021-04-09 RX ADMIN — HUMAN ALBUMIN MICROSPHERES AND PERFLUTREN 9 ML: 10; .22 INJECTION, SOLUTION INTRAVENOUS at 15:31

## 2021-04-17 ENCOUNTER — HEALTH MAINTENANCE LETTER (OUTPATIENT)
Age: 46
End: 2021-04-17

## 2021-07-16 ENCOUNTER — RECORDS - HEALTHEAST (OUTPATIENT)
Dept: ADMINISTRATIVE | Facility: CLINIC | Age: 46
End: 2021-07-16

## 2021-08-01 ENCOUNTER — HEALTH MAINTENANCE LETTER (OUTPATIENT)
Age: 46
End: 2021-08-01

## 2021-09-26 ENCOUNTER — HEALTH MAINTENANCE LETTER (OUTPATIENT)
Age: 46
End: 2021-09-26

## 2022-03-13 ENCOUNTER — HEALTH MAINTENANCE LETTER (OUTPATIENT)
Age: 47
End: 2022-03-13

## 2022-05-08 ENCOUNTER — HEALTH MAINTENANCE LETTER (OUTPATIENT)
Age: 47
End: 2022-05-08

## 2023-04-23 ENCOUNTER — HEALTH MAINTENANCE LETTER (OUTPATIENT)
Age: 48
End: 2023-04-23

## 2023-06-02 ENCOUNTER — HEALTH MAINTENANCE LETTER (OUTPATIENT)
Age: 48
End: 2023-06-02

## 2023-12-03 ENCOUNTER — HEALTH MAINTENANCE LETTER (OUTPATIENT)
Age: 48
End: 2023-12-03

## 2024-01-14 ENCOUNTER — OFFICE VISIT (OUTPATIENT)
Dept: URGENT CARE | Facility: URGENT CARE | Age: 49
End: 2024-01-14
Payer: COMMERCIAL

## 2024-01-14 VITALS
SYSTOLIC BLOOD PRESSURE: 136 MMHG | OXYGEN SATURATION: 95 % | DIASTOLIC BLOOD PRESSURE: 84 MMHG | WEIGHT: 180 LBS | RESPIRATION RATE: 20 BRPM | BODY MASS INDEX: 29.95 KG/M2 | TEMPERATURE: 97.2 F | HEART RATE: 95 BPM

## 2024-01-14 DIAGNOSIS — J45.21 MILD INTERMITTENT ASTHMA WITH EXACERBATION: ICD-10-CM

## 2024-01-14 DIAGNOSIS — J06.9 VIRAL URI WITH COUGH: Primary | ICD-10-CM

## 2024-01-14 PROBLEM — J45.909 ASTHMA: Status: ACTIVE | Noted: 2020-11-10

## 2024-01-14 PROCEDURE — 99204 OFFICE O/P NEW MOD 45 MIN: CPT

## 2024-01-14 RX ORDER — CLOPIDOGREL BISULFATE 75 MG/1
75 TABLET ORAL DAILY
COMMUNITY
End: 2024-06-19

## 2024-01-14 RX ORDER — ASPIRIN 81 MG
TABLET,CHEWABLE ORAL
COMMUNITY

## 2024-01-14 RX ORDER — BENZONATATE 200 MG/1
200 CAPSULE ORAL 3 TIMES DAILY PRN
Qty: 30 CAPSULE | Refills: 0 | Status: SHIPPED | OUTPATIENT
Start: 2024-01-14 | End: 2024-06-19

## 2024-01-14 RX ORDER — FLUTICASONE PROPIONATE 220 UG/1
1 AEROSOL, METERED RESPIRATORY (INHALATION) 2 TIMES DAILY
Qty: 30 G | Refills: 0 | Status: SHIPPED | OUTPATIENT
Start: 2024-01-14

## 2024-01-14 RX ORDER — METOPROLOL SUCCINATE 25 MG/1
25 TABLET, EXTENDED RELEASE ORAL DAILY
COMMUNITY

## 2024-01-14 RX ORDER — CHLORPROMAZINE HYDROCHLORIDE 25 MG/1
25 TABLET, FILM COATED ORAL
COMMUNITY
Start: 2023-12-17 | End: 2024-06-19

## 2024-01-14 RX ORDER — NITROGLYCERIN 0.4 MG/1
0.4 TABLET SUBLINGUAL
COMMUNITY
Start: 2022-11-23

## 2024-01-14 RX ORDER — ROSUVASTATIN CALCIUM 40 MG/1
40 TABLET, COATED ORAL DAILY
COMMUNITY
Start: 2023-04-19

## 2024-01-14 NOTE — PATIENT INSTRUCTIONS
"Diagnosis: viral URI_ upper respiratory infection    WITH cough   Today we did:  Exam - nothing dangerous   No concerns for bronchitis or pneumonia     Plan:   Fluids: you need to drink lots of fluids: water, electrolytes, broth    Rest: you need lots and lots of rest to get better, you have permission to sleep all day and stay home from work or school until you feel better   Treat the most bothersome symptoms.... see symptoms below.....   Monitor for:   Fever: >101 F that is not relieved w/ tylenol, worsening fevers   Difficulty breathing: shortness of breath, wheezing, chest pain with breathing   Signs of dehydration: Feeling weak, dizzy or that you might faint  Chest pain   You have been fighting this illness for >14 days and are not getting better           Cold and Flu     Unfortunately, <2% of sinus/throat/cough symptoms are caused by a bacteria   However, You are SICK! This is often miserable! And I feel for you!   We cannot make it go away, but lets work to shorten the duration and severity!   Your most bothersome symptom is often where the virus settled in your body:    Nose, throat, or lungs, it may cause cough, sore throat, congestion, runny nose, headache, earache or shortness of breath.   It can also settle in your stomach and cause nausea, vomiting, and diarrhea.   Sometimes it causes generalized symptoms like \"aching all over,\" feeling tired, loss of energy, or loss of appetite.  ------- This illness usually lasts anywhere from 10-14 days ----------- hang in there.         We Treat URIs by helping relieve symptoms     Symptoms: - what is your most bothersome symptom?   Nasal congestion:           Decongestants:                > Pseudoephedrine (Sudafed) 60mg every 4 hours (not before bedtime)      Children >4yrs old: 15mg every 4hours chew (1mg/kg every 6hrs)       Liquid: Children's Silfedrine: 15 mg/5 mL      children >2 years old Phenylephrine (sudafed PE child)             Antihistamines:    > " chlorpheniramine 4mg Q4hrs -or- clemastine 1mg twice a day (no more than 7 days)      Children >2yrs old: Claritin or zyrtec      >> add ibuprofen or tylenol for added effect   cough:          antitussives :: suppresses cough by topical anesthetic action on the respiratory stretch receptor    tessalon perles / Benzonatate - need prescription      >only in children >10yrs of age          Expectorants  ::increase respiratory tract clearance of mucus by adding hydration/viscosity causing thinning and loosening   Guaifenesin AND Dextromethorphan :: [adds cough Suppressant] inhibits cough reflex by decreasing cough receptors (relieves the irritating  dry cough)   Robitussin DM / Mucinex DM   Guaifenesin 200 to 400 mg // dextromethorphan 10 to 20 mg every 4 hours,   Combo tabs: 1-2 tabs every 12hrs         Children 4yr to <6 years: Dextromethorphan 2.5 to 5 mg with Guaifenesin 50 to 100 mg every  4 hours as needed  sore throat:   gargle saltwater, honey, warm fluids          cough drops or lozenges:    Cepacol Lozenge / Benzocaine     Children >5yrs old : one lozenge every 2 hours   Herbal:    - honey = good for cough suppressant    - zinc = 2-3mg lozenge Q2hrs    - menthol vapor rup on chest before sleep

## 2024-01-14 NOTE — PROGRESS NOTES
URGENT CARE  Assessment & Plan   Assessment:   Israel Brown is a 48 year old male who's clinical presentation today is consistent with:   1. Viral URI with cough  2. Asthma exacerbation   - fluticasone (FLOVENT HFA) 220 MCG/ACT inhaler;   - benzonatate (TESSALON) 200 MG capsule;   Plan:  Will treat patient with supportive and symptomatic measures for viral upper respiratory infection at this time which include: Fluids, rest, over-the-counter medications; cough suppressants, decongestants, antihistamines, antitussives and expectorants, side effects of medications reviewed, will also add an ICS inhaler given his history of asthma and suspect an acute asthma exacerbation; told him he can continue with his albuterol inhaler as needed   Patient is well appearing, afebrile, had reassuring vital signs and a benign physical exam. Given lung sounds are clear no concerns for bacterial lung pathology at this time and will encourage patient to continue to closely monitor symptoms  Educated patient to monitor their symptoms for worsening and/or no improvement and to follow up in the next 7-10 days    No alarm signs or symptoms present   Differential Diagnoses for this patient's chief complaint that I considered include:  Bacterial vs viral etiology of URI, Covid, influenza,} pharyngitis/tonsillitis, pneumonia, bronchitis, Common cold, allergic rhinitis, seasonal allergies, ABRS, viral sinusitis, cough, pertussis, Mononucleosis, tonsillitis, chronic sinusitis, meningococcal disease     Patient is} agreeable to treatment plan and state they will follow-up if symptoms do not improve and/or if symptoms worsen   see patient's AVS 'monitor for' section for specific patient instructions given and discussed regarding what to watch for and when to follow up    IFEANYI Prado [facility]      ______________________________________________________________________      Subjective     HPI: Israel  Wilver Brown  is a 48 year old  male who presents today for evaluation the following concerns:   Patient  endorses cold symptoms today which started a few  days ago   Patient reports coughing and feelings of breathlessness, says he has an albuterol inhaler but it hasn't been helping, patient endorses a history of asthma   Patient denies any fevers} sweats, chills, myalgias, wheezing,weakness or signs of dehydration   Patient declines wanting any swabbing or testing today      Review of Systems:  Pertinent review of systems as reflected in HPI, otherwise negative.     Objective    Physical Exam:  Vitals:    01/14/24 1444   BP: 136/84   Pulse: 95   Resp: 20   Temp: 97.2  F (36.2  C)   TempSrc: Tympanic   SpO2: 95%   Weight: 81.6 kg (180 lb)      General: Alert and oriented, no acute distress, Vital signs reviewed: afebrile,  normotensive   Psy/mental status: Cooperative, nonanxious  SKIN: Intact, no rashes  EYES: EOMs intact, PERRLA bilaterally   Conjunctiva: Clear bilaterally, no injection or erythema present  EARS: TMs intact, translucent gray in color with normal landmarks present no erythema  or bulging tympanic membrane   Canals are without swelling, however have a mild amount of cerumen, no impaction  NOSE:  mucosa moist               No frontal or maxillary sinus tenderness present bilaterally  MOUTH/THROAT: lips, tongue, & oral mucosa appear normal upon inspection                Posterior oropharynx is erythematous but without exudate, lesions or tonsillar  Edema, no dysphonia, no unilateral tonsillar edema, no uvular deviation,   no signs of peritonsillar abscess  NECK: supple, has full range of motion with no meningeal signs              No lymphadenopathy present  LUNG: normal work of breathing, good respiratory effort without retractions, good air  movement, non labored, inspection reveals normal chest expansion w/  inspiration            Lung sounds are clear to auscultation bilaterally,            No  rales/rhonic/crackles wheezing noted           cough noted  as dry and irritative, infrequent     ______________________________________________________________________    I explained my diagnostic considerations and recommendations to the patient, who voiced understanding and agreement with the treatment plan.   All questions were answered.   We discussed potential side effects, risks and benefits of any prescribed or recommended therapies, as well as expectations for response to treatments.  Please see AVS for any patient instructions & handouts given.   Patient was advised to contact the Nurse Care Line, their Primary Care provider, Urgent Care, or the Emergency Department if there are new or worsening symptoms, or call 911 for emergencies.

## 2024-02-12 ENCOUNTER — TRANSCRIBE ORDERS (OUTPATIENT)
Dept: OTHER | Age: 49
End: 2024-02-12

## 2024-02-12 DIAGNOSIS — Z79.4 TYPE 2 DIABETES MELLITUS WITH DIABETIC NEUROPATHY, WITH LONG-TERM CURRENT USE OF INSULIN (H): Primary | ICD-10-CM

## 2024-02-12 DIAGNOSIS — E11.40 TYPE 2 DIABETES MELLITUS WITH DIABETIC NEUROPATHY, WITH LONG-TERM CURRENT USE OF INSULIN (H): Primary | ICD-10-CM

## 2024-03-15 ENCOUNTER — OFFICE VISIT (OUTPATIENT)
Dept: OPHTHALMOLOGY | Facility: CLINIC | Age: 49
End: 2024-03-15
Payer: COMMERCIAL

## 2024-03-15 DIAGNOSIS — E08.3313 MODERATE NONPROLIFERATIVE DIABETIC RETINOPATHY OF BOTH EYES WITH MACULAR EDEMA ASSOCIATED WITH DIABETES MELLITUS DUE TO UNDERLYING CONDITION (H): Primary | ICD-10-CM

## 2024-03-15 DIAGNOSIS — H52.4 PRESBYOPIA: ICD-10-CM

## 2024-03-15 PROCEDURE — 92015 DETERMINE REFRACTIVE STATE: CPT | Performed by: OPTOMETRIST

## 2024-03-15 PROCEDURE — 92134 CPTRZ OPH DX IMG PST SGM RTA: CPT | Performed by: OPTOMETRIST

## 2024-03-15 PROCEDURE — 92004 COMPRE OPH EXAM NEW PT 1/>: CPT | Performed by: OPTOMETRIST

## 2024-03-15 ASSESSMENT — VISUAL ACUITY
METHOD: SNELLEN - LINEAR
OS_CC: 20/40
OD_CC: 20/50
CORRECTION_TYPE: GLASSES
OS_CC+: -2

## 2024-03-15 ASSESSMENT — REFRACTION_WEARINGRX
OD_AXIS: 175
OS_SPHERE: -2.25
OS_ADD: +2.00
OS_AXIS: 004
OD_CYLINDER: +1.00
OD_SPHERE: -1.75
SPECS_TYPE: PAL
OD_ADD: +2.00
OS_CYLINDER: +1.50

## 2024-03-15 ASSESSMENT — CUP TO DISC RATIO
OS_RATIO: 0.1
OD_RATIO: 0.1

## 2024-03-15 ASSESSMENT — REFRACTION_MANIFEST
OD_ADD: +2.00
OD_CYLINDER: +1.00
OS_AXIS: 160
OS_CYLINDER: +1.50
OD_SPHERE: -2.50
OS_SPHERE: -2.25
OD_AXIS: 175
OS_ADD: +2.00

## 2024-03-15 ASSESSMENT — CONF VISUAL FIELD
OS_SUPERIOR_TEMPORAL_RESTRICTION: 0
OS_NORMAL: 1
METHOD: COUNTING FINGERS
OS_INFERIOR_NASAL_RESTRICTION: 0
OS_INFERIOR_TEMPORAL_RESTRICTION: 0
OD_SUPERIOR_NASAL_RESTRICTION: 0
OS_SUPERIOR_NASAL_RESTRICTION: 0
OD_INFERIOR_NASAL_RESTRICTION: 0
OD_INFERIOR_TEMPORAL_RESTRICTION: 3
OD_SUPERIOR_TEMPORAL_RESTRICTION: 0

## 2024-03-15 ASSESSMENT — TONOMETRY
OD_IOP_MMHG: 12
IOP_METHOD: TONOPEN
OS_IOP_MMHG: 11

## 2024-03-15 ASSESSMENT — SLIT LAMP EXAM - LIDS
COMMENTS: NORMAL
COMMENTS: NORMAL

## 2024-03-15 ASSESSMENT — EXTERNAL EXAM - RIGHT EYE: OD_EXAM: NORMAL

## 2024-03-15 ASSESSMENT — EXTERNAL EXAM - LEFT EYE: OS_EXAM: NORMAL

## 2024-03-15 NOTE — NURSING NOTE
Chief Complaints and History of Present Illnesses   Patient presents with    Eye Exam For Diabetes     Chief Complaint(s) and History of Present Illness(es)       Eye Exam For Diabetes              Laterality: both eyes    Course: stable    Associated symptoms: Negative for eye pain, flashes and floaters    Treatments tried: no treatments              Comments    Israel Brown is a(n) 48 year old male who presents for a diabetic exam. Last eye exam was 2 year(s) ago. Since exam, vision is about the same. No lubricating drops. No flashes and floaters. No eye pain.     Last A1c: 15.2% in Oct 2023  Lab Results       Component                Value               Date                       A1C                      9.6                 01/24/2018                 A1C                      11.1                08/18/2017              Martinez Kenyon COT 12:39 PM March 15, 2024    No hx of amblyopia

## 2024-03-15 NOTE — PROGRESS NOTES
Assessment/Plan  (E08.4494) Moderate nonproliferative diabetic retinopathy of both eyes with macular edema associated with diabetes mellitus due to underlying condition (H)  (primary encounter diagnosis)  Comment: patient new to this clinic. No prior history of retinopathy per patient.   Plan: Discussed findings with patient. Encouraged improved glucose control. Will refer to retina clinic for evaluation and treatment of edema. Given level of retinopathy, baseline fluorescein angiogram may also be appropriate. Updated DMV form for patient as he does technically pass today. Advised patient that if diabetes is not better controlled that he risks permanently losing his vision.     (H52.4) Presbyopia  Plan: REFRACTION [[id number]]        Discussed findings with patient. New spectacle prescription dispensed to patient. Patient is welcome to return to clinic with prolonged adaptation difficulties.     Complete documentation of historical and exam elements from today's encounter can  be found in the full encounter summary report (not reduplicated in this progress  note). I personally obtained the chief complaint(s) and history of present illness. I  confirmed and edited as necessary the review of systems, past medical/surgical  history, family history, social history, and examination findings as documented by  others; and I examined the patient myself. I personally reviewed the relevant tests,  images, and reports as documented above. I formulated and edited as necessary the  assessment and plan and discussed the findings and management plan with the  patient and family.    Tio Hernandez OD

## 2024-04-02 DIAGNOSIS — E08.3313 MODERATE NONPROLIFERATIVE DIABETIC RETINOPATHY OF BOTH EYES WITH MACULAR EDEMA ASSOCIATED WITH DIABETES MELLITUS DUE TO UNDERLYING CONDITION (H): Primary | ICD-10-CM

## 2024-04-04 ENCOUNTER — OFFICE VISIT (OUTPATIENT)
Dept: ENDOCRINOLOGY | Facility: CLINIC | Age: 49
End: 2024-04-04
Attending: PHYSICIAN ASSISTANT
Payer: COMMERCIAL

## 2024-04-04 ENCOUNTER — TELEPHONE (OUTPATIENT)
Dept: ENDOCRINOLOGY | Facility: CLINIC | Age: 49
End: 2024-04-04

## 2024-04-04 VITALS
WEIGHT: 178 LBS | HEART RATE: 87 BPM | BODY MASS INDEX: 29.62 KG/M2 | OXYGEN SATURATION: 94 % | SYSTOLIC BLOOD PRESSURE: 143 MMHG | RESPIRATION RATE: 18 BRPM | DIASTOLIC BLOOD PRESSURE: 85 MMHG

## 2024-04-04 DIAGNOSIS — E11.40 TYPE 2 DIABETES MELLITUS WITH DIABETIC NEUROPATHY, WITH LONG-TERM CURRENT USE OF INSULIN (H): ICD-10-CM

## 2024-04-04 DIAGNOSIS — Z79.4 TYPE 2 DIABETES MELLITUS WITH DIABETIC NEUROPATHY, WITH LONG-TERM CURRENT USE OF INSULIN (H): ICD-10-CM

## 2024-04-04 PROBLEM — I21.4 NON-STEMI (NON-ST ELEVATED MYOCARDIAL INFARCTION) (H): Status: ACTIVE | Noted: 2022-11-20

## 2024-04-04 PROBLEM — K52.9 COLITIS: Status: ACTIVE | Noted: 2020-11-10

## 2024-04-04 PROBLEM — N17.9 AKI (ACUTE KIDNEY INJURY) (H): Status: ACTIVE | Noted: 2024-02-05

## 2024-04-04 PROBLEM — K21.9 GERD (GASTROESOPHAGEAL REFLUX DISEASE): Status: ACTIVE | Noted: 2024-04-04

## 2024-04-04 PROBLEM — I25.5 ISCHEMIC CARDIOMYOPATHY: Status: ACTIVE | Noted: 2023-04-15

## 2024-04-04 PROBLEM — R94.6 ABNORMAL THYROID FUNCTION TEST: Status: ACTIVE | Noted: 2019-10-10

## 2024-04-04 PROBLEM — I25.10 CORONARY ARTERY DISEASE INVOLVING NATIVE CORONARY ARTERY OF NATIVE HEART WITHOUT ANGINA PECTORIS: Status: ACTIVE | Noted: 2023-04-15

## 2024-04-04 PROBLEM — I34.0 MITRAL VALVE INSUFFICIENCY: Status: ACTIVE | Noted: 2022-05-21

## 2024-04-04 PROBLEM — E08.3293 MILD NONPROLIFERATIVE DIABETIC RETINOPATHY OF BOTH EYES WITHOUT MACULAR EDEMA ASSOCIATED WITH DIABETES MELLITUS DUE TO UNDERLYING CONDITION (H): Status: ACTIVE | Noted: 2021-12-07

## 2024-04-04 PROBLEM — E11.9 DIABETES MELLITUS, TYPE 2 (H): Status: ACTIVE | Noted: 2024-04-04

## 2024-04-04 LAB
ANION GAP SERPL CALCULATED.3IONS-SCNC: 14 MMOL/L (ref 7–15)
BUN SERPL-MCNC: 27 MG/DL (ref 6–20)
CALCIUM SERPL-MCNC: 9.2 MG/DL (ref 8.6–10)
CHLORIDE SERPL-SCNC: 102 MMOL/L (ref 98–107)
CHOLEST SERPL-MCNC: 146 MG/DL
CREAT SERPL-MCNC: 1.04 MG/DL (ref 0.67–1.17)
CREAT UR-MCNC: 73.6 MG/DL
DEPRECATED HCO3 PLAS-SCNC: 22 MMOL/L (ref 22–29)
EGFRCR SERPLBLD CKD-EPI 2021: 87 ML/MIN/1.73M2
FASTING STATUS PATIENT QL REPORTED: YES
GLUCOSE SERPL-MCNC: 206 MG/DL (ref 70–99)
HBA1C MFR BLD: 12.3 % (ref 0–5.6)
HBA1C MFR BLD: 12.7 %
HDLC SERPL-MCNC: 47 MG/DL
LDLC SERPL CALC-MCNC: 70 MG/DL
MICROALBUMIN UR-MCNC: 106 MG/L
MICROALBUMIN/CREAT UR: 144.02 MG/G CR (ref 0–17)
NONHDLC SERPL-MCNC: 99 MG/DL
POTASSIUM SERPL-SCNC: 4.1 MMOL/L (ref 3.4–5.3)
SODIUM SERPL-SCNC: 138 MMOL/L (ref 135–145)
TRIGL SERPL-MCNC: 144 MG/DL
TSH SERPL DL<=0.005 MIU/L-ACNC: 5.18 UIU/ML (ref 0.3–4.2)

## 2024-04-04 PROCEDURE — 36415 COLL VENOUS BLD VENIPUNCTURE: CPT | Performed by: PHYSICIAN ASSISTANT

## 2024-04-04 PROCEDURE — 83036 HEMOGLOBIN GLYCOSYLATED A1C: CPT | Performed by: PHYSICIAN ASSISTANT

## 2024-04-04 PROCEDURE — 99203 OFFICE O/P NEW LOW 30 MIN: CPT | Performed by: PHYSICIAN ASSISTANT

## 2024-04-04 PROCEDURE — 80061 LIPID PANEL: CPT | Performed by: PHYSICIAN ASSISTANT

## 2024-04-04 PROCEDURE — 80048 BASIC METABOLIC PNL TOTAL CA: CPT | Performed by: PHYSICIAN ASSISTANT

## 2024-04-04 PROCEDURE — 82043 UR ALBUMIN QUANTITATIVE: CPT | Performed by: PHYSICIAN ASSISTANT

## 2024-04-04 PROCEDURE — 82570 ASSAY OF URINE CREATININE: CPT | Performed by: PHYSICIAN ASSISTANT

## 2024-04-04 PROCEDURE — 84681 ASSAY OF C-PEPTIDE: CPT | Performed by: PHYSICIAN ASSISTANT

## 2024-04-04 PROCEDURE — 84443 ASSAY THYROID STIM HORMONE: CPT | Performed by: PHYSICIAN ASSISTANT

## 2024-04-04 RX ORDER — ACYCLOVIR 400 MG/1
TABLET ORAL
Qty: 1 EACH | Refills: 0 | Status: SHIPPED | OUTPATIENT
Start: 2024-04-04

## 2024-04-04 RX ORDER — ACYCLOVIR 400 MG/1
TABLET ORAL
Qty: 3 EACH | Refills: 5 | Status: SHIPPED | OUTPATIENT
Start: 2024-04-04 | End: 2024-09-22

## 2024-04-04 NOTE — TELEPHONE ENCOUNTER
Prior Authorization Specialty Medication Request    Medication/Dose:  Continuous Blood Gluc Sensor (DEXCOM G7 SENSOR) MISC     Diagnosis and ICD code (if different than what is on RX):  E11.40; Z79.4  New/renewal/insurance change PA/secondary ins. PA:  New  Previously Tried and Failed:  Upgrade from Dexcom G6        COVERMYMEDS:  KEY: [id number]  Patient's last name: RAQUEL  : 1975        Faxed to pharmacy liaisons @ [phone]

## 2024-04-04 NOTE — PROGRESS NOTES
Assessment/Plan :   Type 2 DM. Israel understands the importance of getting his blood sugars under better control. He feels like if he took the insulin daily, he would be able to get his blood sugars back down. We discussed changing the time of the injection or setting an alarm. He would like to start using the Dexcom G7 sensor. His wife uses it and it has really helped her. I will send in a new prescription today for a sensor and a . We also discussed the possibility that he may not be making insulin any longer. He is due for routine laboratory testing and I will order a c peptide, today. If he is no longer making insulin, we may discuss transitioning to an insulin pump. If he is still making insulin, we will look into options of starting a GLP1 medication. I will contact him with the results and we will follow-up in 4 mos.      I have independently reviewed and interpreted labs, imaging as indicated.      Chief complaint:  Israel is a 48 year old male seen in consultation at the request of Dr. Smith for Type 2 DM.    I have reviewed Care Everywhere including [facility], [facility], [facility], [facility], [facility] , , [place] lab reports, imaging reports and provider notes as indicated.      HISTORY OF PRESENT ILLNESS  Israel was diagnosed with diabetes in 2004. He was started on metformin at the time of diagnosis but the medication didn't agree with him. He states that he just never felt good when taking the medication. He was then started on insulin. He feels like insulin works well, if he can remember to take it. He currently works nights and he will often forget to take insulin when he gets home in the morning. If he remembers he takes Lantus 60 unit(s) daily along with Jardiance 10 mg daily. He has a prescription for meal time insulin but it always causes his blood sugars to drop too low, so he doesn't take it.     He has previous hospitalizations for DKA but I do  not see any history of hospitalization for hypoglycemia. He does not currently monitor his blood sugars. He states that he can feel when his blood sugars are too high or too low. He states that he was recently diagnosed with retinopathy and he has an appt with a retinologist on April 9th. He has noticed some blurred vision but he doesn't feel like it is too bad. He does complain of worsening peripheral neuropathy. He states that this is particularly bothersome at night or when he is trying to fall asleep.     His diabetes is complicated by CAD with a history of a non-STEMI, hypothyroidism, hyperlipidemia, HTN, GERD, KEKE, and ischemic cardiomyopathy. He currently takes lisinopril and he has a history of microalbuminuria.    Endocrine relevant labs are as follows:   Latest Reference Range & Units 04/04/24 07:16   Afinion Hemoglobin A1c POCT <=5.7 % 12.7 (H)   (H): Data is abnormally high    REVIEW OF SYSTEMS    Endocrine: positive for diabetes  Skin: negative  Eyes: positive for visual blurring, negative for, redness, tearing  Ears/Nose/Throat: negative  Respiratory: No shortness of breath, dyspnea on exertion, cough, or hemoptysis  Cardiovascular: negative for, chest pain, dyspnea on exertion, lower extremity edema, and exercise intolerance  Gastrointestinal: negative for, nausea, vomiting, constipation, and diarrhea  Genitourinary: negative for, nocturia, dysuria, frequency, and urgency  Musculoskeletal: negative for, muscular weakness, nocturnal cramping, and foot pain  Neurologic: positive for numbness or tingling of feet, negative for, local weakness, and numbness or tingling of hands  Psychiatric: negative  Hematologic/Lymphatic/Immunologic: negative    Past Medical History  Past Medical History:   Diagnosis Date    Asthma     Diabetes mellitus (H)     Neuropathy     RA (rheumatoid arthritis) (H)        Medications  Current Outpatient Medications   Medication Sig Dispense Refill    albuterol (PROAIR HFA,  PROVENTIL HFA, VENTOLIN HFA) 108 (90 BASE) MCG/ACT inhaler Inhale 2 puffs into the lungs every 6 hours      ASPIRIN LOW DOSE 81 MG chewable tablet CHEW AND SWALLOW ONE TABLET BY MOUTH EVERY DAY      benzonatate (TESSALON) 200 MG capsule Take 1 capsule (200 mg) by mouth 3 times daily as needed for cough 30 capsule 0    chlorproMAZINE (THORAZINE) 25 MG tablet Take 25 mg by mouth      clopidogrel (PLAVIX) 75 MG tablet Take 75 mg by mouth daily      dapagliflozin (FARXIGA) 10 MG TABS tablet Take 10 mg by mouth      empagliflozin (JARDIANCE) 25 MG TABS tablet Take 25 mg by mouth daily      fluticasone (FLONASE) 50 MCG/ACT spray Spray 1 spray in nostril daily      fluticasone (FLOVENT HFA) 220 MCG/ACT inhaler Inhale 1 puff into the lungs 2 times daily 30 g 0    fluticasone-salmeterol (ADVAIR) 250-50 MCG/DOSE inhaler Inhale 1 puff into the lungs 2 times daily 2 Inhaler 4    insulin aspart (NOVOLOG FLEXPEN) 100 UNIT/ML injection Inject Subcutaneous 3 times daily (with meals)      insulin glargine (LANTUS) 100 UNIT/ML injection Inject 60 Units Subcutaneous every morning      ipratropium - albuterol 0.5 mg/2.5 mg/3 mL (DUONEB) 0.5-2.5 (3) MG/3ML neb solution 3 mLs      levothyroxine (SYNTHROID/LEVOTHROID) 50 MCG tablet Take 50 mcg by mouth      lisinopril (PRINIVIL/ZESTRIL) 5 MG tablet Take 5 mg by mouth      methocarbamol (ROBAXIN) 500 MG tablet Take 500 mg by mouth      methylPREDNISolone (MEDROL DOSEPAK) 4 MG tablet therapy pack       metoprolol succinate ER (TOPROL XL) 25 MG 24 hr tablet Take 25 mg by mouth daily      montelukast (SINGULAIR) 10 MG tablet Take 10 mg by mouth At Bedtime      nitroGLYcerin (NITROSTAT) 0.4 MG sublingual tablet Place 0.4 mg under the tongue      omeprazole (PRILOSEC) 20 MG CR capsule Take 20 mg by mouth daily      omeprazole (PRILOSEC) 20 MG DR capsule Take 2 capsules (40 mg) by mouth daily 30 capsule 3    ondansetron (ZOFRAN ODT) 4 MG ODT tab Take 1 tablet (4 mg) by mouth every 8 hours as  needed for nausea 10 tablet 0    Pregabalin (LYRICA PO)       rosuvastatin (CRESTOR) 40 MG tablet Take 40 mg by mouth daily      simvastatin (ZOCOR) 40 MG tablet Take 40 mg by mouth At Bedtime         Allergies  Allergies   Allergen Reactions    Cortisone     Penicillins     Statins Other (See Comments)     Side effect: myalgias. Ok with zocor only         Family History  family history includes Seizure Disorder in his mother.    Social History  Social History     Tobacco Use    Smoking status: Never    Smokeless tobacco: Never    Tobacco comments:     NA   Substance Use Topics    Alcohol use: No    Drug use: No       Physical Exam  BP (!) 143/85 (BP Location: Left arm, Patient Position: Sitting, Cuff Size: Adult Regular)   Pulse 87   Resp 18   Wt 80.7 kg (178 lb)   SpO2 94%   BMI 29.62 kg/m    Body mass index is 29.62 kg/m .  GENERAL :  In no apparent distress  SKIN: Normal color, normal temperature, texture.  No hirsutism, alopecia or purple striae.     EYES: PERRL, EOMI, No scleral icterus,  No proptosis, conjunctival redness, stare, retraction  RESP: Lungs clear to auscultation bilaterally  CARDIAC: Regular rate and rhythm, normal S1 S2, without murmurs, rubs or gallops    NEURO: awake, alert, responds appropriately to questions.  Cranial nerves intact.   Moves all extremities; Gait normal.  No tremor of the outstretched hand.  EXTREMITIES: No clubbing, cyanosis or edema.    DATA REVIEW  He doesn't currently monitor his blood sugars

## 2024-04-04 NOTE — TELEPHONE ENCOUNTER
Prior Authorization Not Needed per Insurance    Medication: DEXCOM G7 SENSOR MISC  Insurance Company: [employer] - Phone [phone] Fax [phone]  Expected CoPay: $ 50.92  [facility]    Paid claim received with a $50.92 copay, no prior auth required.

## 2024-04-04 NOTE — NURSING NOTE
Israel Brown's goals for this visit include:   Chief Complaint   Patient presents with    New Patient     DM       He requests these members of his care team be copied on today's visit information: yes    PCP: Julio Cesar - JAKOB Álvarez [facility]    Referring Provider:  Salbador Smith PA-C  [address] [place] [address]    BP (!) 143/85 (BP Location: Left arm, Patient Position: Sitting, Cuff Size: Adult Regular)   Pulse 87   Resp 18   Wt 80.7 kg (178 lb)   SpO2 94%   BMI 29.62 kg/m      Do you need any medication refills at today's visit? None    Dimitri Mckee, EMT

## 2024-04-05 LAB — C PEPTIDE SERPL-MCNC: 1.7 NG/ML (ref 0.9–6.9)

## 2024-04-05 NOTE — TELEPHONE ENCOUNTER
Called [facility], they have the prescription ready in que to be filled on 4/13/24.    Naya Hess CMA  Adult Endocrinology  [facility] [place]

## 2024-04-08 DIAGNOSIS — Z79.4 TYPE 2 DIABETES MELLITUS WITH DIABETIC NEUROPATHY, WITH LONG-TERM CURRENT USE OF INSULIN (H): Primary | ICD-10-CM

## 2024-04-08 DIAGNOSIS — E11.40 TYPE 2 DIABETES MELLITUS WITH DIABETIC NEUROPATHY, WITH LONG-TERM CURRENT USE OF INSULIN (H): Primary | ICD-10-CM

## 2024-04-09 ENCOUNTER — OFFICE VISIT (OUTPATIENT)
Dept: OPHTHALMOLOGY | Facility: CLINIC | Age: 49
End: 2024-04-09
Attending: OPTOMETRIST
Payer: COMMERCIAL

## 2024-04-09 DIAGNOSIS — H25.13 NUCLEAR SENILE CATARACT OF BOTH EYES: ICD-10-CM

## 2024-04-09 DIAGNOSIS — E08.3512 PROLIFERATIVE DIABETIC RETINOPATHY OF LEFT EYE WITH MACULAR EDEMA ASSOCIATED WITH DIABETES MELLITUS DUE TO UNDERLYING CONDITION (H): Primary | ICD-10-CM

## 2024-04-09 DIAGNOSIS — E11.3491: ICD-10-CM

## 2024-04-09 PROCEDURE — 92250 FUNDUS PHOTOGRAPHY W/I&R: CPT | Performed by: OPHTHALMOLOGY

## 2024-04-09 PROCEDURE — 99207 FUNDUS PHOTOS OU (BOTH EYES): CPT | Mod: 26 | Performed by: OPHTHALMOLOGY

## 2024-04-09 PROCEDURE — 99204 OFFICE O/P NEW MOD 45 MIN: CPT | Performed by: OPHTHALMOLOGY

## 2024-04-09 PROCEDURE — 92235 FLUORESCEIN ANGRPH MLTIFRAME: CPT | Performed by: OPHTHALMOLOGY

## 2024-04-09 PROCEDURE — 92134 CPTRZ OPH DX IMG PST SGM RTA: CPT | Performed by: OPHTHALMOLOGY

## 2024-04-09 PROCEDURE — 99211 OFF/OP EST MAY X REQ PHY/QHP: CPT | Mod: 25 | Performed by: OPHTHALMOLOGY

## 2024-04-09 ASSESSMENT — VISUAL ACUITY
METHOD: SNELLEN - LINEAR
CORRECTION_TYPE: GLASSES
OS_CC+: -1
OD_CC+: -2
OD_CC: 20/40
OS_CC: 20/50

## 2024-04-09 ASSESSMENT — CONF VISUAL FIELD
OD_INFERIOR_TEMPORAL_RESTRICTION: 3
OS_SUPERIOR_TEMPORAL_RESTRICTION: 0
OS_SUPERIOR_NASAL_RESTRICTION: 0
METHOD: COUNTING FINGERS
OS_INFERIOR_NASAL_RESTRICTION: 0
OS_NORMAL: 1
OS_INFERIOR_TEMPORAL_RESTRICTION: 0

## 2024-04-09 ASSESSMENT — REFRACTION_WEARINGRX
OD_ADD: +2.00
SPECS_TYPE: PAL
OS_AXIS: 004
OS_SPHERE: -2.25
OS_CYLINDER: +1.50
OD_SPHERE: -1.75
OS_ADD: +2.00
OD_CYLINDER: +1.00
OD_AXIS: 175

## 2024-04-09 ASSESSMENT — TONOMETRY
OD_IOP_MMHG: 9
IOP_METHOD: TONOPEN
OS_IOP_MMHG: 8

## 2024-04-09 NOTE — PROGRESS NOTES
CC -   retinal evaluation for diabetic retinopathy    INTERVAL HISTORY - Initial visit    HPI -   Israel Brown is a  48 year old year-old patient with DM II x 20 yrs on insulin and oral meds is referred by Dr. Hernandez for retinal evaluation. Blurry vision when blood sugar is high.    Lab Results   Component Value Date    A1C 12.3 04/04/2024    A1C 12.7 04/04/2024    A1C 9.6 01/24/2018    A1C 11.1 08/18/2017     RETINAL IMAGING:  OCT 4/9/24  OD - minimal parafoveal IRF; foveal depression preserved  OS - center involving DME    FA 4/9/24: ou diffuse retinal ischemia and MA  in all four quadrants; left eye small NVE    ASSESSMENT & PLAN    1. Proliferative diabetic retinopathy of left eye with macular edema associated with diabetes mellitus due to underlying condition (H)    2. Very severe nonproliferative diabetic retinopathy of right eye (H)    3. Nuclear senile cataract of both eyes : observe     Poorly controlled DM II  Each eye severely ischemic: will do PRP ou  Left eye early NVE (not high risk configuration) + DME: intravitreal avastin left eye   Will come next week for left eye intravitreal avastin and ou PRP      Complete documentation of historical and exam elements from today's encounter can be found in the full encounter summary report (not reduplicated in this progress note). I personally obtained the chief complaint(s) and history of present illness.  I confirmed and edited as necessary the review of systems, past medical/surgical history, family history, social history, and examination findings as documented by others; and I examined the patient myself. I personally reviewed the relevant tests, images, and reports as documented above. I formulated and edited as necessary the assessment and plan and discussed the findings and management plan with the patient and family.     Edvin Diamond MD, PhD

## 2024-04-17 ENCOUNTER — OFFICE VISIT (OUTPATIENT)
Dept: OPHTHALMOLOGY | Facility: CLINIC | Age: 49
End: 2024-04-17
Attending: OPHTHALMOLOGY
Payer: COMMERCIAL

## 2024-04-17 DIAGNOSIS — E11.3491: ICD-10-CM

## 2024-04-17 DIAGNOSIS — H25.13 NUCLEAR SENILE CATARACT OF BOTH EYES: ICD-10-CM

## 2024-04-17 DIAGNOSIS — E11.3512 PROLIFERATIVE DIABETIC RETINOPATHY OF LEFT EYE WITH MACULAR EDEMA ASSOCIATED WITH TYPE 2 DIABETES MELLITUS (H): Primary | ICD-10-CM

## 2024-04-17 PROCEDURE — 250N000011 HC RX IP 250 OP 636: Performed by: OPHTHALMOLOGY

## 2024-04-17 PROCEDURE — 67028 INJECTION EYE DRUG: CPT | Mod: LT | Performed by: OPHTHALMOLOGY

## 2024-04-17 PROCEDURE — 67228 TREATMENT X10SV RETINOPATHY: CPT | Mod: RT | Performed by: OPHTHALMOLOGY

## 2024-04-17 RX ADMIN — Medication 1.25 MG: at 15:19

## 2024-04-17 ASSESSMENT — SLIT LAMP EXAM - LIDS
COMMENTS: NORMAL
COMMENTS: NORMAL

## 2024-04-17 ASSESSMENT — REFRACTION_WEARINGRX
SPECS_TYPE: PAL
OD_AXIS: 175
OS_SPHERE: -2.25
OD_SPHERE: -1.75
OS_AXIS: 004
OD_CYLINDER: +1.00
OS_ADD: +2.00
OD_ADD: +2.00
OS_CYLINDER: +1.50

## 2024-04-17 ASSESSMENT — VISUAL ACUITY
OD_CC: 20/40
CORRECTION_TYPE: GLASSES
METHOD: SNELLEN - LINEAR
OS_CC: 20/50
OD_CC+: -1

## 2024-04-17 ASSESSMENT — TONOMETRY
IOP_METHOD: TONOPEN
OS_IOP_MMHG: 14
OD_IOP_MMHG: 13

## 2024-04-17 ASSESSMENT — EXTERNAL EXAM - LEFT EYE: OS_EXAM: NORMAL

## 2024-04-17 ASSESSMENT — EXTERNAL EXAM - RIGHT EYE: OD_EXAM: NORMAL

## 2024-04-17 ASSESSMENT — CUP TO DISC RATIO
OS_RATIO: 0.1
OD_RATIO: 0.1

## 2024-04-17 NOTE — NURSING NOTE
Chief Complaints and History of Present Illnesses   Patient presents with    Follow Up     Moderate nonproliferative diabetic retinopathy of both eyes with macular edema associated with diabetes mellitus due to underlying condition     Chief Complaint(s) and History of Present Illness(es)       Follow Up              Laterality: both eyes    Course: stable    Associated symptoms: eye pain.  Negative for dryness, flashes and floaters    Treatments tried: no treatments    Pain scale: 3/10    Comments: Moderate nonproliferative diabetic retinopathy of both eyes with macular edema associated with diabetes mellitus due to underlying condition              Comments    He states that his vision has seemed stable in the past week, since his last exam.  He struggles to read small print.  His vision varies with blood sugar levels.  Current BS is 267.      Lab Results       Component                Value               Date                       A1C                      12.3                04/04/2024                 A1C                      12.7                04/04/2024              Juanita Cooper, COT 1:54 PM  April 17, 2024

## 2024-04-17 NOTE — PROGRESS NOTES
CC -   retinal evaluation for diabetic retinopathy    INTERVAL HISTORY - Initial visit    HPI -   Israel Brown is a  48 year old year-old patient with DM II x 20 yrs on insulin and oral meds is referred by Dr. Hernandez for retinal evaluation. Blurry vision when blood sugar is high.    Lab Results   Component Value Date    A1C 12.3 04/04/2024    A1C 12.7 04/04/2024    A1C 9.6 01/24/2018    A1C 11.1 08/18/2017     RETINAL IMAGING:  OCT 4/9/24  OD - minimal parafoveal IRF; foveal depression preserved  OS - center involving DME    FA 4/9/24: ou diffuse retinal ischemia and MA  in all four quadrants; left eye small NVE    ASSESSMENT & PLAN    1. Proliferative diabetic retinopathy of left eye with macular edema associated with diabetes mellitus type II   2. Very severe nonproliferative diabetic retinopathy of right eye (H)    3. Nuclear senile cataract of both eyes : observe     Poorly controlled DM II  Each eye severely ischemic: will do PRP ou  Left eye early NVE (not high risk configuration) + DME: intravitreal avastin left eye     4/17/24: left eye intravitreal avastin and right eye PRP      RTCF 1-2 w for left eye PRP and right eye intravitreal avastin  and then Follow-up in 4-6 from the first injection    Complete documentation of historical and exam elements from today's encounter can be found in the full encounter summary report (not reduplicated in this progress note). I personally obtained the chief complaint(s) and history of present illness.  I confirmed and edited as necessary the review of systems, past medical/surgical history, family history, social history, and examination findings as documented by others; and I examined the patient myself. I personally reviewed the relevant tests, images, and reports as documented above. I formulated and edited as necessary the assessment and plan and discussed the findings and management plan with the patient and family.     Edvin Diamond MD, PhD

## 2024-05-01 ENCOUNTER — OFFICE VISIT (OUTPATIENT)
Dept: OPHTHALMOLOGY | Facility: CLINIC | Age: 49
End: 2024-05-01
Attending: OPHTHALMOLOGY
Payer: COMMERCIAL

## 2024-05-01 DIAGNOSIS — E08.3313 MODERATE NONPROLIFERATIVE DIABETIC RETINOPATHY OF BOTH EYES WITH MACULAR EDEMA ASSOCIATED WITH DIABETES MELLITUS DUE TO UNDERLYING CONDITION (H): Primary | ICD-10-CM

## 2024-05-01 PROCEDURE — 250N000011 HC RX IP 250 OP 636: Performed by: OPHTHALMOLOGY

## 2024-05-01 PROCEDURE — 67228 TREATMENT X10SV RETINOPATHY: CPT | Mod: LT | Performed by: OPHTHALMOLOGY

## 2024-05-01 PROCEDURE — 67028 INJECTION EYE DRUG: CPT | Mod: RT | Performed by: OPHTHALMOLOGY

## 2024-05-01 RX ADMIN — Medication 1.25 MG: at 14:33

## 2024-05-01 ASSESSMENT — REFRACTION_WEARINGRX
OD_CYLINDER: +1.00
SPECS_TYPE: PAL
OD_ADD: +2.00
OS_AXIS: 004
OS_ADD: +2.00
OD_SPHERE: -1.75
OS_CYLINDER: +1.50
OS_SPHERE: -2.25
OD_AXIS: 175

## 2024-05-01 ASSESSMENT — VISUAL ACUITY
CORRECTION_TYPE: GLASSES
OS_CC: 20/40
OD_CC+: +1
OD_CC: 20/40
METHOD: SNELLEN - LINEAR
OS_CC+: -1+2

## 2024-05-01 ASSESSMENT — TONOMETRY
OD_IOP_MMHG: 6
IOP_METHOD: TONOPEN
OS_IOP_MMHG: 8

## 2024-05-01 NOTE — NURSING NOTE
Chief Complaints and History of Present Illnesses   Patient presents with    Follow Up     2 week follow up Proliferative diabetic retinopathy of left eye     Chief Complaint(s) and History of Present Illness(es)       Follow Up              Associated symptoms: Negative for eye pain, flashes and floaters    Treatments tried: no treatments    Pain scale: 0/10    Comments: 2 week follow up Proliferative diabetic retinopathy of left eye              Comments    Pt states vision has improved since last visit both eyes.   No eye pain or irritation lately.   Ocular meds: None  No new concerns.    DM2  LBS : 129 at 2:17pm  Lab Results       Component                Value               Date                       A1C                      12.3                04/04/2024                 A1C                      12.7                04/04/2024                 A1C                      9.6                 01/24/2018                 A1C                      11.1                08/18/2017               Idris Ho 2:19 PM May 1, 2024

## 2024-05-01 NOTE — PROGRESS NOTES
Here for PRP left eye and intravitreal avastin right eye      CC -   Follow up diabetic retinopathy    INTERVAL HISTORY - Today patient reports that vision has remained stable.    HPI -   Israel Brown is a  48 year old year-old patient with DM II x 20 yrs on insulin and oral meds is referred by Dr. Hernandez for retinal evaluation. Blurry vision when blood sugar is high.    Lab Results   Component Value Date    A1C 12.3 04/04/2024    A1C 12.7 04/04/2024    A1C 9.6 01/24/2018    A1C 11.1 08/18/2017     RETINAL IMAGING:  OCT 4/9/24  OD - minimal parafoveal IRF; foveal depression preserved  OS - center involving DME    FA 4/9/24: ou diffuse retinal ischemia and MA  in all four quadrants; left eye small NVE    ASSESSMENT & PLAN    1. Proliferative diabetic retinopathy of left eye with macular edema associated with diabetes mellitus due to underlying condition (H)    2. Very severe nonproliferative diabetic retinopathy of right eye (H)    3. Nuclear senile cataract of both eyes : observe     Poorly controlled DM II  Each eye severely ischemic: PRP left eye today  Left eye early NVE (not high risk configuration) + DME: intravitreal avastin left eye     4/17/24: left eye intravitreal avastin and right eye PRP  05/01/24 : right eye intravitreal avastin and left eye  spots    RTCF 1 month,DFE each eye, possible avastin, possible fill in PRP    Complete documentation of historical and exam elements from today's encounter can be found in the full encounter summary report (not reduplicated in this progress note). I personally obtained the chief complaint(s) and history of present illness.  I confirmed and edited as necessary the review of systems, past medical/surgical history, family history, social history, and examination findings as documented by others; and I examined the patient myself. I personally reviewed the relevant tests, images, and reports as documented above. I formulated and edited as necessary the  assessment and plan and discussed the findings and management plan with the patient and family.     Edvin Diamond MD, PhD

## 2024-05-21 DIAGNOSIS — E08.3313 MODERATE NONPROLIFERATIVE DIABETIC RETINOPATHY OF BOTH EYES WITH MACULAR EDEMA ASSOCIATED WITH DIABETES MELLITUS DUE TO UNDERLYING CONDITION (H): Primary | ICD-10-CM

## 2024-06-05 ENCOUNTER — OFFICE VISIT (OUTPATIENT)
Dept: OPHTHALMOLOGY | Facility: CLINIC | Age: 49
End: 2024-06-05
Attending: OPHTHALMOLOGY
Payer: COMMERCIAL

## 2024-06-05 DIAGNOSIS — E08.3313 MODERATE NONPROLIFERATIVE DIABETIC RETINOPATHY OF BOTH EYES WITH MACULAR EDEMA ASSOCIATED WITH DIABETES MELLITUS DUE TO UNDERLYING CONDITION (H): ICD-10-CM

## 2024-06-05 DIAGNOSIS — E11.3491: Primary | ICD-10-CM

## 2024-06-05 DIAGNOSIS — E11.3512 PROLIFERATIVE DIABETIC RETINOPATHY OF LEFT EYE WITH MACULAR EDEMA ASSOCIATED WITH TYPE 2 DIABETES MELLITUS (H): ICD-10-CM

## 2024-06-05 PROCEDURE — 67028 INJECTION EYE DRUG: CPT | Mod: 50 | Performed by: OPHTHALMOLOGY

## 2024-06-05 PROCEDURE — 67028 INJECTION EYE DRUG: CPT | Mod: RT | Performed by: OPHTHALMOLOGY

## 2024-06-05 PROCEDURE — 92134 CPTRZ OPH DX IMG PST SGM RTA: CPT | Performed by: OPHTHALMOLOGY

## 2024-06-05 PROCEDURE — 99214 OFFICE O/P EST MOD 30 MIN: CPT | Mod: 25 | Performed by: OPHTHALMOLOGY

## 2024-06-05 PROCEDURE — 250N000011 HC RX IP 250 OP 636: Performed by: OPHTHALMOLOGY

## 2024-06-05 PROCEDURE — 99213 OFFICE O/P EST LOW 20 MIN: CPT | Mod: 25 | Performed by: OPHTHALMOLOGY

## 2024-06-05 RX ADMIN — Medication 1.25 MG: at 15:44

## 2024-06-05 ASSESSMENT — REFRACTION_WEARINGRX
OD_ADD: +2.00
OS_CYLINDER: +1.50
OS_SPHERE: -2.25
OS_AXIS: 004
SPECS_TYPE: PAL
OD_SPHERE: -1.75
OD_AXIS: 175
OS_ADD: +2.00
OD_CYLINDER: +1.00

## 2024-06-05 ASSESSMENT — CONF VISUAL FIELD
METHOD: COUNTING FINGERS
OS_NORMAL: 1
OD_SUPERIOR_NASAL_RESTRICTION: 0
OS_INFERIOR_TEMPORAL_RESTRICTION: 0
OD_INFERIOR_TEMPORAL_RESTRICTION: 0
OS_SUPERIOR_NASAL_RESTRICTION: 0
OS_INFERIOR_NASAL_RESTRICTION: 0
OD_INFERIOR_NASAL_RESTRICTION: 3
OS_SUPERIOR_TEMPORAL_RESTRICTION: 0
OD_SUPERIOR_TEMPORAL_RESTRICTION: 0

## 2024-06-05 ASSESSMENT — VISUAL ACUITY
CORRECTION_TYPE: GLASSES
METHOD: SNELLEN - LINEAR
OS_CC: 20/50
OD_CC+: -1
OD_CC: 20/30

## 2024-06-05 ASSESSMENT — EXTERNAL EXAM - RIGHT EYE: OD_EXAM: NORMAL

## 2024-06-05 ASSESSMENT — TONOMETRY
IOP_METHOD: TONOPEN
OS_IOP_MMHG: 12
OD_IOP_MMHG: 12

## 2024-06-05 ASSESSMENT — CUP TO DISC RATIO
OS_RATIO: 0.1
OD_RATIO: 0.1

## 2024-06-05 ASSESSMENT — SLIT LAMP EXAM - LIDS
COMMENTS: NORMAL
COMMENTS: NORMAL

## 2024-06-05 ASSESSMENT — EXTERNAL EXAM - LEFT EYE: OS_EXAM: NORMAL

## 2024-06-05 NOTE — NURSING NOTE
Chief Complaints and History of Present Illnesses   Patient presents with    Diabetic Retinopathy Follow Up     5 week follow up both eyes.     Chief Complaint(s) and History of Present Illness(es)       Diabetic Retinopathy Follow Up              Comments: 5 week follow up both eyes.              Comments    Pt states vision is the same as last visit. No eye pain today. No new flashes or floaters.  DM2 BS: Pt does not check sugars daily.  Lab Results       Component                Value               Date                       A1C                      12.3                04/04/2024                 A1C                      12.7                04/04/2024                 A1C                      9.6                 01/24/2018                 A1C                      11.1                08/18/2017              KEVON Marshall June 5, 2024 1:25 PM

## 2024-06-05 NOTE — PROGRESS NOTES
CC -   Follow up diabetic retinopathy    INTERVAL HISTORY - Patient reports that vision has remained stable, no pain, no flashes or floaters.    HPI -   Israel Brown is a  48 year old year-old patient with DM II x 20 yrs on insulin and oral meds is referred by Dr. Hernandez for retinal evaluation. Blurry vision when blood sugar is high.    Lab Results   Component Value Date    A1C 12.3 04/04/2024    A1C 12.7 04/04/2024    A1C 9.6 01/24/2018    A1C 11.1 08/18/2017         RETINAL IMAGING:  OCT 06/05/24   OD - minimal parafoveal IRF; foveal depression preserved  OS - center involving DME with perifoveal IRF     FA 4/9/24: ou diffuse retinal ischemia and MA  in all four quadrants; left eye small NVE    ASSESSMENT & PLAN    1. Proliferative diabetic retinopathy of left eye with macular edema associated with diabetes mellitus due to underlying condition (H)    2. Very severe nonproliferative diabetic retinopathy of right eye (H)    3. Nuclear senile cataract of both eyes : observe     Poorly controlled DM II  Each eye severely ischemic: PRP left eye today  Left eye early NVE (not high risk configuration) + DME     05/02/24 : right eye intravitreal avastin and left eye  spots  5/17/24: left eye intravitreal avastin and right eye  spots  06/05/24: Avastin each eye     RTCF 1- 2 months,DFE each eye, OCT ou, possible avastin    Wallace Randall MD  PGY-5 Vitreo-retina surgery Fellow  Department of Ophthalmology   [facility]    Complete documentation of historical and exam elements from today's encounter can be found in the full encounter summary report (not reduplicated in this progress note). I personally obtained the chief complaint(s) and history of present illness.  I confirmed and edited as necessary the review of systems, past medical/surgical history, family history, social history, and examination findings as documented by others; and I examined the patient myself. I personally  reviewed the relevant tests, images, and reports as documented above. I formulated and edited as necessary the assessment and plan and discussed the findings and management plan with the patient and family.     Edvin Diamond MD, PhD

## 2024-06-19 ENCOUNTER — OFFICE VISIT (OUTPATIENT)
Dept: FAMILY MEDICINE | Facility: CLINIC | Age: 49
End: 2024-06-19
Payer: COMMERCIAL

## 2024-06-19 ENCOUNTER — TELEPHONE (OUTPATIENT)
Dept: CARDIOLOGY | Facility: CLINIC | Age: 49
End: 2024-06-19

## 2024-06-19 VITALS
HEART RATE: 87 BPM | OXYGEN SATURATION: 93 % | DIASTOLIC BLOOD PRESSURE: 88 MMHG | WEIGHT: 187 LBS | TEMPERATURE: 98.6 F | HEIGHT: 66 IN | SYSTOLIC BLOOD PRESSURE: 127 MMHG | BODY MASS INDEX: 30.05 KG/M2

## 2024-06-19 DIAGNOSIS — K21.9 GASTROESOPHAGEAL REFLUX DISEASE, UNSPECIFIED WHETHER ESOPHAGITIS PRESENT: ICD-10-CM

## 2024-06-19 DIAGNOSIS — E11.40 TYPE 2 DIABETES MELLITUS WITH DIABETIC NEUROPATHY, WITH LONG-TERM CURRENT USE OF INSULIN (H): Primary | ICD-10-CM

## 2024-06-19 DIAGNOSIS — I25.10 CORONARY ARTERY DISEASE INVOLVING NATIVE CORONARY ARTERY OF NATIVE HEART WITHOUT ANGINA PECTORIS: ICD-10-CM

## 2024-06-19 DIAGNOSIS — J45.20 MILD INTERMITTENT ASTHMA, UNSPECIFIED WHETHER COMPLICATED: ICD-10-CM

## 2024-06-19 DIAGNOSIS — I10 HYPERTENSION, UNSPECIFIED TYPE: ICD-10-CM

## 2024-06-19 DIAGNOSIS — R79.89 ELEVATED TSH: ICD-10-CM

## 2024-06-19 DIAGNOSIS — Z79.4 TYPE 2 DIABETES MELLITUS WITH DIABETIC NEUROPATHY, WITH LONG-TERM CURRENT USE OF INSULIN (H): Primary | ICD-10-CM

## 2024-06-19 PROCEDURE — 36415 COLL VENOUS BLD VENIPUNCTURE: CPT | Performed by: NURSE PRACTITIONER

## 2024-06-19 PROCEDURE — 99207 PR FOOT EXAM NO CHARGE: CPT | Performed by: NURSE PRACTITIONER

## 2024-06-19 PROCEDURE — 84439 ASSAY OF FREE THYROXINE: CPT | Performed by: NURSE PRACTITIONER

## 2024-06-19 PROCEDURE — 99214 OFFICE O/P EST MOD 30 MIN: CPT | Performed by: NURSE PRACTITIONER

## 2024-06-19 PROCEDURE — G2211 COMPLEX E/M VISIT ADD ON: HCPCS | Performed by: NURSE PRACTITIONER

## 2024-06-19 RX ORDER — GABAPENTIN 100 MG/1
100 CAPSULE ORAL 3 TIMES DAILY
Qty: 90 CAPSULE | Refills: 0 | Status: SHIPPED | OUTPATIENT
Start: 2024-06-19 | End: 2024-08-21

## 2024-06-19 RX ORDER — CLOPIDOGREL BISULFATE 75 MG/1
75 TABLET ORAL DAILY
Qty: 60 TABLET | Refills: 0 | Status: SHIPPED | OUTPATIENT
Start: 2024-06-19 | End: 2024-09-16

## 2024-06-19 RX ORDER — FUROSEMIDE 20 MG
20 TABLET ORAL DAILY
COMMUNITY
Start: 2024-06-11

## 2024-06-19 RX ORDER — ROSUVASTATIN CALCIUM 20 MG/1
20 TABLET, COATED ORAL AT BEDTIME
COMMUNITY
Start: 2024-06-07

## 2024-06-19 RX ORDER — INSULIN GLARGINE-YFGN 100 [IU]/ML
60 INJECTION, SOLUTION SUBCUTANEOUS DAILY
COMMUNITY
Start: 2024-06-05

## 2024-06-19 ASSESSMENT — ASTHMA QUESTIONNAIRES
QUESTION_1 LAST FOUR WEEKS HOW MUCH OF THE TIME DID YOUR ASTHMA KEEP YOU FROM GETTING AS MUCH DONE AT WORK, SCHOOL OR AT HOME: NONE OF THE TIME
QUESTION_3 LAST FOUR WEEKS HOW OFTEN DID YOUR ASTHMA SYMPTOMS (WHEEZING, COUGHING, SHORTNESS OF BREATH, CHEST TIGHTNESS OR PAIN) WAKE YOU UP AT NIGHT OR EARLIER THAN USUAL IN THE MORNING: NOT AT ALL
ACT_TOTALSCORE: 24
QUESTION_2 LAST FOUR WEEKS HOW OFTEN HAVE YOU HAD SHORTNESS OF BREATH: NOT AT ALL
QUESTION_5 LAST FOUR WEEKS HOW WOULD YOU RATE YOUR ASTHMA CONTROL: WELL CONTROLLED
ACT_TOTALSCORE: 24
QUESTION_4 LAST FOUR WEEKS HOW OFTEN HAVE YOU USED YOUR RESCUE INHALER OR NEBULIZER MEDICATION (SUCH AS ALBUTEROL): NOT AT ALL

## 2024-06-19 NOTE — COMMUNITY RESOURCES LIST (ENGLISH)
June 19, 2024           YOUR PERSONALIZED LIST OF SERVICES & PROGRAMS           & SHELTER    Housing      [facility] - Hotline - Housing crisis  [address] [place] [address] (Distance: 11.3 miles)  Phone: [phone]  Language: English      Free - Client Services  [address] [place] [address] (Distance: 9.7 miles)  Phone: [phone]  Website: [email or web address]MashWorx/  Language: English  Fee: Free  Transportation Options: Free transportation      Home Health Care [facility] - [facility]  Phone: [phone]  Website: https://www.Perf[employer] HealthcareAppetite+/  Language: English, Hmong, Oromo, Gibraltarian, Icelandic  Hours: Mon 9:00 AM - 5:00 PM Tue 9:00 AM - 5:00 PM Wed 9:00 AM - 5:00 PM Thu 9:00 AM - 5:00 PM Fri 9:00 AM - 5:00 PM  Fee: Insurance  Accessibility: Blind accommodation, Deaf or hard of hearing, Translation services  Transportation Options: Free transportation    Case Management      [facility] - Housing Stabilization  [address] [place] [address] (Distance: 7.0 miles)  Phone: [phone]  Language: English  Fee: Self pay, Insurance  Accessibility: Translation services      [facility] ([facility] - ACCAP Rental Housing  [address] [place] [address] (Distance: 4.0 miles)  Phone: [phone]  Language: English  Fee: Free  Accessibility: Ada accessible, Blind accommodation, Deaf or hard of hearing      [employer] - Housing Stabilization Services  Phone: [phone]) [phone]  Website: https://homebasemn.com/  Language: English  Hours: Mon 8:00 AM - 4:00 PM Tue 8:00 AM - 4:00 PM Wed 8:00 AM - 4:00 PM Thu 8:00 AM - 4:00 PM Fri 8:00 AM - 4:00 PM  Fee: Free  Accessibility: Blind accommodation, Deaf or hard of hearing  Transportation Options: Free transportation    Drop-In Services      [facility] Warming or cooling center  [address] [place] [address] (Distance: 2.4 miles)  Language: Setswana,  English, Yolanda, Hmong, Belgian, Malian, Tamil  Fee: Free      [facility] - Project Recovery  [address] [facility] [address] [place] [address] (Distance: 10.4 miles)  Phone: [phone]) [phone]  Language: English  Fee: Free      [facility] POSTAL SERVICE - MAIL SERVICE FOR THE HOMELESS  Phone: [phone]) [phone]  Website: https://www.JEDI MIND               IMPORTANT NUMBERS & WEBSITES        Emergency Services  911  .   United Way  211 http://211unitedway.org  .   Poison Control  (929) 210-8546 http://[place]poison.org http://wisconsinpoison.org  .     Suicide and Crisis Lifeline  988 http://988Marquiss Wind Powerline.org  .   Childhelp [place] Child Abuse Hotline  214.404.3058 http://Childhelphotline.org   .   [place] Sexual Assault Hotline  (638) 292-9463 (HOPE) http://Museum [employer].Pipelinefx   .     [place] Runaway Safeline  (982) 284-5951 (RUNAWAY) http://Fundbox.Pipelinefx  .   Pregnancy & Postpartum Support  Call/text 305-714-3778  [place]: http://ppsupport[place].org  [place]: http://[employer].com/wi  .   Substance Abuse National Helpline ([facility])  520-213-HELP (3714) http://Findtreatment.gov   .                DISCLAIMER: These resources have been generated via the [employer] Platform. [employer] does not endorse any service providers mentioned in this resource list. [employer] does not guarantee that the services mentioned in this resource list will be available to you or will improve your health or wellness.    [facility]

## 2024-06-19 NOTE — TELEPHONE ENCOUNTER
6/19/2024 4:19PM Queenie Mackenzie  Patient confirmed rescheduled appointment:  Date: 6/24/2024  Time: 4PM  Visit type: New Cardiology  Provider: Dr. Kwan  Location: [address], [place] [address]  Testing/imaging: NA  Additional notes: 6/19 Scheduled New Cardiology w/ Dr. Kwan 6/24 in [place]. Ok per Priyank HAWLEY Cancelled 7/25 New Cardiology w/ Dr. Junior since pt should be seen in 1-2 weeks. VICTOR HUGO Mackenzie 6/19/2024 4:19PM

## 2024-06-19 NOTE — PROGRESS NOTES
"  Assessment & Plan     (E11.40,  Z79.4) Type 2 diabetes mellitus with diabetic neuropathy, with long-term current use of insulin (H)  (primary encounter diagnosis)  Comment:  uncontrolled. A1c 12.7, blood sugars often over 200. Complaints of numbness, burning, and squeezing in extremities.   Plan: Started gabapentin (NEURONTIN) 100 MG capsule, FOOT         EXAM  -Consider referral to a neurologist if symptoms persist.   - Continue Ozempic and Jardiance.  - Reinforce the importance of regular blood sugar monitoring.  - Follow up your Endocrinology      (K21.9) Gastroesophageal reflux disease, unspecified whether esophagitis present  Comment:  Complaints of heartburn and regurgitation.   Plan: omeprazole (PRILOSEC) 20 MG DR capsule  - Restart omeprazole 20 mg PO daily.  - Lifestyle modifications: avoid spicy foods, eat smaller meals, avoid lying down after eating.     (I10) Hypertension, unspecified type  Plan:   - The current medical regimen is effective;  continue present plan and medications.   - Encourage home blood pressure monitoring.  - Discuss dietary modifications, including a low-salt diet.  - Follow-up in 3 months or sooner if symptoms worsen.     (R79.89) Elevated TSH  Plan: T4, free    (I25.10) Coronary artery disease involving native coronary artery of native heart without angina pectoris  Comment: stable  Plan: clopidogrel (PLAVIX) 75 MG tablet, Adult         Cardiology al  Referral    (J45.20) Mild intermittent asthma, unspecified whether complicated  Comment: stable  Plan:   -  continue present plan      BMI  Estimated body mass index is 30.65 kg/m  as calculated from the following:    Height as of this encounter: 1.664 m (5' 5.5\").    Weight as of this encounter: 84.8 kg (187 lb).             Claudette Wood is a 48 year old, presenting for the following health issues:  Establish Care        6/19/2024     1:14 PM   Additional Questions   Roomed by Pam Armstrong MA   Accompanied by Self "     History of Present Illness       Reason for visit:  Genral    He eats 2-3 servings of fruits and vegetables daily.He consumes 0 sweetened beverage(s) daily.He exercises with enough effort to increase his heart rate 9 or less minutes per day.  He exercises with enough effort to increase his heart rate 3 or less days per week. He is missing 7 dose(s) of medications per week.       Israel Brown is a 48 year old with significant past medical history of diabetes (last A1c was greater than 12), hypertension, coronary artery disease with a stents, history of hypothyroidism (not on levothyroxine and had elevated TSH recently, free T4 was not checked at that time), hyperlipidemia, GERD and history of asthma who presented to establish care, GERD symptoms, and peripheral neuropathy symptoms.     The patient has a history of peripheral neuropathy and was previously on Lyrica, which he has not taken recently.  Currently he is complaining of numbness, burning, and squeezing in the extremities.    GERD  Patient also complains heartburn/regurgitation. Denies cough, shortness of breath, sore throat, changes of taste. No dysphagia. No chest pain.  He used to take omeprazole in the past, but has not taking recently.  He reported that he sed to take omeprazole, which provided relief but has not taken it recently.    Diabetes     How often are you checking your blood sugar? Continuous glucose monitor.  Patient saw endocrinologist 2 months ago and had A1c of 12.7.  Currently he is on Ozempic and Jardiance.  What time of day are you checking your blood sugars (select all that apply)?   When he thinks  Have you had any blood sugars above 200?  Yes   Have you had any blood sugars below 70?  No   What symptoms do you notice when your blood sugar is low?  None  What concerns do you have today about your diabetes? Blood sugar is often over 200   Do you have any of these symptoms? (Select all that apply)  Numbness in feet and  Burning in feet        Hyperlipidemia     Are you regularly taking any medication or supplement to lower your cholesterol?   Yes-   patient is on rosuvastatin.  He does not know what dose he is on but on the record/EMR there is 2 different arteries of 40 mg and 20 mg.  Are you having muscle aches or other side effects that you think could be caused by your cholesterol lowering medication?  No    Hypertension   Patient has a history of hypertension.  He is on metoprolol, lisinopril.  Patient reported compliant with medication.  Patient denies chest pain, palpitation, headache, and lower extremity edema.  He reported he does not check his blood pressure at home.  He reported he is not flowing low-salt diet.      BP Readings from Last 2 Encounters:   06/19/24 127/88   04/04/24 (!) 143/85     Hemoglobin A1C (%)   Date Value   04/04/2024 12.3 (H)   01/24/2018 9.6 (H)   08/18/2017 11.1 (H)     Afinion Hemoglobin A1c POCT (%)   Date Value   04/04/2024 12.7 (H)     LDL Cholesterol Calculated (mg/dL)   Date Value   04/04/2024 70         Asthma Follow-Up    Was ACT completed today?  Yes        6/19/2024    12:40 PM   ACT Total Scores   ACT TOTAL SCORE (Goal Greater than or Equal to 20) 24   In the past 12 months, how many times did you visit the emergency room for your asthma without being admitted to the hospital? 0   In the past 12 months, how many times were you hospitalized overnight because of your asthma? 0        How many days per week do you miss taking your asthma controller medication?  I do not have an asthma controller medication  Please describe any recent triggers for your asthma: None  Have you had any Emergency Room Visits, Urgent Care Visits, or Hospital Admissions since your last office visit?  No  Hypothyroidism Follow-up    Since last visit, patient describes the following symptoms: fatigue          Review of Systems  Constitutional, HEENT, cardiovascular, pulmonary, GI, , musculoskeletal, neuro, skin,  "endocrine and psych systems are negative, except as otherwise noted.      Objective    /88 (BP Location: Right arm, Patient Position: Chair, Cuff Size: Adult Regular)   Pulse 87   Temp 98.6  F (37  C) (Oral)   Ht 1.664 m (5' 5.5\")   Wt 84.8 kg (187 lb)   SpO2 93%   BMI 30.65 kg/m    Body mass index is 30.65 kg/m .  Physical Exam   GENERAL: alert and no distress    RESP: lungs clear to auscultation - no rales, rhonchi or wheezes  CV: regular rate and rhythm, normal S1 S2, no S3 or S4, no murmur, click or rub, no peripheral edema  ABDOMEN: soft, nontender, no hepatosplenomegaly, no masses and bowel sounds normal  MS: no gross musculoskeletal defects noted, no edema  SKIN: no suspicious lesions or rashes  NEURO: Normal strength and tone, mentation intact and speech normal  PSYCH: mentation appears normal, affect normal/bright  Diabetic foot exam: normal DP and PT pulses, no trophic changes or ulcerative lesions, and normal sensory exam            Signed Electronically by: IFEANYI Cerda CNP    "

## 2024-06-19 NOTE — LETTER
My Asthma Action Plan    Name: Israel Brown   YOB: 1975  Date: 6/19/2024   My doctor: IFEANYI Cerda CNP   My clinic: [facility]        My Rescue Medicine:   Albuterol inhaler (Proair/Ventolin/Proventil HFA)  2-4 puffs EVERY 4 HOURS as needed. Use a spacer if recommended by your provider.   My Asthma Severity:   Intermittent / Exercise Induced  Know your asthma triggers:  Nothing             GREEN ZONE   Good Control  I feel good  No cough or wheeze  Can work, sleep and play without asthma symptoms       Take your asthma control medicine every day.     If exercise triggers your asthma, take your rescue medication  15 minutes before exercise or sports, and  During exercise if you have asthma symptoms  Spacer to use with inhaler: If you have a spacer, make sure to use it with your inhaler             YELLOW ZONE Getting Worse  I have ANY of these:  I do not feel good  Cough or wheeze  Chest feels tight  Wake up at night   Keep taking your Green Zone medications  Start taking your rescue medicine:  every 20 minutes for up to 1 hour. Then every 4 hours for 24-48 hours.  If you stay in the Yellow Zone for more than 12-24 hours, contact your doctor.  If you do not return to the Green Zone in 12-24 hours or you get worse, start taking your oral steroid medicine if prescribed by your provider.           RED ZONE Medical Alert - Get Help  I have ANY of these:  I feel awful  Medicine is not helping  Breathing getting harder  Trouble walking or talking  Nose opens wide to breathe       Take your rescue medicine NOW  If your provider has prescribed an oral steroid medicine, start taking it NOW  Call your doctor NOW  If you are still in the Red Zone after 20 minutes and you have not reached your doctor:  Take your rescue medicine again and  Call 911 or go to the emergency room right away    See your regular doctor within 2 weeks of an Emergency Room or Urgent Care visit for  follow-up treatment.          Annual Reminders:  Meet with Asthma Educator,  Flu Shot in the Fall, consider Pneumonia Vaccination for patients with asthma (aged 19 and older).    Pharmacy:    [facility] - [place], [place] - [address] [id number] - [place], [place] - [address] [facility] - [place], [place] - [address]  [facility] - [place], [place] - [address] [facility] [address]    Electronically signed by IFEANYI Cerda CNP   Date: 06/19/24                    Asthma Triggers  How To Control Things That Make Your Asthma Worse    Triggers are things that make your asthma worse.  Look at the list below to help you find your triggers and   what you can do about them. You can help prevent asthma flare-ups by staying away from your triggers.      Trigger                                                          What you can do   Cigarette Smoke  Tobacco smoke can make asthma worse. Do not allow smoking in your home, car or around you.  Be sure no one smokes at a child s day care or school.  If you smoke, ask your health care provider for ways to help you quit.  Ask family members to quit too.  Ask your health care provider for a referral to Quit Plan to help you quit smoking, or call 2-931-719-PLAN.     Colds, Flu, Bronchitis  These are common triggers of asthma. Wash your hands often.  Don t touch your eyes, nose or mouth.  Get a flu shot every year.     Dust Mites  These are tiny bugs that live in cloth or carpet. They are too small to see. Wash sheets and blankets in hot water every week.   Encase pillows and mattress in dust mite proof covers.  Avoid having carpet if you can. If you have carpet, vacuum weekly.   Use a dust mask and HEPA vacuum.   Pollen and Outdoor Mold  Some people are allergic to trees, grass, or weed pollen, or molds. Try to keep your windows closed.  Limit time out doors when pollen count is high.   Ask you  health care provider about taking medicine during allergy season.     Animal Dander  Some people are allergic to skin flakes, urine or saliva from pets with fur or feathers. Keep pets with fur or feathers out of your home.    If you can t keep the pet outdoors, then keep the pet out of your bedroom.  Keep the bedroom door closed.  Keep pets off cloth furniture and away from stuffed toys.     Mice, Rats, and Cockroaches  Some people are allergic to the waste from these pests.   Cover food and garbage.  Clean up spills and food crumbs.  Store grease in the refrigerator.   Keep food out of the bedroom.   Indoor Mold  This can be a trigger if your home has high moisture. Fix leaking faucets, pipes, or other sources of water.   Clean moldy surfaces.  Dehumidify basement if it is damp and smelly.   Smoke, Strong Odors, and Sprays  These can reduce air quality. Stay away from strong odors and sprays, such as perfume, powder, hair spray, paints, smoke incense, paint, cleaning products, candles and new carpet.   Exercise or Sports  Some people with asthma have this trigger. Be active!  Ask your doctor about taking medicine before sports or exercise to prevent symptoms.    Warm up for 5-10 minutes before and after sports or exercise.     Other Triggers of Asthma  Cold air:  Cover your nose and mouth with a scarf.  Sometimes laughing or crying can be a trigger.  Some medicines and food can trigger asthma.

## 2024-06-19 NOTE — TELEPHONE ENCOUNTER
This encounter is being sent to inform the clinic that this patient has a referral from     Lloyd Dunn APRN CNP    for the diagnoses of     Coronary artery disease involving native coronary artery of native heart without angina pectoris [I25.10]    and has requested that this patient be seen within 1-2 weeks and/or with any provider.  Based on the availability of our provider(s), we are unable to accommodate this request.    Were all sites offered this patient?  Yes    Does scheduling algorithm request to schedule next available?  Patient has been scheduled for the first available opening with Dr. Junior on 7/25/24.  We have informed the patient that the clinic will review their referral and reach out if a sooner appointment is medically necessary.      Patient scheduled next available as above. Please assist him for sooner appointment per referral request.

## 2024-06-20 LAB — T4 FREE SERPL-MCNC: 1.21 NG/DL (ref 0.9–1.7)

## 2024-06-23 NOTE — PROGRESS NOTES
[facility]      Referring provider:Lloyd Dunn APRN CNP       HPI: Mr. Israel Brown is a 48 year old  male with PMH significant for  -GERD   -Obesity  -Type 2 diabetes with history of peripheral neuropathy and retinopathy, not well-controlled most recent hemoglobin A1c 12.3  -Hypertension  -STEMI status post drug-eluting stent to mid LAD/diagonal 1, 11/18/2022  -LVEF 45 to 50% by echocardiogram 2022    Patient is being seen today for history of coronary artery disease.  Patient presented to outside hospital 11/18/2022 with chest pain and EKG was consistent with STEMI, underwent coronary angiogram with drug-eluting stent to mid LAD and diagonal 1.  Echo showed LVEF 45 to 50%.  He followed up with cardiology clinic at [facility] until 2023.    He is establishing care with us.  He has no cardiac symptoms at this time.  He works as a .  He sleeps during the day and works at night.  Denies chest pain, shortness of breath, dizziness, palpitations.  He tells me he has some swelling around the ankles.  Lifetime non-smoker.  No alcohol abuse.    Patient currently on aspirin, clopidogrel, Lasix 20 mg?, lisinopril 5 mg, metoprolol 25 mg, Crestor 20 mg    LDL well-controlled.    Medications, personal, family, and social history reviewed with patient and revised.    PAST MEDICAL HISTORY:  Past Medical History:   Diagnosis Date    Asthma     Diabetes mellitus (H)     Neuropathy     RA (rheumatoid arthritis) (H)        CURRENT MEDICATIONS:  Current Outpatient Medications   Medication Sig Dispense Refill    ASPIRIN LOW DOSE 81 MG chewable tablet CHEW AND SWALLOW ONE TABLET BY MOUTH EVERY DAY      clopidogrel (PLAVIX) 75 MG tablet Take 1 tablet (75 mg) by mouth daily 60 tablet 0    Continuous Blood Gluc  (DEXCOM G7 ) GUY Use to read blood sugars as per 's instructions. 1 each 0    Continuous Blood Gluc Sensor (DEXCOM G7  SENSOR) MISC Change every 10 days. 3 each 5    empagliflozin (JARDIANCE) 25 MG TABS tablet Take 25 mg by mouth daily      fluticasone (FLOVENT HFA) 220 MCG/ACT inhaler Inhale 1 puff into the lungs 2 times daily 30 g 0    furosemide (LASIX) 20 MG tablet Take 20 mg by mouth daily      gabapentin (NEURONTIN) 100 MG capsule Take 1 capsule (100 mg) by mouth 3 times daily 90 capsule 0    insulin aspart (NOVOLOG FLEXPEN) 100 UNIT/ML injection Inject Subcutaneous 3 times daily (with meals)      levothyroxine (SYNTHROID/LEVOTHROID) 50 MCG tablet Take 50 mcg by mouth      lisinopril (PRINIVIL/ZESTRIL) 5 MG tablet Take 5 mg by mouth      methocarbamol (ROBAXIN) 500 MG tablet Take 500 mg by mouth      metoprolol succinate ER (TOPROL XL) 25 MG 24 hr tablet Take 25 mg by mouth daily      nitroGLYcerin (NITROSTAT) 0.4 MG sublingual tablet Place 0.4 mg under the tongue      omeprazole (PRILOSEC) 20 MG DR capsule Take 2 capsules (40 mg) by mouth daily 30 capsule 3    ondansetron (ZOFRAN ODT) 4 MG ODT tab Take 1 tablet (4 mg) by mouth every 8 hours as needed for nausea 10 tablet 0    Pregabalin (LYRICA PO)       rosuvastatin (CRESTOR) 20 MG tablet Take 20 mg by mouth at bedtime      rosuvastatin (CRESTOR) 40 MG tablet Take 40 mg by mouth daily      semaglutide (OZEMPIC) 2 MG/3ML pen Inject 0.25 mg Subcutaneous every 7 days 3 mL 1    SEMGLEE, YFGN, 100 UNIT/ML SOPN Inject 50 Units Subcutaneous daily         PAST SURGICAL HISTORY:  Past Surgical History:   Procedure Laterality Date    REALIGN PATELLA         ALLERGIES:     Allergies   Allergen Reactions    Cortisone     Penicillins     Statins Other (See Comments)     Side effect: myalgias. Ok with zocor only       FAMILY HISTORY:  Family History   Problem Relation Age of Onset    Seizure Disorder Mother     Glaucoma Other     Macular Degeneration No family hx of          SOCIAL HISTORY:  Social History     Tobacco Use    Smoking status: Never     Passive exposure: Never    Smokeless  tobacco: Never    Tobacco comments:     NA   Vaping Use    Vaping status: Never Used   Substance Use Topics    Alcohol use: No    Drug use: No       ROS:   Constitutional: No fever, chills, or sweats. Weight stable.   Cardiovascular: As per HPI.       Exam:  BP 98/68 (BP Location: Right arm, Patient Position: Sitting, Cuff Size: Adult Regular)   Pulse 101   Wt 85.4 kg (188 lb 3.2 oz)   SpO2 91%   BMI 30.84 kg/m    GENERAL APPEARANCE: alert and no distress  HEENT: no icterus, no central cyanosis  LYMPH/NECK: no adenopathy, no asymmetry, JVP not elevated, no carotid bruits.  RESPIRATORY: lungs clear to auscultation - no rales, rhonchi or wheezes, no use of accessory muscles, no retractions, respirations are unlabored, normal respiratory rate  CARDIOVASCULAR: regular rhythm, normal S1, S2, no S3 or S4 and no murmur, click or rub, precordium quiet with normal PMI.  GI: soft, non tender  EXTREMITIES: peripheral pulses normal, no edema  NEURO: alert, normal speech,and affect  SKIN: no ecchymoses, no rashes     I have reviewed the labs and personally reviewed the imaging below and made my comment in the assessment and plan.    Labs:  CBC RESULTS:   Lab Results   Component Value Date    WBC 7.4 03/24/2021    RBC 5.54 03/24/2021    HGB 14.9 03/24/2021    HCT 45.1 03/24/2021    MCV 81 03/24/2021    MCH 26.9 03/24/2021    MCHC 33.0 03/24/2021    RDW 14.2 03/24/2021     03/24/2021       BMP RESULTS:  Lab Results   Component Value Date     04/04/2024     03/24/2021    POTASSIUM 4.1 04/04/2024    POTASSIUM 3.8 03/24/2021    CHLORIDE 102 04/04/2024    CHLORIDE 104 03/24/2021    CO2 22 04/04/2024    CO2 26 03/24/2021    ANIONGAP 14 04/04/2024    ANIONGAP 6 03/24/2021     (H) 04/04/2024     (H) 03/24/2021    BUN 27.0 (H) 04/04/2024    BUN 17 03/24/2021    CR 1.04 04/04/2024    CR 0.89 03/24/2021    GFRESTIMATED 87 04/04/2024    GFRESTIMATED >90 03/24/2021    GFRESTBLACK >90 03/24/2021    VIVEK 9.2  04/04/2024    VIVEK 8.2 (L) 03/24/2021          Echocardiogram 2021    Exercise was stopped due to fatigue.  There was a normal BP response to exercise.  The patient exhibited no chest pain during exercise.  The patient exercised 6:50.  A low to moderate workload was achieved.  Target Heart Rate was not achieved due to fatigue.  Suboptimal stress test due to inadequate maximum heart rate.  A treadmill exercise test according to the Osman protocol was performed.  No arrhythmia noted.  The EKG portion of this stress test was negative for inducible ischemia (see  echo results below).  The Duke treadmill score was intermediate risk ( -11< Hinojosa score <5).  The visual ejection fraction is estimated at >70%.  Left ventricular cavity size decreases with exercise.  Global LV systolic function augments with exercise.  Normal resting wall motion and no stress-induced wall motion abnormality.     Baseline  The patient is in normal sinus rhythm.  Poor R wave progression across the precordial leads.  The visual ejection fraction is estimated at 60-65%.  No regional wall motion abnormalities noted.    Echocardiogram 11/2022 LVEF 45 to 50%    Coronary angiogram [facility] 11/18/2022  Summary/Conclusions   PRESENTATION / INDICATIONS   * STEMI     DIAGNOSTIC SUMMARY   ? Proximal LAD 90%   ? 100% stenosis in the Mid LAD   ? 100% stenosis in the 1st Diagonal   ? The Circumflex is free of significant disease.   ? The RCA has mild luminal irregularities.     INTERVENTION   ? 2.75mm x 38mm Balloon and  3mm x 20mm Balloon to Proximal LAD   ? Successful  2.5mm x 20mm Balloon,  2.5mm x 38mm Drug Eluting Stent,  2.5mm x 38mm Balloon, and  3mm x 20mm Balloon to Mid LAD   ? Successful  2mm x 12mm Balloon,  2mm x 8mm Drug Eluting Stent, and  2mm x 8mm Balloon to 1st Diagonal, post stenosis 0%     RECOMMENDATIONS & PLAN   * Plavix for 1 year       Assessment and Plan:     # History of STEMI in 2022 status post LAD/diagonal 1 stent  -Patient  asymptomatic.  -Recommend transthoracic echocardiogram to recheck LV function.  Prior echo in 2022 showed mildly reduced LVEF (45 to 50%) consistent with ischemic cardiomyopathy.  -Continue current medications with metoprolol 25, lisinopril 5 mg, Crestor, aspirin and clopidogrel.  I did mention the risk of bleeding with dual antiplatelet therapy however given severely uncontrolled diabetes type 2 I am concerned to take him off of Plavix at this time.  -Recommend moderate physical activity half an hour per day.  He has sedentary lifestyle.    # Type 2 diabetes, uncontrolled  -Follows with endocrinology.  On semaglutide, insulin, Jardiance 25 mg.  Hemoglobin A1c was 12.32 months ago.  Patient has proteinuria which likely explains his ankle swelling.    No medication changes today.  Patient is unsure if he is taking Lasix and unsure about the dose of Crestor.  When he goes home is good to check his medications and update us through [employer].    Return to clinic 1 year or earlier as needed.    Total time spent today for this visit is 30 minutes including precharting, face-to-face clinic visit, review of labs/imaging and medical documentation.    Alicja NAVARRO MD  [facility] Division of Cardiology  Pager [phone]

## 2024-06-24 ENCOUNTER — OFFICE VISIT (OUTPATIENT)
Dept: CARDIOLOGY | Facility: CLINIC | Age: 49
End: 2024-06-24
Payer: COMMERCIAL

## 2024-06-24 VITALS
SYSTOLIC BLOOD PRESSURE: 98 MMHG | DIASTOLIC BLOOD PRESSURE: 68 MMHG | HEART RATE: 101 BPM | BODY MASS INDEX: 30.84 KG/M2 | WEIGHT: 188.2 LBS | OXYGEN SATURATION: 91 %

## 2024-06-24 DIAGNOSIS — I25.10 CORONARY ARTERY DISEASE INVOLVING NATIVE CORONARY ARTERY OF NATIVE HEART WITHOUT ANGINA PECTORIS: ICD-10-CM

## 2024-06-24 DIAGNOSIS — E11.9 TYPE 2 DIABETES, HBA1C GOAL < 7% (H): ICD-10-CM

## 2024-06-24 DIAGNOSIS — I25.2 HISTORY OF ST ELEVATION MYOCARDIAL INFARCTION (STEMI): Primary | ICD-10-CM

## 2024-06-24 PROCEDURE — 99203 OFFICE O/P NEW LOW 30 MIN: CPT | Performed by: INTERNAL MEDICINE

## 2024-06-24 RX ORDER — CLINDAMYCIN PALMITATE HYDROCHLORIDE 75 MG/5ML
SOLUTION ORAL
COMMUNITY
Start: 2024-02-02

## 2024-06-24 NOTE — NURSING NOTE
"Chief Complaint   Patient presents with    New Patient     New general cardiology for Coronary artery disease involving native coronary artery of native heart without angina pectoris       Initial BP 98/68 (BP Location: Right arm, Patient Position: Sitting, Cuff Size: Adult Regular)   Pulse 101   Wt 85.4 kg (188 lb 3.2 oz)   SpO2 91%   BMI 30.84 kg/m   Estimated body mass index is 30.84 kg/m  as calculated from the following:    Height as of 6/19/24: 1.664 m (5' 5.5\").    Weight as of this encounter: 85.4 kg (188 lb 3.2 oz)..  BP completed using cuff size: regular    ROBLES Alexander    "

## 2024-06-24 NOTE — LETTER
6/24/2024      RE: Israel Brown  [address] [address]       Dear Colleague,    Thank you for the opportunity to participate in the care of your patient, Israel Brown, at the [facility]ART [facility]IC [facility]. Please see a copy of my visit note below.                                              General Cardiology Clinic-[place]      Referring provider:Lloyd Dunn APRN CNP       HPI: Mr. Israel Brown is a 48 year old  male with PMH significant for  -GERD   -Obesity  -Type 2 diabetes with history of peripheral neuropathy and retinopathy, not well-controlled most recent hemoglobin A1c 12.3  -Hypertension  -STEMI status post drug-eluting stent to mid LAD/diagonal 1, 11/18/2022  -LVEF 45 to 50% by echocardiogram 2022    Patient is being seen today for history of coronary artery disease.  Patient presented to outside hospital 11/18/2022 with chest pain and EKG was consistent with STEMI, underwent coronary angiogram with drug-eluting stent to mid LAD and diagonal 1.  Echo showed LVEF 45 to 50%.  He followed up with cardiology clinic at [facility] until 2023.    He is establishing care with us.  He has no cardiac symptoms at this time.  He works as a .  He sleeps during the day and works at night.  Denies chest pain, shortness of breath, dizziness, palpitations.  He tells me he has some swelling around the ankles.  Lifetime non-smoker.  No alcohol abuse.    Patient currently on aspirin, clopidogrel, Lasix 20 mg?, lisinopril 5 mg, metoprolol 25 mg, Crestor 20 mg    LDL well-controlled.    Medications, personal, family, and social history reviewed with patient and revised.    PAST MEDICAL HISTORY:  Past Medical History:   Diagnosis Date     Asthma      Diabetes mellitus (H)      Neuropathy      RA (rheumatoid arthritis) (H)        CURRENT MEDICATIONS:  Current Outpatient Medications   Medication Sig Dispense  Refill     ASPIRIN LOW DOSE 81 MG chewable tablet CHEW AND SWALLOW ONE TABLET BY MOUTH EVERY DAY       clopidogrel (PLAVIX) 75 MG tablet Take 1 tablet (75 mg) by mouth daily 60 tablet 0     Continuous Blood Gluc  (DEXCOM G7 ) GUY Use to read blood sugars as per 's instructions. 1 each 0     Continuous Blood Gluc Sensor (DEXCOM G7 SENSOR) MISC Change every 10 days. 3 each 5     empagliflozin (JARDIANCE) 25 MG TABS tablet Take 25 mg by mouth daily       fluticasone (FLOVENT HFA) 220 MCG/ACT inhaler Inhale 1 puff into the lungs 2 times daily 30 g 0     furosemide (LASIX) 20 MG tablet Take 20 mg by mouth daily       gabapentin (NEURONTIN) 100 MG capsule Take 1 capsule (100 mg) by mouth 3 times daily 90 capsule 0     insulin aspart (NOVOLOG FLEXPEN) 100 UNIT/ML injection Inject Subcutaneous 3 times daily (with meals)       levothyroxine (SYNTHROID/LEVOTHROID) 50 MCG tablet Take 50 mcg by mouth       lisinopril (PRINIVIL/ZESTRIL) 5 MG tablet Take 5 mg by mouth       methocarbamol (ROBAXIN) 500 MG tablet Take 500 mg by mouth       metoprolol succinate ER (TOPROL XL) 25 MG 24 hr tablet Take 25 mg by mouth daily       nitroGLYcerin (NITROSTAT) 0.4 MG sublingual tablet Place 0.4 mg under the tongue       omeprazole (PRILOSEC) 20 MG DR capsule Take 2 capsules (40 mg) by mouth daily 30 capsule 3     ondansetron (ZOFRAN ODT) 4 MG ODT tab Take 1 tablet (4 mg) by mouth every 8 hours as needed for nausea 10 tablet 0     Pregabalin (LYRICA PO)        rosuvastatin (CRESTOR) 20 MG tablet Take 20 mg by mouth at bedtime       rosuvastatin (CRESTOR) 40 MG tablet Take 40 mg by mouth daily       semaglutide (OZEMPIC) 2 MG/3ML pen Inject 0.25 mg Subcutaneous every 7 days 3 mL 1     SEMGLEE, YFGN, 100 UNIT/ML SOPN Inject 50 Units Subcutaneous daily         PAST SURGICAL HISTORY:  Past Surgical History:   Procedure Laterality Date     REALIGN PATELLA         ALLERGIES:     Allergies   Allergen Reactions      Cortisone      Penicillins      Statins Other (See Comments)     Side effect: myalgias. Ok with zocor only       FAMILY HISTORY:  Family History   Problem Relation Age of Onset     Seizure Disorder Mother      Glaucoma Other      Macular Degeneration No family hx of          SOCIAL HISTORY:  Social History     Tobacco Use     Smoking status: Never     Passive exposure: Never     Smokeless tobacco: Never     Tobacco comments:     NA   Vaping Use     Vaping status: Never Used   Substance Use Topics     Alcohol use: No     Drug use: No       ROS:   Constitutional: No fever, chills, or sweats. Weight stable.   Cardiovascular: As per HPI.       Exam:  BP 98/68 (BP Location: Right arm, Patient Position: Sitting, Cuff Size: Adult Regular)   Pulse 101   Wt 85.4 kg (188 lb 3.2 oz)   SpO2 91%   BMI 30.84 kg/m    GENERAL APPEARANCE: alert and no distress  HEENT: no icterus, no central cyanosis  LYMPH/NECK: no adenopathy, no asymmetry, JVP not elevated, no carotid bruits.  RESPIRATORY: lungs clear to auscultation - no rales, rhonchi or wheezes, no use of accessory muscles, no retractions, respirations are unlabored, normal respiratory rate  CARDIOVASCULAR: regular rhythm, normal S1, S2, no S3 or S4 and no murmur, click or rub, precordium quiet with normal PMI.  GI: soft, non tender  EXTREMITIES: peripheral pulses normal, no edema  NEURO: alert, normal speech,and affect  SKIN: no ecchymoses, no rashes     I have reviewed the labs and personally reviewed the imaging below and made my comment in the assessment and plan.    Labs:  CBC RESULTS:   Lab Results   Component Value Date    WBC 7.4 03/24/2021    RBC 5.54 03/24/2021    HGB 14.9 03/24/2021    HCT 45.1 03/24/2021    MCV 81 03/24/2021    MCH 26.9 03/24/2021    MCHC 33.0 03/24/2021    RDW 14.2 03/24/2021     03/24/2021       BMP RESULTS:  Lab Results   Component Value Date     04/04/2024     03/24/2021    POTASSIUM 4.1 04/04/2024    POTASSIUM 3.8  03/24/2021    CHLORIDE 102 04/04/2024    CHLORIDE 104 03/24/2021    CO2 22 04/04/2024    CO2 26 03/24/2021    ANIONGAP 14 04/04/2024    ANIONGAP 6 03/24/2021     (H) 04/04/2024     (H) 03/24/2021    BUN 27.0 (H) 04/04/2024    BUN 17 03/24/2021    CR 1.04 04/04/2024    CR 0.89 03/24/2021    GFRESTIMATED 87 04/04/2024    GFRESTIMATED >90 03/24/2021    GFRESTBLACK >90 03/24/2021    VIVEK 9.2 04/04/2024    VIVEK 8.2 (L) 03/24/2021          Echocardiogram 2021    Exercise was stopped due to fatigue.  There was a normal BP response to exercise.  The patient exhibited no chest pain during exercise.  The patient exercised 6:50.  A low to moderate workload was achieved.  Target Heart Rate was not achieved due to fatigue.  Suboptimal stress test due to inadequate maximum heart rate.  A treadmill exercise test according to the Osman protocol was performed.  No arrhythmia noted.  The EKG portion of this stress test was negative for inducible ischemia (see  echo results below).  The Duke treadmill score was intermediate risk ( -11< Hinojosa score <5).  The visual ejection fraction is estimated at >70%.  Left ventricular cavity size decreases with exercise.  Global LV systolic function augments with exercise.  Normal resting wall motion and no stress-induced wall motion abnormality.     Baseline  The patient is in normal sinus rhythm.  Poor R wave progression across the precordial leads.  The visual ejection fraction is estimated at 60-65%.  No regional wall motion abnormalities noted.    Echocardiogram 11/2022 LVEF 45 to 50%    Coronary angiogram [facility] 11/18/2022  Summary/Conclusions   PRESENTATION / INDICATIONS   * STEMI     DIAGNOSTIC SUMMARY   ? Proximal LAD 90%   ? 100% stenosis in the Mid LAD   ? 100% stenosis in the 1st Diagonal   ? The Circumflex is free of significant disease.   ? The RCA has mild luminal irregularities.     INTERVENTION   ? 2.75mm x 38mm Balloon and  3mm x 20mm Balloon to Proximal LAD   ?  Successful  2.5mm x 20mm Balloon,  2.5mm x 38mm Drug Eluting Stent,  2.5mm x 38mm Balloon, and  3mm x 20mm Balloon to Mid LAD   ? Successful  2mm x 12mm Balloon,  2mm x 8mm Drug Eluting Stent, and  2mm x 8mm Balloon to 1st Diagonal, post stenosis 0%     RECOMMENDATIONS & PLAN   * Plavix for 1 year       Assessment and Plan:     # History of STEMI in 2022 status post LAD/diagonal 1 stent  -Patient asymptomatic.  -Recommend transthoracic echocardiogram to recheck LV function.  Prior echo in 2022 showed mildly reduced LVEF (45 to 50%) consistent with ischemic cardiomyopathy.  -Continue current medications with metoprolol 25, lisinopril 5 mg, Crestor, aspirin and clopidogrel.  I did mention the risk of bleeding with dual antiplatelet therapy however given severely uncontrolled diabetes type 2 I am concerned to take him off of Plavix at this time.  -Recommend moderate physical activity half an hour per day.  He has sedentary lifestyle.    # Type 2 diabetes, uncontrolled  -Follows with endocrinology.  On semaglutide, insulin, Jardiance 25 mg.  Hemoglobin A1c was 12.32 months ago.  Patient has proteinuria which likely explains his ankle swelling.    No medication changes today.  Patient is unsure if he is taking Lasix and unsure about the dose of Crestor.  When he goes home is good to check his medications and update us through [employer].    Return to clinic 1 year or earlier as needed.    Total time spent today for this visit is 30 minutes including precharting, face-to-face clinic visit, review of labs/imaging and medical documentation.    Alicja NAVARRO MD  [facility] Division of Cardiology  Pager [phone]

## 2024-06-24 NOTE — PATIENT INSTRUCTIONS
Thank you for coming to the [facility] Heart Clinic at [place]; please note the following instructions:    1. Please send us a MyChart when you get home or call us at [phone] to let us know what medications you are taking    2. Echocardiogram    3. Recommended walking 30 minutes five times a week    4. Cardiology Providers: Dr. Helm Can would like you to return for a cardiac follow up in 1 year (June).  We will contact you regarding your appointment when the time draws closer or you may call [phone] option #1 to arrange an appointment. Mean while, if you should have any questions or concerns regarding your heart health, please contact us. Thank you for choosing [facility] for your care.          If you have any questions regarding your visit, please contact your care team:     CARDIOLOGY  TELEPHONE NUMBER   Sindy BRICENOJensen, Registered Nurse  Orquidea JACINTO, Registered Nurse  Tanika CERNA, Registered Medical Assistant  Ирина WATERS, Certified Medical Assistant  Tanna HONEYCUTT, Clinic Assistant [phone] (select option 1)    *After hours: [phone]   For Scheduling Appts:     [phone] (select option 1)    *After hours: [phone]   For the Device Clinic (Pacemakers and ICD's)  Karo LOVELACE, Registered Nurse   During business hours: [phone]    *After business hours:  [phone] (select option 4)      Normal test result notifications will be released via [employer] or mailed within 7 business days.  All other test results, will be communicated via telephone once reviewed by your cardiologist.    If you need a medication refill, please contact your pharmacy.  Please allow 3 business days for your refill to be completed.    As always, thank you for trusting us with your health care needs!

## 2024-06-25 ENCOUNTER — MYC MEDICAL ADVICE (OUTPATIENT)
Dept: CARDIOLOGY | Facility: CLINIC | Age: 49
End: 2024-06-25
Payer: COMMERCIAL

## 2024-06-25 NOTE — TELEPHONE ENCOUNTER
Patient saw Dr. Kwan on 6/25/24. Patient was to send [employer] message to confirm medications. [employer] message sent to patient to follow up on medications.    Orquidea Duvall, RN, BSN  Cardiology RN Care Coordinator   [place]   Phone: [phone]  Fax: [phone] ([place]) [phone] ([place])

## 2024-06-26 NOTE — TELEPHONE ENCOUNTER
Attempted to call patient. Left message for patient.    Orquidea Duvall, RN, BSN  Cardiology RN Care Coordinator   [place]   Phone: [phone]  Fax: [phone] ([place]) [phone] ([place])

## 2024-06-30 ENCOUNTER — HEALTH MAINTENANCE LETTER (OUTPATIENT)
Age: 49
End: 2024-06-30

## 2024-07-03 NOTE — TELEPHONE ENCOUNTER
Attempted to call patient. Left message for patient.    Orquidea Duvall, RN, BSN  Cardiology RN Care Coordinator   [place]   Phone: [phone]  Fax: [phone] ([place]) [phone] ([place])

## 2024-07-05 NOTE — TELEPHONE ENCOUNTER
Multiple attempts to call patient. Unable to reach patent. [employer] message already sent to patient.    Orquidea Duvall, RN, BSN  Cardiology RN Care Coordinator   [place]   Phone: [phone]  Fax: [phone] ([place]) [phone] ([place])

## 2024-07-10 DIAGNOSIS — E08.3313 MODERATE NONPROLIFERATIVE DIABETIC RETINOPATHY OF BOTH EYES WITH MACULAR EDEMA ASSOCIATED WITH DIABETES MELLITUS DUE TO UNDERLYING CONDITION (H): Primary | ICD-10-CM

## 2024-07-17 DIAGNOSIS — E11.40 TYPE 2 DIABETES MELLITUS WITH DIABETIC NEUROPATHY, WITH LONG-TERM CURRENT USE OF INSULIN (H): ICD-10-CM

## 2024-07-17 DIAGNOSIS — Z79.4 TYPE 2 DIABETES MELLITUS WITH DIABETIC NEUROPATHY, WITH LONG-TERM CURRENT USE OF INSULIN (H): ICD-10-CM

## 2024-07-17 NOTE — TELEPHONE ENCOUNTER
semaglutide (OZEMPIC) 2 MG/3ML pen   3 mL 1 4/8/2024     Last Office Visit : 4-4-2024  Future Office visit:  8-      GLP-1 Agonists Protocol [id number] 08:02 AM   Protocol Details HgbA1C in past 3 or 6 months     Labs completed on : 4-4-2024  Hemoglobin A1C  0.0 - 5.6 % 12.3 High        Creatinine   Date Value Ref Range Status   04/04/2024 1.04 0.67 - 1.17 mg/dL Final   03/24/2021 0.89 0.66 - 1.25 mg/dL Final

## 2024-07-29 ENCOUNTER — ANCILLARY PROCEDURE (OUTPATIENT)
Dept: CARDIOLOGY | Facility: CLINIC | Age: 49
End: 2024-07-29
Attending: INTERNAL MEDICINE
Payer: COMMERCIAL

## 2024-07-29 DIAGNOSIS — I25.10 CORONARY ARTERY DISEASE INVOLVING NATIVE CORONARY ARTERY OF NATIVE HEART WITHOUT ANGINA PECTORIS: ICD-10-CM

## 2024-07-29 LAB — LVEF ECHO: NORMAL

## 2024-07-29 PROCEDURE — 93306 TTE W/DOPPLER COMPLETE: CPT | Performed by: INTERNAL MEDICINE

## 2024-08-19 NOTE — PROGRESS NOTES
Outcome for 08/19/24 2:31 PM:  Dexcom-mychart sent w/share code   Kathy Chin CMA  Adult Endocrinology   [facility]

## 2024-08-21 DIAGNOSIS — Z79.4 TYPE 2 DIABETES MELLITUS WITH DIABETIC NEUROPATHY, WITH LONG-TERM CURRENT USE OF INSULIN (H): ICD-10-CM

## 2024-08-21 DIAGNOSIS — E11.40 TYPE 2 DIABETES MELLITUS WITH DIABETIC NEUROPATHY, WITH LONG-TERM CURRENT USE OF INSULIN (H): ICD-10-CM

## 2024-08-21 RX ORDER — GABAPENTIN 100 MG/1
CAPSULE ORAL
Qty: 90 CAPSULE | Refills: 0 | Status: SHIPPED | OUTPATIENT
Start: 2024-08-21 | End: 2024-09-16

## 2024-08-22 ENCOUNTER — OFFICE VISIT (OUTPATIENT)
Dept: ENDOCRINOLOGY | Facility: CLINIC | Age: 49
End: 2024-08-22
Payer: COMMERCIAL

## 2024-08-22 VITALS
HEART RATE: 105 BPM | WEIGHT: 184 LBS | DIASTOLIC BLOOD PRESSURE: 84 MMHG | OXYGEN SATURATION: 98 % | RESPIRATION RATE: 16 BRPM | BODY MASS INDEX: 30.15 KG/M2 | SYSTOLIC BLOOD PRESSURE: 127 MMHG

## 2024-08-22 DIAGNOSIS — E11.40 TYPE 2 DIABETES MELLITUS WITH DIABETIC NEUROPATHY, WITH LONG-TERM CURRENT USE OF INSULIN (H): ICD-10-CM

## 2024-08-22 DIAGNOSIS — Z79.4 TYPE 2 DIABETES MELLITUS WITH DIABETIC NEUROPATHY, WITH LONG-TERM CURRENT USE OF INSULIN (H): ICD-10-CM

## 2024-08-22 LAB — HBA1C MFR BLD: 10.5 %

## 2024-08-22 PROCEDURE — 99213 OFFICE O/P EST LOW 20 MIN: CPT | Performed by: PHYSICIAN ASSISTANT

## 2024-08-22 PROCEDURE — 83036 HEMOGLOBIN GLYCOSYLATED A1C: CPT | Performed by: PHYSICIAN ASSISTANT

## 2024-08-22 NOTE — PROGRESS NOTES
Assessment/Plan :   Type 2 DM. Israel has been working hard to get his blood sugars under better control. He does feel like the Ozempic is helping but his numbers are still too elevated. We discussed his current medications and we discussed ways to be more consistent with insulin dosing. His wife uses the Omnipod insulin pump and it has made a significant improvement in her blood sugar control. Israel is not interested in pumps with tubing but he would be interested in trying the Omnipod. I really feel like this will help with his overall compliance and blood sugar control. We will also continue to titrate the Ozempic dose and a new prescription for 0.5 mg weekly was sent to his pharmacy. I will place a referral to our CDE team for pump training and we will follow-up in 4 mos.       I have independently reviewed and interpreted labs, imaging as indicated.      Chief complaint:  Israel is a 48 year old male who returns for follow-up of Type 2 DM.    I have reviewed Care Everywhere including [facility], [facility], [facility], [facility], [facility] , [facility], [place] lab reports, imaging reports and provider notes as indicated.      HISTORY OF PRESENT ILLNESS  Israel continues to work on improving his blood sugars. His biggest issue continues to be remembering to take his insulin, every day. He has continued to take 0.25 mg of Ozempic weekly and he feels like this has helped but he is still getting a lot of glucose fluctuation throughout the day. Along with the Ozempic, he is taking 50 unit(s) of Semglee daily and Jardiance 25 mg daily. He has been monitoring his blood sugars with the Dexcom G7 sensor.    Israel has not had any problems with severe hyperglycemia and/or hypoglycemia. His numbers are improving but still too elevated. He still struggles to remember to take the long acting insulin, consistently. He has set alarms, his wife will try and remind him, but he will still forget. He has not had  any problems blurred vision or an increase in numbness/tingling in his feet. He did have an episode chest pain that did not resolve with nitroglycerin, and he was admitted for a couple days for observation. He knows that ongoing hyperglycemia is hard on his heart, so he is determined to get his blood sugars under better control.    Israel was diagnosed with diabetes in 2004. He was started on metformin at the time of diagnosis but the medication didn't agree with him. He states that he just never felt good when taking the medication. He was then started on insulin. He feels like insulin works well, if he can remember to take it. He currently works nights and he will often forget to take insulin when he gets home in the morning. He has also never been able to take meal time insulin, consistently. If he remembers, he takes Lantus 60 unit(s) daily along with Jardiance 10 mg daily. Ozempic was started in April of 2024, to try and help with meal time spikes. His diabetes is complicated by CAD with a history of a non-STEMI, hypothyroidism, hyperlipidemia, HTN, GERD, KEKE, and ischemic cardiomyopathy. In August of 2024 he was admitted with heart attack like symptoms.     Endocrine relevant labs are as follows:   Latest Reference Range & Units 08/22/24 12:33   Afinion Hemoglobin A1c POCT <=5.7 % 10.5 (H)   (H): Data is abnormally high   Latest Reference Range & Units 04/04/24 07:57   Hemoglobin A1C 0.0 - 5.6 % 12.3 (H)   (H): Data is abnormally high   Latest Reference Range & Units 04/04/24 07:57   Albumin Urine mg/g Cr 0.00 - 17.00 mg/g Cr 144.02 (H)   (H): Data is abnormally high   Latest Reference Range & Units 04/04/24 07:57   Albumin Urine mg/L mg/L 106.0     REVIEW OF SYSTEMS    Endocrine: positive for diabetes  Skin: negative  Eyes: negative for, visual blurring, redness, tearing  Ears/Nose/Throat: negative  Respiratory: No shortness of breath, dyspnea on exertion, cough, or hemoptysis  Cardiovascular: negative for,  chest pain, dyspnea on exertion, lower extremity edema, and exercise intolerance  Gastrointestinal: negative for, nausea, vomiting, constipation, and diarrhea  Genitourinary: negative for, nocturia, dysuria, and frequency  Musculoskeletal: negative for, muscular weakness, nocturnal cramping, and foot pain  Neurologic: negative for, local weakness, numbness or tingling of hands, and numbness or tingling of feet  Psychiatric: negative  Hematologic/Lymphatic/Immunologic: negative    Past Medical History  Past Medical History:   Diagnosis Date    Asthma     Diabetes mellitus (H)     Neuropathy     RA (rheumatoid arthritis) (H)        Medications  Current Outpatient Medications   Medication Sig Dispense Refill    ASPIRIN LOW DOSE 81 MG chewable tablet CHEW AND SWALLOW ONE TABLET BY MOUTH EVERY DAY      clindamycin (CLEOCIN) 75 MG/5ML solution       clopidogrel (PLAVIX) 75 MG tablet Take 1 tablet (75 mg) by mouth daily 60 tablet 0    Continuous Blood Gluc  (DEXCOM G7 ) GUY Use to read blood sugars as per 's instructions. 1 each 0    Continuous Blood Gluc Sensor (DEXCOM G7 SENSOR) MISC Change every 10 days. 3 each 5    empagliflozin (JARDIANCE) 25 MG TABS tablet Take 25 mg by mouth daily      fluticasone (FLOVENT HFA) 220 MCG/ACT inhaler Inhale 1 puff into the lungs 2 times daily 30 g 0    furosemide (LASIX) 20 MG tablet Take 20 mg by mouth daily      gabapentin (NEURONTIN) 100 MG capsule TAKE 1 CAP AT BEDTIME FOR 3 DAYS, THEN 2 CAPS AT BEDTIME FOR 3 DAYS THEN 3 CAPS AT BEDTIME 90 capsule 0    insulin aspart (NOVOLOG FLEXPEN) 100 UNIT/ML injection Inject Subcutaneous 3 times daily (with meals)      levothyroxine (SYNTHROID/LEVOTHROID) 50 MCG tablet Take 50 mcg by mouth      lisinopril (PRINIVIL/ZESTRIL) 5 MG tablet Take 5 mg by mouth      methocarbamol (ROBAXIN) 500 MG tablet Take 500 mg by mouth      metoprolol succinate ER (TOPROL XL) 25 MG 24 hr tablet Take 25 mg by mouth daily       nitroGLYcerin (NITROSTAT) 0.4 MG sublingual tablet Place 0.4 mg under the tongue      omeprazole (PRILOSEC) 20 MG DR capsule Take 2 capsules (40 mg) by mouth daily 30 capsule 3    ondansetron (ZOFRAN ODT) 4 MG ODT tab Take 1 tablet (4 mg) by mouth every 8 hours as needed for nausea 10 tablet 0    Pregabalin (LYRICA PO)       rosuvastatin (CRESTOR) 20 MG tablet Take 20 mg by mouth at bedtime      rosuvastatin (CRESTOR) 40 MG tablet Take 40 mg by mouth daily      semaglutide (OZEMPIC) 2 MG/3ML pen Inject 0.25 mg subcutaneously every 7 days 3 mL 0    SEMGLEE, YFGN, 100 UNIT/ML SOPN Inject 50 Units Subcutaneous daily         Allergies  Allergies   Allergen Reactions    Cortisone     Penicillins     Statins Other (See Comments)     Side effect: myalgias. Ok with zocor only         Family History  family history includes Glaucoma in an other family member; Seizure Disorder in his mother.    Social History  Social History     Tobacco Use    Smoking status: Never     Passive exposure: Never    Smokeless tobacco: Never    Tobacco comments:     NA   Vaping Use    Vaping status: Never Used   Substance Use Topics    Alcohol use: No    Drug use: No       Physical Exam  /84 (BP Location: Left arm, Patient Position: Sitting, Cuff Size: Adult Regular)   Pulse 105   Resp 16   Wt 83.5 kg (184 lb)   SpO2 98%   BMI 30.15 kg/m    Body mass index is 30.15 kg/m .  GENERAL :  In no apparent distress. He is accompanied by his wife.  SKIN: Normal color, normal temperature, texture.  No hirsutism, alopecia or purple striae.     EYES: PERRL, EOMI, No scleral icterus,  No proptosis, conjunctival redness, stare, retraction  RESP: Lungs clear to auscultation bilaterally  CARDIAC: Regular rate and rhythm, normal S1 S2, without murmurs, rubs or gallops    NEURO: awake, alert, responds appropriately to questions.  Cranial nerves intact.   Moves all extremities; Gait normal.  No tremor of the outstretched hand.    EXTREMITIES: No clubbing,  cyanosis or edema.    DATA REVIEW  We were unable to download his Dexcom report

## 2024-08-22 NOTE — PATIENT INSTRUCTIONS
Welcome to the [facility] Endocrinology and Diabetes Clinics     Our Endocrinology Clinics are here to provide you with a team-based, collaborative approach in the diagnosis and treatment of patients with diabetes and endocrine disorders. The team is made up of Physicians, Physician Assistants, Certified Diabetes Educators, Registered Nurses, Medical Assistants, Emergency Medical Technicians, and many others, all of whom have the unified goal of providing our patients with high quality care.     Please see below for some helpful tips to best navigate and use the [facility] Endocrinology clinic:     [place] Respect: At [facility], we are committed to a respectful and safe space for all patients, visitors, and staff.  We believe that mutual respect between patients and their care team is the foundation of quality care.  It is our expectation that you will be treated with respect by your care team.  In turn, we ask that all communication with the care team (written and verbal) be respectful and free from profanity, threatening, or abusive language.  Disrespectful communication undermines our therapeutic relationship with you and may result in us being unable to continue to provide your care.    Refills: A provider must see you at least annually to prescribe and refill medications. This is to ensure your safety as well as meet insurance and compliance regulations.    Scheduling: Many of our Providers offer both in-person or video visits. Please call to schedule any needed follow ups as soon as possible because our provider schedules fill up very quickly. Our care team has the right to require an in-person visit when they believe that it is medically necessary. Please remember that for any virtual visits, you must be in the [facility] at the time of the visit, otherwise we are unable to see you and you will need to be rescheduled.    Missed Appointments: If you need to cancel or miss your  scheduled appointment, please call the clinic at [phone] to reschedule.  Please note if you repeatedly miss appointments or repeatedly miss appointments without calling to inform us ahead of time (no-show), the clinic may elect to not allow you to reschedule without speaking to a manager, may require a Partnership In Care Agreement prior to rescheduling, or could result in you no longer being able to receive care from the clinic. Providing the clinic with timely notification if you have to miss an appointment, allows us to better serve the needs of all of our patients.    Primary Care Provider: Our Endocrinologists are Specialists in their field. We expect you to have a Primary Care Provider established to handle any needs outside of your diabetes and endocrine care.  We would be happy to assist you find a Primary Care Provider, if you do not have one.    GEO'Supp: [employer] is a wonderful resource that allows you access to your Care Team via online or the nicola. Please ask a member of the team if you would like help creating an account. Please note that it may take up to 2 business days for a response. [employer] messages are not reviewed on weekends or after business hours.  Emergent or urgent care needs should never be communicated via [employer].  If you experience a medical emergency call 911 or go to the nearest emergency room.    Labs: It is recommended that you stay within the [facility] System for labs but you are welcome to obtain ordered labs (with some exceptions) from any location of your choice as long as they are able to complete and process the needed labs. If you need us to fax orders to your preferred lab, please provide us the name and fax number of the lab you would like to go to so we can fax the orders. If your labs are drawn outside of the [facility], please have them fax the results to [phone] ([place]) or [phone] ([place]) or via [facility]. It is your  responsibility to ensure that outside lab results are sent to us.    We look forward to working with you. Please do not hesitate to reach out with any questions.    Thank you,    The Endocrine Team    [facility] Address:   [place] Address:     [address] [place] [address]    Phone: [phone]  Fax: [phone] [address] [place] [address]    Phone: [phone]  Fax: [phone]     [facility] Cost Estimate Phone Number: [phone]    General Lab and Imaging Scheduling Phone Number: [phone]

## 2024-08-22 NOTE — LETTER
8/22/2024      Israel Brown  [address] [place] [address]      Dear Colleague,    Thank you for referring your patient, Israel Brown, to the [facility]. Please see a copy of my visit note below.    Outcome for 08/19/24 2:31 PM:  Dexcom-mychart sent w/share code   Kathy Chin, [facility]  Adult Endocrinology   [facility]        Assessment/Plan :   Type 2 DM. Israel has been working hard to get his blood sugars under better control. He does feel like the Ozempic is helping but his numbers are still too elevated. We discussed his current medications and we discussed ways to be more consistent with insulin dosing. His wife uses the Omnipod insulin pump and it has made a significant improvement in her blood sugar control. Israel is not interested in pumps with tubing but he would be interested in trying the Omnipod. I really feel like this will help with his overall compliance and blood sugar control. We will also continue to titrate the Ozempic dose and a new prescription for 0.5 mg weekly was sent to his pharmacy. I will place a referral to our CDE team for pump training and we will follow-up in 4 mos.       I have independently reviewed and interpreted labs, imaging as indicated.      Chief complaint:  Israel is a 48 year old male who returns for follow-up of Type 2 DM.    I have reviewed Care Everywhere including [facility], [facility], [facility], [facility], [facility] , [facility], [place] lab reports, imaging reports and provider notes as indicated.      HISTORY OF PRESENT ILLNESS  Israel continues to work on improving his blood sugars. His biggest issue continues to be remembering to take his insulin, every day. He has continued to take 0.25 mg of Ozempic weekly and he feels like this has helped but he is still getting a lot of glucose fluctuation throughout the day. Along with the Ozempic, he is taking 50 unit(s) of Semglee daily and  Jardiance 25 mg daily. He has been monitoring his blood sugars with the Dexcom G7 sensor.    Israel has not had any problems with severe hyperglycemia and/or hypoglycemia. His numbers are improving but still too elevated. He still struggles to remember to take the long acting insulin, consistently. He has set alarms, his wife will try and remind him, but he will still forget. He has not had any problems blurred vision or an increase in numbness/tingling in his feet. He did have an episode chest pain that did not resolve with nitroglycerin, and he was admitted for a couple days for observation. He knows that ongoing hyperglycemia is hard on his heart, so he is determined to get his blood sugars under better control.    Israel was diagnosed with diabetes in 2004. He was started on metformin at the time of diagnosis but the medication didn't agree with him. He states that he just never felt good when taking the medication. He was then started on insulin. He feels like insulin works well, if he can remember to take it. He currently works nights and he will often forget to take insulin when he gets home in the morning. He has also never been able to take meal time insulin, consistently. If he remembers, he takes Lantus 60 unit(s) daily along with Jardiance 10 mg daily. Ozempic was started in April of 2024, to try and help with meal time spikes. His diabetes is complicated by CAD with a history of a non-STEMI, hypothyroidism, hyperlipidemia, HTN, GERD, KEKE, and ischemic cardiomyopathy. In August of 2024 he was admitted with heart attack like symptoms.     Endocrine relevant labs are as follows:   Latest Reference Range & Units 08/22/24 12:33   Afinion Hemoglobin A1c POCT <=5.7 % 10.5 (H)   (H): Data is abnormally high   Latest Reference Range & Units 04/04/24 07:57   Hemoglobin A1C 0.0 - 5.6 % 12.3 (H)   (H): Data is abnormally high   Latest Reference Range & Units 04/04/24 07:57   Albumin Urine mg/g Cr 0.00 - 17.00 mg/g Cr  144.02 (H)   (H): Data is abnormally high   Latest Reference Range & Units 04/04/24 07:57   Albumin Urine mg/L mg/L 106.0     REVIEW OF SYSTEMS    Endocrine: positive for diabetes  Skin: negative  Eyes: negative for, visual blurring, redness, tearing  Ears/Nose/Throat: negative  Respiratory: No shortness of breath, dyspnea on exertion, cough, or hemoptysis  Cardiovascular: negative for, chest pain, dyspnea on exertion, lower extremity edema, and exercise intolerance  Gastrointestinal: negative for, nausea, vomiting, constipation, and diarrhea  Genitourinary: negative for, nocturia, dysuria, and frequency  Musculoskeletal: negative for, muscular weakness, nocturnal cramping, and foot pain  Neurologic: negative for, local weakness, numbness or tingling of hands, and numbness or tingling of feet  Psychiatric: negative  Hematologic/Lymphatic/Immunologic: negative    Past Medical History  Past Medical History:   Diagnosis Date     Asthma      Diabetes mellitus (H)      Neuropathy      RA (rheumatoid arthritis) (H)        Medications  Current Outpatient Medications   Medication Sig Dispense Refill     ASPIRIN LOW DOSE 81 MG chewable tablet CHEW AND SWALLOW ONE TABLET BY MOUTH EVERY DAY       clindamycin (CLEOCIN) 75 MG/5ML solution        clopidogrel (PLAVIX) 75 MG tablet Take 1 tablet (75 mg) by mouth daily 60 tablet 0     Continuous Blood Gluc  (DEXCOM G7 ) GUY Use to read blood sugars as per 's instructions. 1 each 0     Continuous Blood Gluc Sensor (DEXCOM G7 SENSOR) MISC Change every 10 days. 3 each 5     empagliflozin (JARDIANCE) 25 MG TABS tablet Take 25 mg by mouth daily       fluticasone (FLOVENT HFA) 220 MCG/ACT inhaler Inhale 1 puff into the lungs 2 times daily 30 g 0     furosemide (LASIX) 20 MG tablet Take 20 mg by mouth daily       gabapentin (NEURONTIN) 100 MG capsule TAKE 1 CAP AT BEDTIME FOR 3 DAYS, THEN 2 CAPS AT BEDTIME FOR 3 DAYS THEN 3 CAPS AT BEDTIME 90 capsule 0      insulin aspart (NOVOLOG FLEXPEN) 100 UNIT/ML injection Inject Subcutaneous 3 times daily (with meals)       levothyroxine (SYNTHROID/LEVOTHROID) 50 MCG tablet Take 50 mcg by mouth       lisinopril (PRINIVIL/ZESTRIL) 5 MG tablet Take 5 mg by mouth       methocarbamol (ROBAXIN) 500 MG tablet Take 500 mg by mouth       metoprolol succinate ER (TOPROL XL) 25 MG 24 hr tablet Take 25 mg by mouth daily       nitroGLYcerin (NITROSTAT) 0.4 MG sublingual tablet Place 0.4 mg under the tongue       omeprazole (PRILOSEC) 20 MG DR capsule Take 2 capsules (40 mg) by mouth daily 30 capsule 3     ondansetron (ZOFRAN ODT) 4 MG ODT tab Take 1 tablet (4 mg) by mouth every 8 hours as needed for nausea 10 tablet 0     Pregabalin (LYRICA PO)        rosuvastatin (CRESTOR) 20 MG tablet Take 20 mg by mouth at bedtime       rosuvastatin (CRESTOR) 40 MG tablet Take 40 mg by mouth daily       semaglutide (OZEMPIC) 2 MG/3ML pen Inject 0.25 mg subcutaneously every 7 days 3 mL 0     SEMGLEE, YFGN, 100 UNIT/ML SOPN Inject 50 Units Subcutaneous daily         Allergies  Allergies   Allergen Reactions     Cortisone      Penicillins      Statins Other (See Comments)     Side effect: myalgias. Ok with zocor only         Family History  family history includes Glaucoma in an other family member; Seizure Disorder in his mother.    Social History  Social History     Tobacco Use     Smoking status: Never     Passive exposure: Never     Smokeless tobacco: Never     Tobacco comments:     NA   Vaping Use     Vaping status: Never Used   Substance Use Topics     Alcohol use: No     Drug use: No       Physical Exam  /84 (BP Location: Left arm, Patient Position: Sitting, Cuff Size: Adult Regular)   Pulse 105   Resp 16   Wt 83.5 kg (184 lb)   SpO2 98%   BMI 30.15 kg/m    Body mass index is 30.15 kg/m .  GENERAL :  In no apparent distress. He is accompanied by his wife.  SKIN: Normal color, normal temperature, texture.  No hirsutism, alopecia or purple  striae.     EYES: PERRL, EOMI, No scleral icterus,  No proptosis, conjunctival redness, stare, retraction  RESP: Lungs clear to auscultation bilaterally  CARDIAC: Regular rate and rhythm, normal S1 S2, without murmurs, rubs or gallops    NEURO: awake, alert, responds appropriately to questions.  Cranial nerves intact.   Moves all extremities; Gait normal.  No tremor of the outstretched hand.    EXTREMITIES: No clubbing, cyanosis or edema.    DATA REVIEW  We were unable to download his Dexcom report        Again, thank you for allowing me to participate in the care of your patient.        Sincerely,        Ely Tran PA-C

## 2024-08-22 NOTE — NURSING NOTE
Israel Brown's goals for this visit include:   Chief Complaint   Patient presents with    Follow Up    Diabetes       He requests these members of his care team be copied on today's visit information: yes    PCP: Lloyd Dunn    Referring Provider:  Referred Self, MD  No address on file    /84 (BP Location: Left arm, Patient Position: Sitting, Cuff Size: Adult Regular)   Pulse 105   Resp 16   Wt 83.5 kg (184 lb)   SpO2 98%   BMI 30.15 kg/m      Do you need any medication refills at today's visit? None    Dimitri Mckee, EMT

## 2024-08-23 ENCOUNTER — TELEPHONE (OUTPATIENT)
Dept: CARDIOLOGY | Facility: CLINIC | Age: 49
End: 2024-08-23
Payer: COMMERCIAL

## 2024-08-23 NOTE — TELEPHONE ENCOUNTER
8/23 Left Voicemail (1st Attempt) and Sent Mychart (1st Attempt) for the patient to call back and schedule the following:    Appointment type: Return Cardiology  Provider: General [facility]  Return date: next available  Specialty phone number: [phone] opt 1  Additional appointment(s) needed: n/a  Additonal Notes: n/a

## 2024-08-23 NOTE — TELEPHONE ENCOUNTER
[facility] Call Center    Phone Message    May a detailed message be left on voicemail: yes     Reason for Call: Other: Israel was admitted to the hospital on 8/14/24 after having a mild heart attack.  He was discharged on 8/21/24 and told to follow up right away with his cardiologist.  Scheduled him for soonest available with Dr. Kwan (11/25/24) and put him on the wait list.  Please evaluate whether or not Israel should be seen sooner and call him back to further discuss.     Action Taken: Other: Cardiology    Travel Screening: Not Applicable     Thank you!  Specialty Access Center

## 2024-08-26 NOTE — TELEPHONE ENCOUNTER
Called and left a message offering a sooner appointment with Tamara Thomas 9/3.    ANUPAMA DavisA

## 2024-08-28 ENCOUNTER — TELEPHONE (OUTPATIENT)
Dept: CARDIOLOGY | Facility: CLINIC | Age: 49
End: 2024-08-28
Payer: COMMERCIAL

## 2024-08-28 NOTE — TELEPHONE ENCOUNTER
8/28 Left Voicemail (2nd Attempt) and Sent Mychart (2nd Attempt) for the patient to call back and schedule the following:    Appointment type: Return Cardiology  Provider: William Lainez or [place]  Return date: next available   Specialty phone number: [phone] opt 1  Additional appointment(s) needed: n/a  Additonal Notes: Check locations for sooner appts ([facility], [facility] or [facility])

## 2024-08-30 ENCOUNTER — OFFICE VISIT (OUTPATIENT)
Dept: EDUCATION SERVICES | Facility: CLINIC | Age: 49
End: 2024-08-30
Attending: PHYSICIAN ASSISTANT
Payer: COMMERCIAL

## 2024-08-30 ENCOUNTER — MYC MEDICAL ADVICE (OUTPATIENT)
Dept: EDUCATION SERVICES | Facility: CLINIC | Age: 49
End: 2024-08-30

## 2024-08-30 DIAGNOSIS — Z79.4 TYPE 2 DIABETES MELLITUS WITH DIABETIC NEUROPATHY, WITH LONG-TERM CURRENT USE OF INSULIN (H): ICD-10-CM

## 2024-08-30 DIAGNOSIS — E11.40 TYPE 2 DIABETES MELLITUS WITH DIABETIC NEUROPATHY, WITH LONG-TERM CURRENT USE OF INSULIN (H): ICD-10-CM

## 2024-08-30 PROCEDURE — G0108 DIAB MANAGE TRN  PER INDIV: HCPCS | Performed by: DIETITIAN, REGISTERED

## 2024-08-30 NOTE — PROGRESS NOTES
Diabetes Self-Management Education & Support    Presents for: CGM Review    Type of Service: In Person Visit    Insulin Pump Concepts:  Determining bolus insulin needs.  Not currently taking mealtime insulin.  Patient not ready/willing to learn carbohydrate counting.  Is agreeable to defining small, usual, and large carbohydrate meals.  Agrees to work to document about a week's worth of intake prior to next visit to discuss/review together in order to determine fixed starting mealtime doses for Omnipod 5 start.      Opportunities for ongoing education and support in diabetes-self management were discussed.    Pt verbalized understanding of concepts discussed and recommendations provided today.       Continue education with the following diabetes management concepts: Healthy Eating, Monitoring, Taking Medication, and Insulin Pump Concepts Problem solving with insulin pump therapy (BG monitoring; hypoglycemia signs/symptoms, treatment (glucagon) and prevention; hyperglycemia signs/symptoms, treatment and prevention; ketones, DKA signs/symptoms and prevention)  Hands on practice with basic pump button use     ASSESSMENT:  Patient here today per referral from JOSE Wheeler.  Patient desiring to start the Omnipod in order to improve his glycemic control.  Not open to discussion of any other pumps as he cannot have tubing because he is in the [employer]. Based on the glucose data viewed on patient's Dexcom G7 nicola today, patient IS meeting glucose goals for:    -time below 70 mg/dL of less than 4%   -time below 54 mg/dL of less than 1%  Patient IS NOT meeting goals for:    -time in  mg/dL target of above 70%   -time above 180 mg/dL of less than 25%   -time above 250 mg/dL of less than 5%    Was unable to successfully view patient's in Dexcom Clarity.  Somehow in the [employer] nicola the active profile (username is wife's name) on his phone has his wife's demographic information (name, , etc).  Additionally when  patient logged into Clarity with his phone number, two profiles are associated with his phone number: one with his wife's name (believed to be the same one currently active in his Dexcom G7 nicola) and a second with his phone number as username. Despite speaking with Dexcom technical support during visit, there is not a way to update the profiles nor log out of the G7 nicola and log in with the profile with his phone number as username.  As best patient and writer understand, when current sensor session complete, patient to delete Dexcom G7 nicola and re-install.  He should log in to the Dexcom G7 nicola with the profile with username of his phone number.  Also reached out to Dexcom rep and am awaiting follow up.    Has not yet increased Ozempic dose as instructed at last visit with JOSE Wheeler.  He agrees to do that when next dose is due.     PLAN   Diabetes Medications   -increase Ozempic to 0.5 mg once a week (Sundays)   -continue Semglee at 60 units once a day   -continue Jardiance as 1 tablet (25 mg) once a day    2.  Keep a 7 day food record: make some notes and take pictures   We want to identify small, usual and large carbohydrate meals    3. Keep something with you for treating a low blood sugar (70 mg/dL or below).  If your blood sugar is low, eat/drink ONE of the following:   -1/2 cup juice  -1/2 cup regular soda  -1 package fruit snacks  -4 glucose tablets  Then, wait 15 minutes and re-check blood sugar.  If it is not above 70 mg/dL, repeat the above.    4.   Please call or send a [employer] message with any questions or concerns or low blood sugars.    Topics to cover at upcoming visits: Healthy Eating, Monitoring, Taking Medication, and Insulin Pump Concepts Problem solving with insulin pump therapy (BG monitoring; hypoglycemia signs/symptoms, treatment (glucagon) and prevention; hyperglycemia signs/symptoms, treatment and prevention; ketones, DKA signs/symptoms and prevention)  Hands on practice with basic pump  "button use    Follow-up: 9/27/24    See Care Plan for co-developed, patient-state behavior change goals.  AVS provided for patient today.    Education Materials Provided:  No new materials provided today    SUBJECTIVE/OBJECTIVE:  Presents for: CGM Review  Accompanied by: Self, Spouse  Diabetes education in the past 24mo: No  Focus of Visit: Insulin Pump, CGM  Type of Pump visit: Pre-pump  Type of CGM visit: Personal CGM Follow-up  Diabetes type: Type 2  Date of diagnosis: 2004  How confident are you filling out medical forms by yourself:: Not Assessed  Transportation concerns: No  Difficulty affording diabetes medication?: No  Difficulty affording diabetes testing supplies?: No  Other concerns:: Glasses  Cultural Influences/Ethnic Background:  Not  or     Diabetes Symptoms & Complications:  Diabetes Related Symptoms: Neuropathy  Weight trend: Stable  Complications assessed today?: No    Patient Problem List and Family Medical History reviewed for relevant medical history, current medical status, and diabetes risk factors.    Vitals:  There were no vitals taken for this visit.  Estimated body mass index is 30.15 kg/m  as calculated from the following:    Height as of 6/19/24: 1.664 m (5' 5.5\").    Weight as of 8/22/24: 83.5 kg (184 lb).   Last 3 BP:   BP Readings from Last 3 Encounters:   08/22/24 127/84   06/24/24 98/68   06/19/24 127/88       History   Smoking Status    Never   Smokeless Tobacco    Never       Labs:  Lab Results   Component Value Date    A1C 10.5 08/22/2024    A1C 12.3 04/04/2024    A1C 9.6 01/24/2018     Lab Results   Component Value Date     04/04/2024     03/24/2021     Lab Results   Component Value Date    LDL 70 04/04/2024     Direct Measure HDL   Date Value Ref Range Status   04/04/2024 47 >=40 mg/dL Final   ]  GFR Estimate   Date Value Ref Range Status   04/04/2024 87 >60 mL/min/1.73m2 Final   03/24/2021 >90 >60 mL/min/[1.73_m2] Final     Comment:     Non  " "American GFR Calc  Starting 12/18/2018, serum creatinine based estimated GFR (eGFR) will be   calculated using the Chronic Kidney Disease Epidemiology Collaboration   (CKD-EPI) equation.       GFR Estimate If Black   Date Value Ref Range Status   03/24/2021 >90 >60 mL/min/[1.73_m2] Final     Comment:      GFR Calc  Starting 12/18/2018, serum creatinine based estimated GFR (eGFR) will be   calculated using the Chronic Kidney Disease Epidemiology Collaboration   (CKD-EPI) equation.       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 : Yes  Blood Glucose Meter: CGM (Dexcom G7)    Per patient's Dexcom G7 nicola:    For the past 14 days:     64% above 250 mg/dL  26% above 180 mg/dL  10% in  mg/dL target  0% below 70 mg/dL  0% below 53 mg/dL    Taking Medications:  Diabetes Medication(s)       Insulin       SEMGLEE, YFGN, 100 UNIT/ML SOPN Inject 60 Units subcutaneously daily.     insulin aspart (NOVOLOG FLEXPEN) 100 UNIT/ML injection Inject Subcutaneous 3 times daily (with meals)     Patient not taking: Reported on 8/30/2024       Sodium-Glucose Co-Transporter 2 (SGLT2) Inhibitors       empagliflozin (JARDIANCE) 25 MG TABS tablet Take 25 mg by mouth daily       Incretin Mimetic Agents       semaglutide (OZEMPIC) 2 MG/3ML pen Inject 0.5 mg subcutaneously every 7 days.     Patient taking differently: Inject 0.25 mg subcutaneously every 7 days.            Taking Medication Assessed Today: Yes  Current Treatments: " Oral Medication (taken by mouth), Non-insulin Injectables, Insulin Injections  Given by: Patient  Injection/Infusion sites: Abdomen  Problems taking diabetes medications regularly?: Yes (misses a couple times per month)  Diabetes medication side effects?: No    Problem Solving:  Problem Solving Assessed Today: Yes  Is the patient at risk for hypoglycemia?: Yes  Hypoglycemia Frequency:  (none in the past 2 months)  Hypoglycemia Treatment: Candy, Other food    Hypoglycemia Symptoms  Hypoglycemia: Sweats    Reducing Risks:  Reducing Risks Assessed Today: Yes  Diabetes Risks: Age over 45 years, Hyperlipidemia  CAD Risks: Male sex, Dyslipidemia, Diabetes Mellitus, Obesity  Has dilated eye exam at least once a year?: Yes  Sees dentist every 6 months?: No  Feet checked by healthcare provider in the last year?: Yes    Healthy Coping:  Healthy Coping Assessed Today: Yes  Emotional response to diabetes: Anger, Helplessness  Informal Support system:: Spouse  Stage of change: PREPARATION (Decided to change - considering how)  Patient Activation Measure Survey Score:      1/28/2020     7:25 AM   MYLENE Score (Last Two)   MYLENE Raw Score 30   Activation Score 56   MYLENE Level 3         Care Plan and Education Provided:  Taking Medication: Ozempic dosing  Healthy Eating: need to define small/usual/large carbohydrate meals for patient.    Aidee Sotelo, MPH, RD, CDCES, LD 8/30/2024    Time Spent: 60 minutes  Encounter Type: Individual    Any diabetes medication dose changes were made via the CDE Protocol per the patient's referring provider. A copy of this encounter was shared with the provider.

## 2024-08-30 NOTE — LETTER
8/30/2024         RE: Israel Brown  [address] [place] [address]        Dear Colleague,    Thank you [facility] your [facility], [facility] Brown[facility] Please see a copy of my visit note below.    Diabetes Self-Management Education & Support    Presents for: CGM Review    Type of Service: In Person Visit    Insulin Pump Concepts:  Determining bolus insulin needs.  Not currently taking mealtime insulin.  Patient not ready/willing to learn carbohydrate counting.  Is agreeable to defining small, usual, and large carbohydrate meals.  Agrees to work to document about a week's worth of intake prior to next visit to discuss/review together in order to determine fixed starting mealtime doses for Omnipod 5 start.      Opportunities for ongoing education and support in diabetes-self management were discussed.    Pt verbalized understanding of concepts discussed and recommendations provided today.       Continue education with the following diabetes management concepts: Healthy Eating, Monitoring, Taking Medication, and Insulin Pump Concepts Problem solving with insulin pump therapy (BG monitoring; hypoglycemia signs/symptoms, treatment (glucagon) and prevention; hyperglycemia signs/symptoms, treatment and prevention; ketones, DKA signs/symptoms and prevention)  Hands on practice with basic pump button use     ASSESSMENT:  Patient here today per referral from JOSE Wheeler.  Patient desiring to start the Omnipod in order to improve his glycemic control.  Not open to discussion of any other pumps as he cannot have tubing because he is in the [employer]. Based on the glucose data viewed on patient's Dexcom G7 nicola today, patient IS meeting glucose goals for:    -time below 70 mg/dL of less than 4%   -time below 54 mg/dL of less than 1%  Patient IS NOT meeting goals for:    -time in  mg/dL target of above 70%   -time above 180 mg/dL of less than 25%   -time  above 250 mg/dL of less than 5%    Was unable to successfully view patient's in Dexcom Clarity.  Somehow in the G7 nicola the active profile (username is wife's name) on his phone has his wife's demographic information (name, , etc).  Additionally when patient logged into Clarity with his phone number, two profiles are associated with his phone number: one with his wife's name (believed to be the same one currently active in his Dexcom G7 nicola) and a second with his phone number as username. Despite speaking with Dexcom technical support during visit, there is not a way to update the profiles nor log out of the G7 nicola and log in with the profile with his phone number as username.  As best patient and writer understand, when current sensor session complete, patient to delete Dexcom G7 nicola and re-install.  He should log in to the Dexcom G7 nicola with the profile with username of his phone number.  Also reached out to Dexcom rep and am awaiting follow up.    Has not yet increased Ozempic dose as instructed at last visit with JOSE Wheeler.  He agrees to do that when next dose is due.     PLAN   Diabetes Medications   -increase Ozempic to 0.5 mg once a week (Sundays)   -continue Semglee at 60 units once a day   -continue Jardiance as 1 tablet (25 mg) once a day    2.  Keep a 7 day food record: make some notes and take pictures   We want to identify small, usual and large carbohydrate meals    3. Keep something with you for treating a low blood sugar (70 mg/dL or below).  If your blood sugar is low, eat/drink ONE of the following:   -1/2 cup juice  -1/2 cup regular soda  -1 package fruit snacks  -4 glucose tablets  Then, wait 15 minutes and re-check blood sugar.  If it is not above 70 mg/dL, repeat the above.    4.   Please call or send a [employer] message with any questions or concerns or low blood sugars.    Topics to cover at upcoming visits: Healthy Eating, Monitoring, Taking Medication, and Insulin Pump Concepts Problem  "solving with insulin pump therapy (BG monitoring; hypoglycemia signs/symptoms, treatment (glucagon) and prevention; hyperglycemia signs/symptoms, treatment and prevention; ketones, DKA signs/symptoms and prevention)  Hands on practice with basic pump button use    Follow-up: 9/27/24    See Care Plan for co-developed, patient-state behavior change goals.  AVS provided for patient today.    Education Materials Provided:  No new materials provided today    SUBJECTIVE/OBJECTIVE:  Presents for: CGM Review  Accompanied by: Self, Spouse  Diabetes education in the past 24mo: No  Focus of Visit: Insulin Pump, CGM  Type of Pump visit: Pre-pump  Type of CGM visit: Personal CGM Follow-up  Diabetes type: Type 2  Date of diagnosis: 2004  How confident are you filling out medical forms by yourself:: Not Assessed  Transportation concerns: No  Difficulty affording diabetes medication?: No  Difficulty affording diabetes testing supplies?: No  Other concerns:: Glasses  Cultural Influences/Ethnic Background:  Not  or     Diabetes Symptoms & Complications:  Diabetes Related Symptoms: Neuropathy  Weight trend: Stable  Complications assessed today?: No    Patient Problem List and Family Medical History reviewed for relevant medical history, current medical status, and diabetes risk factors.    Vitals:  There were no vitals taken for this visit.  Estimated body mass index is 30.15 kg/m  as calculated from the following:    Height as of 6/19/24: 1.664 m (5' 5.5\").    Weight as of 8/22/24: 83.5 kg (184 lb).   Last 3 BP:   BP Readings from Last 3 Encounters:   08/22/24 127/84   06/24/24 98/68   06/19/24 127/88       History   Smoking Status     Never   Smokeless Tobacco     Never       Labs:  Lab Results   Component Value Date    A1C 10.5 08/22/2024    A1C 12.3 04/04/2024    A1C 9.6 01/24/2018     Lab Results   Component Value Date     04/04/2024     03/24/2021     Lab Results   Component Value Date    LDL 70 " "04/04/2024     Direct Measure HDL   Date Value Ref Range Status   04/04/2024 47 >=40 mg/dL Final   ]  GFR Estimate   Date Value Ref Range Status   04/04/2024 87 >60 mL/min/1.73m2 Final   03/24/2021 >90 >60 mL/min/[1.73_m2] Final     Comment:     Non  GFR Calc  Starting 12/18/2018, serum creatinine based estimated GFR (eGFR) will be   calculated using the Chronic Kidney Disease Epidemiology Collaboration   (CKD-EPI) equation.       GFR Estimate If Black   Date Value Ref Range Status   03/24/2021 >90 >60 mL/min/[1.73_m2] Final     Comment:      GFR Calc  Starting 12/18/2018, serum creatinine based estimated GFR (eGFR) will be   calculated using the Chronic Kidney Disease Epidemiology Collaboration   (CKD-EPI) equation.       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 : Yes  Blood Glucose Meter: CGM (Dexcom G7)    Per patient's Dexcom G7 nicola:    For the past 14 days:     64% above 250 mg/dL  26% above 180 mg/dL  10% in  mg/dL target  0% below 70 mg/dL  0% below 53 mg/dL    Taking Medications:  Diabetes Medication(s)       Insulin       SEMGLEE, YFGN, 100 UNIT/ML SOPN Inject 60 Units subcutaneously daily.     insulin aspart (NOVOLOG FLEXPEN) 100 UNIT/ML injection Inject Subcutaneous 3 times daily (with meals)     Patient not taking: Reported on 8/30/2024       Sodium-Glucose Co-Transporter 2 (SGLT2) Inhibitors       empagliflozin (JARDIANCE) 25 MG TABS tablet Take 25 mg by mouth daily    "    Incretin Mimetic Agents       semaglutide (OZEMPIC) 2 MG/3ML pen Inject 0.5 mg subcutaneously every 7 days.     Patient taking differently: Inject 0.25 mg subcutaneously every 7 days.            Taking Medication Assessed Today: Yes  Current Treatments: Oral Medication (taken by mouth), Non-insulin Injectables, Insulin Injections  Given by: Patient  Injection/Infusion sites: Abdomen  Problems taking diabetes medications regularly?: Yes (misses a couple times per month)  Diabetes medication side effects?: No    Problem Solving:  Problem Solving Assessed Today: Yes  Is the patient at risk for hypoglycemia?: Yes  Hypoglycemia Frequency:  (none in the past 2 months)  Hypoglycemia Treatment: Candy, Other food    Hypoglycemia Symptoms  Hypoglycemia: Sweats    Reducing Risks:  Reducing Risks Assessed Today: Yes  Diabetes Risks: Age over 45 years, Hyperlipidemia  CAD Risks: Male sex, Dyslipidemia, Diabetes Mellitus, Obesity  Has dilated eye exam at least once a year?: Yes  Sees dentist every 6 months?: No  Feet checked by healthcare provider in the last year?: Yes    Healthy Coping:  Healthy Coping Assessed Today: Yes  Emotional response to diabetes: Anger, Helplessness  Informal Support system:: Spouse  Stage of change: PREPARATION (Decided to change - considering how)  Patient Activation Measure Survey Score:      1/28/2020     7:25 AM   MYLENE Score (Last Two)   MYLENE Raw Score 30   Activation Score 56   MYLENE Level 3         Care Plan and Education Provided:  Taking Medication: Ozempic dosing  Healthy Eating: need to define small/usual/large carbohydrate meals for patient.    Aidee Sotelo, MPH, RD, CDCES, LD 8/30/2024    Time Spent: 60 minutes  Encounter Type: Individual    Any diabetes medication dose changes were made via the CDE Protocol per the patient's referring provider. A copy of this encounter was shared with the provider.

## 2024-08-30 NOTE — PATIENT INSTRUCTIONS
Diabetes Medications   -increase Ozempic to 0.5 mg once a week (Sundays)   -continue Semglee at 60 units once a day   -continue Jardiance as 1 tablet (25 mg) once a day    2.  Keep a 7 day food record: make some notes and take pictures   We want to identify small, usual and large carbohydrate meals    3. Keep something with you for treating a low blood sugar (70 mg/dL or below).  If your blood sugar is low, eat/drink ONE of the following:   -1/2 cup juice  -1/2 cup regular soda  -1 package fruit snacks  -4 glucose tablets  Then, wait 15 minutes and re-check blood sugar.  If it is not above 70 mg/dL, repeat the above.    4.  Follow up with Aidee on  at 2 PM   Bring your food records with you.     5.  Please call or send a [employer] message with any questions or concerns or low blood sugars.    Aidee Sotelo, MPH, RD, LETTY [facility]OUGH  Diabetes Education Triage Line: [phone]  Diabetes Education Appointment [phone]

## 2024-08-31 ENCOUNTER — TELEPHONE (OUTPATIENT)
Dept: CARDIOLOGY | Facility: CLINIC | Age: 49
End: 2024-08-31
Payer: COMMERCIAL

## 2024-08-31 NOTE — TELEPHONE ENCOUNTER
8/31 LVM (3rd Attempt) and sent a MyChart (3rd Attempt) for the patient to call back and schedule the following:    Appointment type: Return Cardiology  Provider: General [facility]BINA ( Migel Elaine or William)  Return date: next available  Specialty phone number: [phone] opt 1  Additional appointment(s) needed: n/a  Additonal Notes: check FK, MG and CSC for sooner appts

## 2024-09-06 DIAGNOSIS — E11.40 TYPE 2 DIABETES MELLITUS WITH DIABETIC NEUROPATHY, WITH LONG-TERM CURRENT USE OF INSULIN (H): Primary | ICD-10-CM

## 2024-09-06 DIAGNOSIS — Z79.4 TYPE 2 DIABETES MELLITUS WITH DIABETIC NEUROPATHY, WITH LONG-TERM CURRENT USE OF INSULIN (H): Primary | ICD-10-CM

## 2024-09-06 RX ORDER — INSULIN PMP CART,AUT,G6/7,CNTR
1 EACH SUBCUTANEOUS
Qty: 1 KIT | Refills: 0 | Status: SHIPPED | OUTPATIENT
Start: 2024-09-06

## 2024-09-06 RX ORDER — INSULIN PMP CART,AUT,G6/7,CNTR
1 EACH SUBCUTANEOUS
Qty: 10 EACH | Refills: 5 | Status: SHIPPED | OUTPATIENT
Start: 2024-09-06

## 2024-09-06 NOTE — TELEPHONE ENCOUNTER
Requested Omnipod 5 orders from endocrinology.  See telephone encounter dated 9/6/24.  Aidee Sotelo, MPH, RD, CDCES, LD 9/6/2024

## 2024-09-06 NOTE — TELEPHONE ENCOUNTER
Patient would like to proceed with Omnipod 5 using the Dexcom G7.  Prescriptions pended.  IF in agreement, please sign orders to pharmacy OR provide alternate plan.  Please note patient has upcoming appointment with writer to discuss food/meal planning so can better determine insulin needs.   Aidee Sotelo, MPH, RD, CDCES, LD 9/6/2024

## 2024-09-11 ENCOUNTER — TELEPHONE (OUTPATIENT)
Dept: CARDIOLOGY | Facility: CLINIC | Age: 49
End: 2024-09-11
Payer: COMMERCIAL

## 2024-09-11 NOTE — TELEPHONE ENCOUNTER
"----- Message from Francisca BRAY sent at 9/11/2024  9:57 AM CDT -----    ----- Message -----  From: Sindy Wolff RN  Sent: 9/11/2024   9:35 AM CDT  To: Francisca Rivas    We can offer him an appt with Tamara next available.  If he does not want to see Tamara then he can keep his nov appt.  He already had a coronary angio/ PCI.  Sindy  ----- Message -----  From: Francisca Rivas  Sent: 9/6/2024   2:42 PM CDT  To:  Cardiology Nurse    Hey! Just an fyi that this patient is scheduled to see Dr. Kwan end of November, last seen 6/2024 and plan was for one year follow-up. Per review, patient is being seen for \"Post-hospital F/U for mild heart attack. Was seen at [facility] on 8/14/24 and was advised to follow-up with cardiologist after being discharged.\" Just wanted to send an fyi since patient is not scheduled until November. Wanted to ensure nothing needed to be done in meantime. Thanks!  "

## 2024-09-11 NOTE — TELEPHONE ENCOUNTER
Attempted to contact patient to offer sooner opening with Tamara Thomas. Phone had infinite ring, unable to LVM.    ROBLES Alexander

## 2024-09-13 NOTE — TELEPHONE ENCOUNTER
Sent patient a mychart if he is wanting to see Tamara sooner otherwise if he wants to stick with Dr. Kwan that is ok too

## 2024-09-14 DIAGNOSIS — E11.40 TYPE 2 DIABETES MELLITUS WITH DIABETIC NEUROPATHY, WITH LONG-TERM CURRENT USE OF INSULIN (H): ICD-10-CM

## 2024-09-14 DIAGNOSIS — Z79.4 TYPE 2 DIABETES MELLITUS WITH DIABETIC NEUROPATHY, WITH LONG-TERM CURRENT USE OF INSULIN (H): ICD-10-CM

## 2024-09-14 DIAGNOSIS — I25.10 CORONARY ARTERY DISEASE INVOLVING NATIVE CORONARY ARTERY OF NATIVE HEART WITHOUT ANGINA PECTORIS: ICD-10-CM

## 2024-09-16 ENCOUNTER — TELEPHONE (OUTPATIENT)
Dept: FAMILY MEDICINE | Facility: CLINIC | Age: 49
End: 2024-09-16
Payer: COMMERCIAL

## 2024-09-16 RX ORDER — GABAPENTIN 100 MG/1
CAPSULE ORAL
Qty: 90 CAPSULE | Refills: 0 | Status: SHIPPED | OUTPATIENT
Start: 2024-09-16

## 2024-09-16 RX ORDER — CLOPIDOGREL BISULFATE 75 MG/1
75 TABLET ORAL DAILY
Qty: 60 TABLET | Refills: 0 | Status: SHIPPED | OUTPATIENT
Start: 2024-09-16

## 2024-09-16 NOTE — TELEPHONE ENCOUNTER
Patient Quality Outreach    Patient is due for the following:   Diabetes -  A1C  Physical Preventive Adult Physical      Topic Date Due    Zoster (Shingles) Vaccine (1 of 2) Never done    Hepatitis B Vaccine (1 of 3 - 19+ 3-dose series) Never done    Flu Vaccine (1) 09/01/2024    COVID-19 Vaccine (5 - 2024-25 season) 09/01/2024       Next Steps:   Schedule a lab only visit for A1C    Type of outreach:    Sent [employer] message.    Next Steps:  Reach out within 90 days via Letter.    Max number of attempts reached: No. Will try again in 90 days if patient still on fail list.    Questions for provider review:    None           Eleno Dolan MA

## 2024-09-19 DIAGNOSIS — Z79.4 TYPE 2 DIABETES MELLITUS WITH DIABETIC NEUROPATHY, WITH LONG-TERM CURRENT USE OF INSULIN (H): ICD-10-CM

## 2024-09-19 DIAGNOSIS — E11.40 TYPE 2 DIABETES MELLITUS WITH DIABETIC NEUROPATHY, WITH LONG-TERM CURRENT USE OF INSULIN (H): ICD-10-CM

## 2024-09-25 RX ORDER — ACYCLOVIR 400 MG/1
TABLET ORAL
Qty: 3 EACH | Refills: 5 | Status: SHIPPED | OUTPATIENT
Start: 2024-09-25

## 2024-09-25 NOTE — TELEPHONE ENCOUNTER
Medication Requested:  Continuous Blood Gluc Sensor (DEXCOM G7 SENSOR) MISC 3 each 5 4/4/2024 -- --   Sig: Change every 10 days.     ----------------------  Last Office Visit : 8/22/2024  [facility]      Future Office visit:     1/23/2025 11:40 AM (50 min)  [facility]   RETURN DIABETES   [facility] ([facility])   Ely Tran PA-C     ----------------------    Refilled per protocol

## 2024-11-24 NOTE — PROGRESS NOTES
[facility]      Referring provider:Lloyd Dunn APRN CNP       HPI: Mr. Israel Brown is a 48 year old  male with PMH significant for  -GERD   -Obesity  -Type 2 diabetes with history of peripheral neuropathy and retinopathy, not well-controlled most recent hemoglobin A1c 12.3  -Hypertension  -STEMI status post drug-eluting stent to mid LAD/diagonal 1, 11/18/2022  -LVEF 45 to 50% by echocardiogram 2022    Patient is being seen today for history of coronary artery disease.  Patient presented to [facility] hospital 11/18/2022 with chest pain and EKG was consistent with STEMI, underwent coronary angiogram with drug-eluting stent to mid LAD and diagonal 1.  Echo showed LVEF 45 to 50%.  He followed up with cardiology clinic at [facility] until 2023.    He is establishing care with us.  He has no cardiac symptoms at this time.  He works as a .  He sleeps during the day and works at night.  Denies chest pain, shortness of breath, dizziness, palpitations.  He tells me he has some swelling around the ankles.  Lifetime non-smoker.  No alcohol abuse.    Patient currently on aspirin, clopidogrel, Lasix 20 mg?, lisinopril 5 mg, metoprolol 25 mg, Crestor 20 mg    LDL well-controlled.    Medications, personal, family, and social history reviewed with patient and revised.    Interval history 11/25/2024:  Patient presented to [facility] 8/16/2024 with unstable angina for 2 weeks.  High sensitive troponin was mildly elevated at 20.  Underwent coronary angiogram which showed restenosis in the proximal LAD, 70%.  Underwent drug-eluting stent of the proximal LAD.  Mid LAD stent and diagonal stent were patent.  Discharge medications: Zetia 10 mg, Crestor 40 mg, aspirin, Lasix 20 mg, Jardiance 25 mg, lisinopril 5 mg, metoprolol 25 mg, insulin, Plavix 75 mg.  Patient did not undergo echocardiogram during this hospitalization.  Patient is feeling well.  No  recurrent chest discomfort since then.  No chest pain, shortness of breath, syncope.  Sometimes feels leg pain.  He tells me he has neuropathy.  He has mild lower extremity edema.  He is on Lasix.  He has proteinuria.    He works as a security with night shifts.  PAST MEDICAL HISTORY:  Past Medical History:   Diagnosis Date    Asthma     Diabetes mellitus (H)     Neuropathy     RA (rheumatoid arthritis) (H)        CURRENT MEDICATIONS:  Current Outpatient Medications   Medication Sig Dispense Refill    ASPIRIN LOW DOSE 81 MG chewable tablet CHEW AND SWALLOW ONE TABLET BY MOUTH EVERY DAY      clindamycin (CLEOCIN) 75 MG/5ML solution       clopidogrel (PLAVIX) 75 MG tablet TAKE ONE TABLET BY MOUTH EVERY DAY 60 tablet 0    Continuous Blood Gluc  (DEXCOM G7 ) GUY Use to read blood sugars as per 's instructions. 1 each 0    Continuous Glucose Sensor (DEXCOM G7 SENSOR) MISC Change every 10 days. 3 each 5    empagliflozin (JARDIANCE) 25 MG TABS tablet Take 25 mg by mouth daily      fluticasone (FLOVENT HFA) 220 MCG/ACT inhaler Inhale 1 puff into the lungs 2 times daily 30 g 0    furosemide (LASIX) 20 MG tablet Take 20 mg by mouth daily      gabapentin (NEURONTIN) 100 MG capsule TAKE ONE CAPSULE BY MOUTH EVERY DAY AT BEDTIME FOR 3 DAYS, 2 CAPS FOR 3 DAYS, THEN 3 CAPS AT BEDTIME 90 capsule 0    insulin aspart (NOVOLOG FLEXPEN) 100 UNIT/ML injection Inject Subcutaneous 3 times daily (with meals) (Patient not taking: Reported on 8/30/2024)      Insulin Disposable Pump (OMNIPOD 5 G7 INTRO, GEN 5,) KIT 1 pod every 3 days. (Or when 200 units of insulin has been used). See  administration instructions. 1 kit 0    Insulin Disposable Pump (OMNIPOD 5 G7 PODS, GEN 5,) MISC 1 pod every 3 days. (Or when 200 units of insulin has been used).  See  administration instructions. 10 each 5    levothyroxine (SYNTHROID/LEVOTHROID) 50 MCG tablet Take 50 mcg by mouth      lisinopril  (PRINIVIL/ZESTRIL) 5 MG tablet Take 5 mg by mouth      methocarbamol (ROBAXIN) 500 MG tablet Take 500 mg by mouth      metoprolol succinate ER (TOPROL XL) 25 MG 24 hr tablet Take 25 mg by mouth daily      nitroGLYcerin (NITROSTAT) 0.4 MG sublingual tablet Place 0.4 mg under the tongue      omeprazole (PRILOSEC) 20 MG DR capsule Take 2 capsules (40 mg) by mouth daily 30 capsule 3    ondansetron (ZOFRAN ODT) 4 MG ODT tab Take 1 tablet (4 mg) by mouth every 8 hours as needed for nausea 10 tablet 0    Pregabalin (LYRICA PO)       rosuvastatin (CRESTOR) 20 MG tablet Take 20 mg by mouth at bedtime      rosuvastatin (CRESTOR) 40 MG tablet Take 40 mg by mouth daily      semaglutide (OZEMPIC) 2 MG/3ML pen Inject 0.5 mg subcutaneously every 7 days. (Patient taking differently: Inject 0.25 mg subcutaneously every 7 days.) 3 mL 3    SEMGLEE, YFGN, 100 UNIT/ML SOPN Inject 60 Units subcutaneously daily.         PAST SURGICAL HISTORY:  Past Surgical History:   Procedure Laterality Date    REALIGN PATELLA         ALLERGIES:     Allergies   Allergen Reactions    Cortisone     Penicillins     Statins Other (See Comments)     Side effect: myalgias. Ok with zocor only       FAMILY HISTORY:  Family History   Problem Relation Age of Onset    Seizure Disorder Mother     Glaucoma Other     Macular Degeneration No family hx of          SOCIAL HISTORY:  Social History     Tobacco Use    Smoking status: Never     Passive exposure: Never    Smokeless tobacco: Never    Tobacco comments:     NA   Vaping Use    Vaping status: Never Used   Substance Use Topics    Alcohol use: No    Drug use: No       ROS:   Constitutional: No fever, chills, or sweats. Weight stable.   Cardiovascular: As per HPI.       Exam:  /84 (BP Location: Left arm, Patient Position: Chair, Cuff Size: Adult Regular)   Pulse 88   Wt 85.7 kg (189 lb)   SpO2 98%   BMI 30.97 kg/m    GENERAL APPEARANCE: alert and no distress  HEENT: no icterus, no central  cyanosis  LYMPH/NECK: no adenopathy, no asymmetry, JVP not elevated, no carotid bruits.  RESPIRATORY: lungs clear to auscultation - no rales, rhonchi or wheezes, no use of accessory muscles, no retractions, respirations are unlabored, normal respiratory rate  CARDIOVASCULAR: regular rhythm, normal S1, S2, no S3 or S4 and no murmur, click or rub, precordium quiet with normal PMI.  GI: soft, non tender  EXTREMITIES: 1+ bilateral pretibial edema  NEURO: alert, normal speech,and affect  SKIN: no ecchymoses, no rashes     I have reviewed the labs and personally reviewed the imaging below and made my comment in the assessment and plan.    Labs:  CBC RESULTS:   Lab Results   Component Value Date    WBC 7.4 03/24/2021    RBC 5.54 03/24/2021    HGB 14.9 03/24/2021    HCT 45.1 03/24/2021    MCV 81 03/24/2021    MCH 26.9 03/24/2021    MCHC 33.0 03/24/2021    RDW 14.2 03/24/2021     03/24/2021       BMP RESULTS:  Lab Results   Component Value Date     04/04/2024     03/24/2021    POTASSIUM 4.1 04/04/2024    POTASSIUM 3.8 03/24/2021    CHLORIDE 102 04/04/2024    CHLORIDE 104 03/24/2021    CO2 22 04/04/2024    CO2 26 03/24/2021    ANIONGAP 14 04/04/2024    ANIONGAP 6 03/24/2021     (H) 04/04/2024     (H) 03/24/2021    BUN 27.0 (H) 04/04/2024    BUN 17 03/24/2021    CR 1.04 04/04/2024    CR 0.89 03/24/2021    GFRESTIMATED 87 04/04/2024    GFRESTIMATED >90 03/24/2021    GFRESTBLACK >90 03/24/2021    VIVEK 9.2 04/04/2024    VIVEK 8.2 (L) 03/24/2021      Transthoracic echocardiogram 7/2024: LVEF 60 to 65%, RV size and function is normal, no significant valve disease.    Echocardiogram 2021    Exercise was stopped due to fatigue.  There was a normal BP response to exercise.  The patient exhibited no chest pain during exercise.  The patient exercised 6:50.  A low to moderate workload was achieved.  Target Heart Rate was not achieved due to fatigue.  Suboptimal stress test due to inadequate maximum heart  rate.  A treadmill exercise test according to the Osman protocol was performed.  No arrhythmia noted.  The EKG portion of this stress test was negative for inducible ischemia (see  echo results below).  The Duke treadmill score was intermediate risk ( -11< Hinojosa score <5).  The visual ejection fraction is estimated at >70%.  Left ventricular cavity size decreases with exercise.  Global LV systolic function augments with exercise.  Normal resting wall motion and no stress-induced wall motion abnormality.     Baseline  The patient is in normal sinus rhythm.  Poor R wave progression across the precordial leads.  The visual ejection fraction is estimated at 60-65%.  No regional wall motion abnormalities noted.    Echocardiogram 11/2022 LVEF 45 to 50%    Coronary angiogram [facility] 11/18/2022  Summary/Conclusions   PRESENTATION / INDICATIONS   * STEMI     DIAGNOSTIC SUMMARY   ? Proximal LAD 90%   ? 100% stenosis in the Mid LAD   ? 100% stenosis in the 1st Diagonal   ? The Circumflex is free of significant disease.   ? The RCA has mild luminal irregularities.     INTERVENTION   ? 2.75mm x 38mm Balloon and  3mm x 20mm Balloon to Proximal LAD   ? Successful  2.5mm x 20mm Balloon,  2.5mm x 38mm Drug Eluting Stent,  2.5mm x 38mm Balloon, and  3mm x 20mm Balloon to Mid LAD   ? Successful  2mm x 12mm Balloon,  2mm x 8mm Drug Eluting Stent, and  2mm x 8mm Balloon to 1st Diagonal, post stenosis 0%     RECOMMENDATIONS & PLAN   * Plavix for 1 year     Coronary angiogram [facility] 8/16/2024:  Summary/Conclusions   PRESENTATION / INDICATIONS   * [place] - Highest CCS Class w/in past 2 wks  - Class III   VASCULAR ACCESS   * Using ultrasound guidance and a percutaneous technique, the right common femoral artery was accessed. Ultrasound was used to confirm vessel patency, localizing needle into the lumen of the vessel. An image was saved for the medical record.   DIAGNOSTIC - CORONARY   * Right dominant coronary artery system   *  Single vessel coronary disease   * The left main artery was normal in appearance and free of obstructive disease.   * 70% stenosis in the proximal LAD   * 75% stenosis in the mid 1st Diagonal   * The circumflex artery has mild disease.   * The RCA has mild disease.   * Restenosis in the proximal margin of the LAD stent.     INTERVENTION   * Pressure gradient measurements were measured across the lesion   * Successful stenting (drug eluting) of the proximal LAD     Assessment and Plan:     # History of STEMI in 2022 status post LAD/diagonal 1 stent (echocardiogram July 2024 showed normal LVEF)  # Unstable angina 8/16/2024 status post drug-eluting stent of the proximal LAD  -Patient asymptomatic.  -Continue current medications with metoprolol 25, lisinopril 5 mg, Crestor, aspirin and clopidogrel.   -Lifelong DAPT  -Recommend moderate physical activity half an hour per day.  He has sedentary lifestyle.    # Type 2 diabetes, uncontrolled complicated by neuropathy and proteinuria  -Follows with endocrinology.  On semaglutide, insulin, Jardiance 25 mg.  Most recent hemoglobin A1c 10.    # Leg pain, neuropathy  -Check DAMARIS.    No medication changes today.      Return to clinic 1 year or earlier as needed.    Total time spent today for this visit is 30 minutes including precharting, face-to-face clinic visit, review of labs/imaging and medical documentation.    Alicja NAVARRO MD  [facility] Division of Cardiology  Pager [phone]

## 2024-11-25 ENCOUNTER — OFFICE VISIT (OUTPATIENT)
Dept: CARDIOLOGY | Facility: CLINIC | Age: 49
End: 2024-11-25
Payer: COMMERCIAL

## 2024-11-25 VITALS
DIASTOLIC BLOOD PRESSURE: 84 MMHG | BODY MASS INDEX: 30.97 KG/M2 | WEIGHT: 189 LBS | OXYGEN SATURATION: 98 % | HEART RATE: 88 BPM | SYSTOLIC BLOOD PRESSURE: 125 MMHG

## 2024-11-25 DIAGNOSIS — M79.605 PAIN IN BOTH LOWER EXTREMITIES: ICD-10-CM

## 2024-11-25 DIAGNOSIS — Z95.5 HISTORY OF PLACEMENT OF STENT IN LAD CORONARY ARTERY: Primary | ICD-10-CM

## 2024-11-25 DIAGNOSIS — E11.9 TYPE 2 DIABETES, HBA1C GOAL < 7% (H): ICD-10-CM

## 2024-11-25 DIAGNOSIS — I25.10 CORONARY ARTERY DISEASE INVOLVING NATIVE CORONARY ARTERY OF NATIVE HEART WITHOUT ANGINA PECTORIS: ICD-10-CM

## 2024-11-25 DIAGNOSIS — M79.604 PAIN IN BOTH LOWER EXTREMITIES: ICD-10-CM

## 2024-11-25 NOTE — NURSING NOTE
"Chief Complaint   Patient presents with    Follow Up     \"Post-hospital F/U for mild heart attack\" was seen at [facility] 8/14       Initial BP (!) 143/90 (BP Location: Left arm, Patient Position: Chair, Cuff Size: Adult Regular)   Pulse 88   Wt 85.7 kg (189 lb)   SpO2 98%   BMI 30.97 kg/m   Estimated body mass index is 30.97 kg/m  as calculated from the following:    Height as of 6/19/24: 1.664 m (5' 5.5\").    Weight as of this encounter: 85.7 kg (189 lb)..  BP completed using cuff size: regular    Robyn Fountain, Visit Facilitator    "

## 2024-11-25 NOTE — LETTER
11/25/2024      RE: Israel Brown  [address]  [place] [address]       Dear Colleague,    Thank you for the opportunity to participate in the care of your patient, Israel Brown, at the [facility]. Please see a copy of my visit note below.                                              [facility]      Referring provider:Lloyd Dunn APRN CNP       HPI: Mr. Israel Brown is a 48 year old  male with PMH significant for  -GERD   -Obesity  -Type 2 diabetes with history of peripheral neuropathy and retinopathy, not well-controlled most recent hemoglobin A1c 12.3  -Hypertension  -STEMI status post drug-eluting stent to mid LAD/diagonal 1, 11/18/2022  -LVEF 45 to 50% by echocardiogram 2022    Patient is being seen today for history of coronary artery disease.  Patient presented to outside hospital 11/18/2022 with chest pain and EKG was consistent with STEMI, underwent coronary angiogram with drug-eluting stent to mid LAD and diagonal 1.  Echo showed LVEF 45 to 50%.  He followed up with cardiology clinic at [facility] until 2023.    He is establishing care with us.  He has no cardiac symptoms at this time.  He works as a .  He sleeps during the day and works at night.  Denies chest pain, shortness of breath, dizziness, palpitations.  He tells me he has some swelling around the ankles.  Lifetime non-smoker.  No alcohol abuse.    Patient currently on aspirin, clopidogrel, Lasix 20 mg?, lisinopril 5 mg, metoprolol 25 mg, Crestor 20 mg    LDL well-controlled.    Medications, personal, family, and social history reviewed with patient and revised.    Interval history 11/25/2024:  Patient presented to [facility] 8/16/2024 with unstable angina for 2 weeks.  High sensitive troponin was mildly elevated at 20.  Underwent coronary angiogram which showed restenosis in the  proximal LAD, 70%.  Underwent drug-eluting stent of the proximal LAD.  Mid LAD stent and diagonal stent were patent.  Discharge medications: Zetia 10 mg, Crestor 40 mg, aspirin, Lasix 20 mg, Jardiance 25 mg, lisinopril 5 mg, metoprolol 25 mg, insulin, Plavix 75 mg.  Patient did not undergo echocardiogram during this hospitalization.  Patient is feeling well.  No recurrent chest discomfort since then.  No chest pain, shortness of breath, syncope.  Sometimes feels leg pain.  He tells me he has neuropathy.  He has mild lower extremity edema.  He is on Lasix.  He has proteinuria.    He works as a security with night shifts.  PAST MEDICAL HISTORY:  Past Medical History:   Diagnosis Date     Asthma      Diabetes mellitus (H)      Neuropathy      RA (rheumatoid arthritis) (H)        CURRENT MEDICATIONS:  Current Outpatient Medications   Medication Sig Dispense Refill     ASPIRIN LOW DOSE 81 MG chewable tablet CHEW AND SWALLOW ONE TABLET BY MOUTH EVERY DAY       clindamycin (CLEOCIN) 75 MG/5ML solution        clopidogrel (PLAVIX) 75 MG tablet TAKE ONE TABLET BY MOUTH EVERY DAY 60 tablet 0     Continuous Blood Gluc  (DEXCOM G7 ) GUY Use to read blood sugars as per 's instructions. 1 each 0     Continuous Glucose Sensor (DEXCOM G7 SENSOR) MISC Change every 10 days. 3 each 5     empagliflozin (JARDIANCE) 25 MG TABS tablet Take 25 mg by mouth daily       fluticasone (FLOVENT HFA) 220 MCG/ACT inhaler Inhale 1 puff into the lungs 2 times daily 30 g 0     furosemide (LASIX) 20 MG tablet Take 20 mg by mouth daily       gabapentin (NEURONTIN) 100 MG capsule TAKE ONE CAPSULE BY MOUTH EVERY DAY AT BEDTIME FOR 3 DAYS, 2 CAPS FOR 3 DAYS, THEN 3 CAPS AT BEDTIME 90 capsule 0     insulin aspart (NOVOLOG FLEXPEN) 100 UNIT/ML injection Inject Subcutaneous 3 times daily (with meals) (Patient not taking: Reported on 8/30/2024)       Insulin Disposable Pump (OMNIPOD 5 G7 INTRO, GEN 5,) KIT 1 pod every 3 days. (Or  when 200 units of insulin has been used). See  administration instructions. 1 kit 0     Insulin Disposable Pump (OMNIPOD 5 G7 PODS, GEN 5,) MISC 1 pod every 3 days. (Or when 200 units of insulin has been used).  See  administration instructions. 10 each 5     levothyroxine (SYNTHROID/LEVOTHROID) 50 MCG tablet Take 50 mcg by mouth       lisinopril (PRINIVIL/ZESTRIL) 5 MG tablet Take 5 mg by mouth       methocarbamol (ROBAXIN) 500 MG tablet Take 500 mg by mouth       metoprolol succinate ER (TOPROL XL) 25 MG 24 hr tablet Take 25 mg by mouth daily       nitroGLYcerin (NITROSTAT) 0.4 MG sublingual tablet Place 0.4 mg under the tongue       omeprazole (PRILOSEC) 20 MG DR capsule Take 2 capsules (40 mg) by mouth daily 30 capsule 3     ondansetron (ZOFRAN ODT) 4 MG ODT tab Take 1 tablet (4 mg) by mouth every 8 hours as needed for nausea 10 tablet 0     Pregabalin (LYRICA PO)        rosuvastatin (CRESTOR) 20 MG tablet Take 20 mg by mouth at bedtime       rosuvastatin (CRESTOR) 40 MG tablet Take 40 mg by mouth daily       semaglutide (OZEMPIC) 2 MG/3ML pen Inject 0.5 mg subcutaneously every 7 days. (Patient taking differently: Inject 0.25 mg subcutaneously every 7 days.) 3 mL 3     SEMGLEE, YFGN, 100 UNIT/ML SOPN Inject 60 Units subcutaneously daily.         PAST SURGICAL HISTORY:  Past Surgical History:   Procedure Laterality Date     REALIGN PATELLA         ALLERGIES:     Allergies   Allergen Reactions     Cortisone      Penicillins      Statins Other (See Comments)     Side effect: myalgias. Ok with zocor only       FAMILY HISTORY:  Family History   Problem Relation Age of Onset     Seizure Disorder Mother      Glaucoma Other      Macular Degeneration No family hx of          SOCIAL HISTORY:  Social History     Tobacco Use     Smoking status: Never     Passive exposure: Never     Smokeless tobacco: Never     Tobacco comments:     NA   Vaping Use     Vaping status: Never Used   Substance Use Topics      Alcohol use: No     Drug use: No       ROS:   Constitutional: No fever, chills, or sweats. Weight stable.   Cardiovascular: As per HPI.       Exam:  /84 (BP Location: Left arm, Patient Position: Chair, Cuff Size: Adult Regular)   Pulse 88   Wt 85.7 kg (189 lb)   SpO2 98%   BMI 30.97 kg/m    GENERAL APPEARANCE: alert and no distress  HEENT: no icterus, no central cyanosis  LYMPH/NECK: no adenopathy, no asymmetry, JVP not elevated, no carotid bruits.  RESPIRATORY: lungs clear to auscultation - no rales, rhonchi or wheezes, no use of accessory muscles, no retractions, respirations are unlabored, normal respiratory rate  CARDIOVASCULAR: regular rhythm, normal S1, S2, no S3 or S4 and no murmur, click or rub, precordium quiet with normal PMI.  GI: soft, non tender  EXTREMITIES: 1+ bilateral pretibial edema  NEURO: alert, normal speech,and affect  SKIN: no ecchymoses, no rashes     I have reviewed the labs and personally reviewed the imaging below and made my comment in the assessment and plan.    Labs:  CBC RESULTS:   Lab Results   Component Value Date    WBC 7.4 03/24/2021    RBC 5.54 03/24/2021    HGB 14.9 03/24/2021    HCT 45.1 03/24/2021    MCV 81 03/24/2021    MCH 26.9 03/24/2021    MCHC 33.0 03/24/2021    RDW 14.2 03/24/2021     03/24/2021       BMP RESULTS:  Lab Results   Component Value Date     04/04/2024     03/24/2021    POTASSIUM 4.1 04/04/2024    POTASSIUM 3.8 03/24/2021    CHLORIDE 102 04/04/2024    CHLORIDE 104 03/24/2021    CO2 22 04/04/2024    CO2 26 03/24/2021    ANIONGAP 14 04/04/2024    ANIONGAP 6 03/24/2021     (H) 04/04/2024     (H) 03/24/2021    BUN 27.0 (H) 04/04/2024    BUN 17 03/24/2021    CR 1.04 04/04/2024    CR 0.89 03/24/2021    GFRESTIMATED 87 04/04/2024    GFRESTIMATED >90 03/24/2021    GFRESTBLACK >90 03/24/2021    VIVEK 9.2 04/04/2024    VIVEK 8.2 (L) 03/24/2021      Transthoracic echocardiogram 7/2024: LVEF 60 to 65%, RV size and function is  normal, no significant valve disease.    Echocardiogram 2021    Exercise was stopped due to fatigue.  There was a normal BP response to exercise.  The patient exhibited no chest pain during exercise.  The patient exercised 6:50.  A low to moderate workload was achieved.  Target Heart Rate was not achieved due to fatigue.  Suboptimal stress test due to inadequate maximum heart rate.  A treadmill exercise test according to the Osman protocol was performed.  No arrhythmia noted.  The EKG portion of this stress test was negative for inducible ischemia (see  echo results below).  The Duke treadmill score was intermediate risk ( -11< Hinojosa score <5).  The visual ejection fraction is estimated at >70%.  Left ventricular cavity size decreases with exercise.  Global LV systolic function augments with exercise.  Normal resting wall motion and no stress-induced wall motion abnormality.     Baseline  The patient is in normal sinus rhythm.  Poor R wave progression across the precordial leads.  The visual ejection fraction is estimated at 60-65%.  No regional wall motion abnormalities noted.    Echocardiogram 11/2022 LVEF 45 to 50%    Coronary angiogram [facility] 11/18/2022  Summary/Conclusions   PRESENTATION / INDICATIONS   * STEMI     DIAGNOSTIC SUMMARY   ? Proximal LAD 90%   ? 100% stenosis in the Mid LAD   ? 100% stenosis in the 1st Diagonal   ? The Circumflex is free of significant disease.   ? The RCA has mild luminal irregularities.     INTERVENTION   ? 2.75mm x 38mm Balloon and  3mm x 20mm Balloon to Proximal LAD   ? Successful  2.5mm x 20mm Balloon,  2.5mm x 38mm Drug Eluting Stent,  2.5mm x 38mm Balloon, and  3mm x 20mm Balloon to Mid LAD   ? Successful  2mm x 12mm Balloon,  2mm x 8mm Drug Eluting Stent, and  2mm x 8mm Balloon to 1st Diagonal, post stenosis 0%     RECOMMENDATIONS & PLAN   * Plavix for 1 year     Coronary angiogram [facility] 8/16/2024:  Summary/Conclusions   PRESENTATION / INDICATIONS   * [place] -  Highest CCS Class w/in past 2 wks  - Class III   VASCULAR ACCESS   * Using ultrasound guidance and a percutaneous technique, the right common femoral artery was accessed. Ultrasound was used to confirm vessel patency, localizing needle into the lumen of the vessel. An image was saved for the medical record.   DIAGNOSTIC - CORONARY   * Right dominant coronary artery system   * Single vessel coronary disease   * The left main artery was normal in appearance and free of obstructive disease.   * 70% stenosis in the proximal LAD   * 75% stenosis in the mid 1st Diagonal   * The circumflex artery has mild disease.   * The RCA has mild disease.   * Restenosis in the proximal margin of the LAD stent.     INTERVENTION   * Pressure gradient measurements were measured across the lesion   * Successful stenting (drug eluting) of the proximal LAD     Assessment and Plan:     # History of STEMI in 2022 status post LAD/diagonal 1 stent (echocardiogram July 2024 showed normal LVEF)  # Unstable angina 8/16/2024 status post drug-eluting stent of the proximal LAD  -Patient asymptomatic.  -Continue current medications with metoprolol 25, lisinopril 5 mg, Crestor, aspirin and clopidogrel.   -Lifelong DAPT  -Recommend moderate physical activity half an hour per day.  He has sedentary lifestyle.    # Type 2 diabetes, uncontrolled complicated by neuropathy and proteinuria  -Follows with endocrinology.  On semaglutide, insulin, Jardiance 25 mg.  Most recent hemoglobin A1c 10.    # Leg pain, neuropathy  -Check DAMARIS.    No medication changes today.      Return to clinic 1 year or earlier as needed.    Total time spent today for this visit is 30 minutes including precharting, face-to-face clinic visit, review of labs/imaging and medical documentation.    Alicja NAVARRO MD  [facility] Division of Cardiology  Pager [phone]       Please do not hesitate to contact me if you have any questions/concerns.     Sincerely,     Alicja Navarro MD

## 2024-11-25 NOTE — PATIENT INSTRUCTIONS
Thank you for coming to the [facility] @ [facility]; please note the following instructions:    1. Recheck blood pressure today    2. [facility] Ultrasound - to schedule this please call # [phone]    3. Recommendation to do 30 minute walks    4. Follow up with Dr. Kwan in 1 year        If you have any questions regarding your visit please contact your care team:     Cardiology  Telephone Number   Sindy BRICENO., RN  Orquidea JACINTO, RN  Trinidad WATSON, RN  Naya ANGUIANO, RMNILSON CERNA, SHIVA HONEYCUTT, Clinic Facilitator  Robyn JACINTO, Clinic Facilitator [phone] (option 1)   For scheduling appts:     [phone] (select option 1)       For the Device Clinic (Pacemakers and ICD's)  RN's :  Karo Allen   During business hours: [phone]    *After business hours:  [phone] (select option 4)      Normal test result notifications will be released via [employer] or mailed within 7 business days.  All other test results, will be communicated via telephone once reviewed by your cardiologist.    If you need a medication refill please contact your pharmacy.  Please allow 3 business days for your refill to be completed.    As always, thank you for trusting us with your health care needs!

## 2025-03-08 ENCOUNTER — HEALTH MAINTENANCE LETTER (OUTPATIENT)
Age: 50
End: 2025-03-08

## 2025-04-02 NOTE — PROGRESS NOTES
Outcome for 04/02/25 12:44 PM: Device upload instructions sent to patient via [employer] - Mafengwomilo Hess CMA  Adult Endocrinology  [facility], [place]

## 2025-04-03 ENCOUNTER — OFFICE VISIT (OUTPATIENT)
Dept: ENDOCRINOLOGY | Facility: CLINIC | Age: 50
End: 2025-04-03
Payer: MEDICAID

## 2025-04-03 VITALS
OXYGEN SATURATION: 96 % | BODY MASS INDEX: 29.66 KG/M2 | WEIGHT: 181 LBS | RESPIRATION RATE: 16 BRPM | SYSTOLIC BLOOD PRESSURE: 107 MMHG | HEART RATE: 78 BPM | DIASTOLIC BLOOD PRESSURE: 74 MMHG

## 2025-04-03 DIAGNOSIS — Z79.4 TYPE 2 DIABETES MELLITUS WITH DIABETIC NEUROPATHY, WITH LONG-TERM CURRENT USE OF INSULIN (H): ICD-10-CM

## 2025-04-03 DIAGNOSIS — E11.40 TYPE 2 DIABETES MELLITUS WITH DIABETIC NEUROPATHY, WITH LONG-TERM CURRENT USE OF INSULIN (H): ICD-10-CM

## 2025-04-03 LAB
ANION GAP SERPL CALCULATED.3IONS-SCNC: 11 MMOL/L (ref 7–15)
BUN SERPL-MCNC: 17.7 MG/DL (ref 6–20)
CALCIUM SERPL-MCNC: 9.4 MG/DL (ref 8.8–10.4)
CHLORIDE SERPL-SCNC: 102 MMOL/L (ref 98–107)
CREAT SERPL-MCNC: 0.93 MG/DL (ref 0.67–1.17)
CREAT UR-MCNC: 29.4 MG/DL
EGFRCR SERPLBLD CKD-EPI 2021: >90 ML/MIN/1.73M2
EST. AVERAGE GLUCOSE BLD GHB EST-MCNC: >384 MG/DL
GLUCOSE SERPL-MCNC: 230 MG/DL (ref 70–99)
HBA1C MFR BLD: >15 %
HCO3 SERPL-SCNC: 25 MMOL/L (ref 22–29)
MICROALBUMIN UR-MCNC: 29.2 MG/L
MICROALBUMIN/CREAT UR: 99.32 MG/G CR (ref 0–17)
POTASSIUM SERPL-SCNC: 4.2 MMOL/L (ref 3.4–5.3)
SODIUM SERPL-SCNC: 138 MMOL/L (ref 135–145)
TSH SERPL DL<=0.005 MIU/L-ACNC: 3.51 UIU/ML (ref 0.3–4.2)

## 2025-04-03 PROCEDURE — 83036 HEMOGLOBIN GLYCOSYLATED A1C: CPT | Performed by: PHYSICIAN ASSISTANT

## 2025-04-03 PROCEDURE — 82043 UR ALBUMIN QUANTITATIVE: CPT | Performed by: PHYSICIAN ASSISTANT

## 2025-04-03 PROCEDURE — 84443 ASSAY THYROID STIM HORMONE: CPT | Performed by: PHYSICIAN ASSISTANT

## 2025-04-03 PROCEDURE — 99214 OFFICE O/P EST MOD 30 MIN: CPT | Performed by: PHYSICIAN ASSISTANT

## 2025-04-03 PROCEDURE — 82570 ASSAY OF URINE CREATININE: CPT | Performed by: PHYSICIAN ASSISTANT

## 2025-04-03 PROCEDURE — 3078F DIAST BP <80 MM HG: CPT | Performed by: PHYSICIAN ASSISTANT

## 2025-04-03 PROCEDURE — 80048 BASIC METABOLIC PNL TOTAL CA: CPT | Performed by: PHYSICIAN ASSISTANT

## 2025-04-03 PROCEDURE — 3074F SYST BP LT 130 MM HG: CPT | Performed by: PHYSICIAN ASSISTANT

## 2025-04-03 PROCEDURE — 36415 COLL VENOUS BLD VENIPUNCTURE: CPT | Performed by: PHYSICIAN ASSISTANT

## 2025-04-03 RX ORDER — ACYCLOVIR 400 MG/1
TABLET ORAL
Qty: 3 EACH | Refills: 5 | Status: SHIPPED | OUTPATIENT
Start: 2025-04-03

## 2025-04-03 RX ORDER — INSULIN PMP CART,AUT,G6/7,CNTR
1 EACH SUBCUTANEOUS
Qty: 1 KIT | Refills: 0 | Status: SHIPPED | OUTPATIENT
Start: 2025-04-03

## 2025-04-03 NOTE — PATIENT INSTRUCTIONS
Welcome to the [facility] Endocrinology and Diabetes Clinics     Our Endocrinology Clinics are here to provide you with a team-based, collaborative approach in the diagnosis and treatment of patients with diabetes and endocrine disorders. The team is made up of Physicians, Physician Assistants, Certified Diabetes Educators, Registered Nurses, Medical Assistants, Emergency Medical Technicians, and many others, all of whom have the unified goal of providing our patients with high quality care.     Please see below for some helpful tips to best navigate and use the [facility] Endocrinology clinic:     [place] Respect: At [facility], we are committed to a respectful and safe space for all patients, visitors, and staff.  We believe that mutual respect between patients and their care team is the foundation of quality care.  It is our expectation that you will be treated with respect by your care team.  In turn, we ask that all communication with the care team (written and verbal) be respectful and free from profanity, threatening, or abusive language.  Disrespectful communication undermines our therapeutic relationship with you and may result in us being unable to continue to provide your care.    Refills: A provider must see you at least annually to prescribe and refill medications. This is to ensure your safety as well as meet insurance and compliance regulations.    Scheduling: Many of our Providers offer both in-person or video visits. Please call to schedule any needed follow ups as soon as possible because our provider schedules fill up very quickly. Our care team has the right to require an in-person visit when they believe that it is medically necessary. Please remember that for any virtual visits, you must be in the [facility] at the time of the visit, otherwise we are unable to see you and you will need to be rescheduled.    Missed Appointments: If you need to cancel or miss your  scheduled appointment, please call the clinic at [phone] to reschedule.  Please note if you repeatedly miss appointments or repeatedly miss appointments without calling to inform us ahead of time (no-show), the clinic may elect to not allow you to reschedule without speaking to a manager, may require a Partnership In Care Agreement prior to rescheduling, or could result in you no longer being able to receive care from the clinic. Providing the clinic with timely notification if you have to miss an appointment, allows us to better serve the needs of all of our patients.    Primary Care Provider: Our Endocrinologists are Specialists in their field. We expect you to have a Primary Care Provider established to handle any needs outside of your diabetes and endocrine care.  We would be happy to assist you find a Primary Care Provider, if you do not have one.    Picurio: [employer] is a wonderful resource that allows you access to your Care Team via online or the nicola. Please ask a member of the team if you would like help creating an account. Please note that it may take up to 2 business days for a response. [employer]io messages are not reviewed on weekends or after business hours.  Emergent or urgent care needs should never be communicated via Pic[employer]io.  If you experience a medical emergency call 911 or go to the nearest emergency room.    Labs: It is recommended that you stay within the [facility] for labs but you are welcome to obtain ordered labs (with some exceptions) from any location of your choice as long as they are able to complete and process the needed labs. If you need us to fax orders to your preferred lab, please provide us the name and fax number of the lab you would like to go to so we can fax the orders. If your labs are drawn outside of the [facility], please have them fax the results to [phone] ([place]) or [phone] ([place]) or via [facility]. It is your  responsibility to ensure that outside lab results are sent to us.    We look forward to working with you. Please do not hesitate to reach out with any questions.    Thank you,    The Endocrine Team    [facility] Address:   [place] Address:     [place] [address]    Phone: [phone]  Fax: [phone]   [address] [place] [address]    Phone: [phone]  Fax: [phone]     [facility] Cost Estimate Phone Number: [phone]    General Lab and Imaging Scheduling Phone Number: [phone]      If you are interested in exploring research opportunities in the Division of Diabetes, Endocrinology and Metabolism and within the [facility] you are welcome to view the following QR codes by scanning them using your phone's camera to access a link to more information.  Participating in a research study is voluntary. Your decision whether or not to participate will not affect your current or future relations with the [facility] or [facility]. Current available studies are:     Type 1 Diabetes  Adults     Pediatrics     Type 2 Diabetes  Weight Loss - People Treated with Diet or Metformin Only     Pediatrics and Young Adults

## 2025-04-03 NOTE — PROGRESS NOTES
Assessment/Plan :   Type 2 DM. Israel's blood sugars are very elevated. We discussed the risk of DKA and the impact on hyperglycemia on the body. We need to get his blood sugars back down. He understands he would like to get his blood sugars under better control. We will resend in a new prescription for the Dexcom G7 sensor. He needs to start monitoring his blood sugars. I will also place another prescription for the Omnipod 5 intro kit along with a referral to our CDE team. He will continue with 0.25 mg of Ozempic for another 3 wks and then we will plan on increasing to 0.5 mg weekly. He is to remain on 60 unit(s) of Semglee daily, until he transitions to the Omnipod. If he has any trouble picking up the prescriptions, he will contact our office. We will follow-up in 3 mos but he will meet with our CDE team within the month.       I have independently reviewed and interpreted labs, imaging as indicated.      Chief complaint:  Israel is a 49 year old male who returns for follow-up of Type 2 DM.    I have reviewed Care Everywhere including [facility], [facility], [facility], [facility], [facility], [facility] , [facility], [place] lab reports, imaging reports and provider notes as indicated.      HISTORY OF PRESENT ILLNESS  Israel expected his hemoglobin A1C to be elevated. He never started the Omnipod insulin pump. He was told that the pharmacy had sent him the intro kit but he never received anything. Then on January 1st, he lost his insurance. He has been off of all medications up until a week ago. He also has not been monitoring his blood sugars. He doesn't feel good, so he assumed that his blood sugars have been elevated.     Israel assumes that his blood sugars have been very elevated. He has been tired and there are periods of time when his vision will be blurry. He does have a history of retinopathy and he is worried that this may be worsening. He has not had any signs or symptoms of DKA or severe  hypoglycemia. He did restart Ozempic 0.25 mg weekly and Semglee 60 unit(s) daily, last week. He has not been taking the Novolog with meals. As previously stated, he doesn't feel good. He has also been struggling with balance. He will just fall over at times. He is not sure if this is due to his blood sugars or something else.     Israel was diagnosed with diabetes in 2004. He was started on metformin at the time of diagnosis but the medication didn't agree with him. He states that he just never felt good when taking the medication. He was then started on insulin. He feels like insulin works well, if he can remember to take it. He currently works nights and he will often forget to take insulin when he gets home in the morning. He has also never been able to take meal time insulin, consistently. If he remembers, he takes Lantus 60 unit(s) daily along with Jardiance 10 mg daily. Ozempic was started in April of 2024, to try and help with meal time spikes. His diabetes is complicated by CAD with a history of a non-STEMI, hypothyroidism, hyperlipidemia, HTN, GERD, KEKE, and ischemic cardiomyopathy. In August of 2024 he was admitted with heart attack like symptoms.     Endocrine relevant labs are as follows:   Latest Reference Range & Units 04/03/25 14:50   Afinion Hemoglobin A1c POCT <=5.7 % >15.0 (H)   (H): Data is abnormally high   Latest Reference Range & Units 04/03/25 15:38   Albumin Urine mg/g Cr 0.00 - 17.00 mg/g Cr 99.32 (H)   (H): Data is abnormally high   Latest Reference Range & Units 04/03/25 15:38   Albumin Urine mg/L mg/L 29.2      Latest Reference Range & Units 08/22/24 12:33   Afinion Hemoglobin A1c POCT <=5.7 % 10.5 (H)   (H): Data is abnormally high   Latest Reference Range & Units 04/04/24 07:57   Hemoglobin A1C 0.0 - 5.6 % 12.3 (H)   (H): Data is abnormally high   Latest Reference Range & Units 04/04/24 07:57   TSH 0.30 - 4.20 uIU/mL 5.18 (H)   (H): Data is abnormally high   Latest Reference Range & Units  04/04/24 07:57   Albumin Urine mg/g Cr 0.00 - 17.00 mg/g Cr 144.02 (H)   (H): Data is abnormally high   Latest Reference Range & Units 04/04/24 07:57   Albumin Urine mg/L mg/L 106.0     REVIEW OF SYSTEMS    Endocrine: positive for thyroid disorder and diabetes  Skin: positive for itching, bruising, lumps or bumps  Eyes: positive for visual blurring, negative for, redness, tearing  Ears/Nose/Throat: negative  Respiratory: No shortness of breath, dyspnea on exertion, cough, or hemoptysis  Cardiovascular: negative for, chest pain, dyspnea on exertion, lower extremity edema, and exercise intolerance  Gastrointestinal: negative for, nausea, vomiting, constipation, and diarrhea  Genitourinary: positive for frequency and urgency, negative for, nocturia, and dysuria  Musculoskeletal: positive for muscular weakness, negative for, nocturnal cramping, and foot pain  Neurologic: positive for local weakness, numbness or tingling of feet, and incoordination, negative for, and numbness or tingling of hands  Psychiatric: negative  Hematologic/Lymphatic/Immunologic: negative    Past Medical History  Past Medical History:   Diagnosis Date    Asthma     Diabetes mellitus (H)     Neuropathy     RA (rheumatoid arthritis) (H)        Medications  Current Outpatient Medications   Medication Sig Dispense Refill    ASPIRIN LOW DOSE 81 MG chewable tablet CHEW AND SWALLOW ONE TABLET BY MOUTH EVERY DAY      clindamycin (CLEOCIN) 75 MG/5ML solution       clopidogrel (PLAVIX) 75 MG tablet TAKE ONE TABLET BY MOUTH EVERY DAY 60 tablet 0    Continuous Blood Gluc  (DEXCOM G7 ) GUY Use to read blood sugars as per 's instructions. 1 each 0    Continuous Glucose Sensor (DEXCOM G7 SENSOR) MISC Change every 10 days. 3 each 5    empagliflozin (JARDIANCE) 25 MG TABS tablet Take 25 mg by mouth daily      fluticasone (FLOVENT HFA) 220 MCG/ACT inhaler Inhale 1 puff into the lungs 2 times daily 30 g 0    furosemide (LASIX) 20 MG  tablet Take 20 mg by mouth daily      gabapentin (NEURONTIN) 100 MG capsule TAKE ONE CAPSULE BY MOUTH EVERY DAY AT BEDTIME FOR 3 DAYS, 2 CAPS FOR 3 DAYS, THEN 3 CAPS AT BEDTIME 90 capsule 0    insulin aspart (NOVOLOG FLEXPEN) 100 UNIT/ML injection Inject subcutaneously 3 times daily (with meals).      Insulin Disposable Pump (OMNIPOD 5 G7 INTRO, GEN 5,) KIT 1 pod every 3 days. (Or when 200 units of insulin has been used). See  administration instructions. 1 kit 0    Insulin Disposable Pump (OMNIPOD 5 G7 PODS, GEN 5,) MISC 1 pod every 3 days. (Or when 200 units of insulin has been used).  See  administration instructions. 10 each 5    levothyroxine (SYNTHROID/LEVOTHROID) 50 MCG tablet Take 50 mcg by mouth      lisinopril (PRINIVIL/ZESTRIL) 5 MG tablet Take 5 mg by mouth      methocarbamol (ROBAXIN) 500 MG tablet Take 500 mg by mouth      metoprolol succinate ER (TOPROL XL) 25 MG 24 hr tablet Take 25 mg by mouth daily      nitroGLYcerin (NITROSTAT) 0.4 MG sublingual tablet Place 0.4 mg under the tongue      omeprazole (PRILOSEC) 20 MG DR capsule Take 2 capsules (40 mg) by mouth daily 30 capsule 3    ondansetron (ZOFRAN ODT) 4 MG ODT tab Take 1 tablet (4 mg) by mouth every 8 hours as needed for nausea 10 tablet 0    Pregabalin (LYRICA PO)       rosuvastatin (CRESTOR) 20 MG tablet Take 20 mg by mouth at bedtime      rosuvastatin (CRESTOR) 40 MG tablet Take 40 mg by mouth daily      semaglutide (OZEMPIC) 2 MG/3ML pen Inject 0.5 mg subcutaneously every 7 days. (Patient taking differently: Inject 0.25 mg subcutaneously every 7 days.) 3 mL 3    SEMGLEE, YFGN, 100 UNIT/ML SOPN Inject 60 Units subcutaneously daily.         Allergies  Allergies   Allergen Reactions    Cortisone     Penicillins     Statins Other (See Comments)     Side effect: myalgias. Ok with zocor only         Family History  family history includes Glaucoma in an other family member; Seizure Disorder in his mother.    Social  History  Social History     Tobacco Use    Smoking status: Never     Passive exposure: Never    Smokeless tobacco: Never    Tobacco comments:     NA   Vaping Use    Vaping status: Never Used   Substance Use Topics    Alcohol use: No    Drug use: No       Physical Exam  /74   Pulse 78   Resp 16   Wt 82.1 kg (181 lb)   SpO2 96%   BMI 29.66 kg/m    Body mass index is 29.66 kg/m .  GENERAL :  In no apparent distress. He is accompanied by his friend who also helps with his medications.  SKIN: Normal color, normal temperature, texture.  No hirsutism, alopecia or purple striae.     EYES: PERRL, EOMI, No scleral icterus,  No proptosis, conjunctival redness, stare, retraction  RESP: Lungs clear to auscultation bilaterally  CARDIAC: Regular rate and rhythm, normal S1 S2, without murmurs, rubs or gallops    NEURO: awake, alert, responds appropriately to questions.  Cranial nerves intact.   Moves all extremities; Gait normal.  No tremor of the outstretched hand.    EXTREMITIES: No clubbing, cyanosis or edema.    DATA REVIEW  He has not been monitoring his blood sugars

## 2025-04-03 NOTE — NURSING NOTE
Israel Brown's goals for this visit include:   Chief Complaint   Patient presents with    Follow Up    Diabetes       He requests these members of his care team be copied on today's visit information: yes      PCP: Lloyd Dunn    Referring Provider:  Referred Self, MD  No address on file    /74   Pulse 78   Resp 16   Wt 82.1 kg (181 lb)   SpO2 96%   BMI 29.66 kg/m      Do you need any medication refills at today's visit? Yes    Dimitri Mckee, EMT

## 2025-04-03 NOTE — LETTER
4/3/2025      Israel Brown  [address]  [facility] [place] [address]      Dear Colleague,    Thank you for referring your patient, Israel Brown, to the [facility]. Please see a copy of my visit note below.    Outcome for 04/02/25 12:44 PM: Device upload instructions sent to patient via [employer] - dexcom   Naya Hess [facility]  Adult Endocrinology  [facility], [place]      Assessment/Plan :   Type 2 DM. Israel's blood sugars are very elevated. We discussed the risk of DKA and the impact on hyperglycemia on the body. We need to get his blood sugars back down. He understands he would like to get his blood sugars under better control. We will resend in a new prescription for the Dexcom G7 sensor. He needs to start monitoring his blood sugars. I will also place another prescription for the Omnipod 5 intro kit along with a referral to our CDE team. He will continue with 0.25 mg of Ozempic for another 3 wks and then we will plan on increasing to 0.5 mg weekly. He is to remain on 60 unit(s) of Semglee daily, until he transitions to the Omnipod. If he has any trouble picking up the prescriptions, he will contact our office. We will follow-up in 3 mos but he will meet with our CDE team within the month.       I have independently reviewed and interpreted labs, imaging as indicated.      Chief complaint:  Israel is a 49 year old male who returns for follow-up of Type 2 DM.    I have reviewed Care Everywhere including [facility], [facility], [facility], [facility], [facility] , [facility], [place] lab reports, imaging reports and provider notes as indicated.      HISTORY OF PRESENT ILLNESS  Israel expected his hemoglobin A1C to be elevated. He never started the Omnipod insulin pump. He was told that the pharmacy had sent him the intro kit but he never received anything. Then on January 1st, he lost his insurance. He has been off of all medications up until a week ago. He  also has not been monitoring his blood sugars. He doesn't feel good, so he assumed that his blood sugars have been elevated.     Israel assumes that his blood sugars have been very elevated. He has been tired and there are periods of time when his vision will be blurry. He does have a history of retinopathy and he is worried that this may be worsening. He has not had any signs or symptoms of DKA or severe hypoglycemia. He did restart Ozempic 0.25 mg weekly and Semglee 60 unit(s) daily, last week. He has not been taking the Novolog with meals. As previously stated, he doesn't feel good. He has also been struggling with balance. He will just fall over at times. He is not sure if this is due to his blood sugars or something else.     Israel was diagnosed with diabetes in 2004. He was started on metformin at the time of diagnosis but the medication didn't agree with him. He states that he just never felt good when taking the medication. He was then started on insulin. He feels like insulin works well, if he can remember to take it. He currently works nights and he will often forget to take insulin when he gets home in the morning. He has also never been able to take meal time insulin, consistently. If he remembers, he takes Lantus 60 unit(s) daily along with Jardiance 10 mg daily. Ozempic was started in April of 2024, to try and help with meal time spikes. His diabetes is complicated by CAD with a history of a non-STEMI, hypothyroidism, hyperlipidemia, HTN, GERD, KEKE, and ischemic cardiomyopathy. In August of 2024 he was admitted with heart attack like symptoms.     Endocrine relevant labs are as follows:   Latest Reference Range & Units 04/03/25 14:50   Afinion Hemoglobin A1c POCT <=5.7 % >15.0 (H)   (H): Data is abnormally high   Latest Reference Range & Units 04/03/25 15:38   Albumin Urine mg/g Cr 0.00 - 17.00 mg/g Cr 99.32 (H)   (H): Data is abnormally high   Latest Reference Range & Units 04/03/25 15:38   Albumin Urine  mg/L mg/L 29.2      Latest Reference Range & Units 08/22/24 12:33   Afinion Hemoglobin A1c POCT <=5.7 % 10.5 (H)   (H): Data is abnormally high   Latest Reference Range & Units 04/04/24 07:57   Hemoglobin A1C 0.0 - 5.6 % 12.3 (H)   (H): Data is abnormally high   Latest Reference Range & Units 04/04/24 07:57   TSH 0.30 - 4.20 uIU/mL 5.18 (H)   (H): Data is abnormally high   Latest Reference Range & Units 04/04/24 07:57   Albumin Urine mg/g Cr 0.00 - 17.00 mg/g Cr 144.02 (H)   (H): Data is abnormally high   Latest Reference Range & Units 04/04/24 07:57   Albumin Urine mg/L mg/L 106.0     REVIEW OF SYSTEMS    Endocrine: positive for thyroid disorder and diabetes  Skin: positive for itching, bruising, lumps or bumps  Eyes: positive for visual blurring, negative for, redness, tearing  Ears/Nose/Throat: negative  Respiratory: No shortness of breath, dyspnea on exertion, cough, or hemoptysis  Cardiovascular: negative for, chest pain, dyspnea on exertion, lower extremity edema, and exercise intolerance  Gastrointestinal: negative for, nausea, vomiting, constipation, and diarrhea  Genitourinary: positive for frequency and urgency, negative for, nocturia, and dysuria  Musculoskeletal: positive for muscular weakness, negative for, nocturnal cramping, and foot pain  Neurologic: positive for local weakness, numbness or tingling of feet, and incoordination, negative for, and numbness or tingling of hands  Psychiatric: negative  Hematologic/Lymphatic/Immunologic: negative    Past Medical History  Past Medical History:   Diagnosis Date     Asthma      Diabetes mellitus (H)      Neuropathy      RA (rheumatoid arthritis) (H)        Medications  Current Outpatient Medications   Medication Sig Dispense Refill     ASPIRIN LOW DOSE 81 MG chewable tablet CHEW AND SWALLOW ONE TABLET BY MOUTH EVERY DAY       clindamycin (CLEOCIN) 75 MG/5ML solution        clopidogrel (PLAVIX) 75 MG tablet TAKE ONE TABLET BY MOUTH EVERY DAY 60 tablet 0      Continuous Blood Gluc  (DEXCOM G7 ) GUY Use to read blood sugars as per 's instructions. 1 each 0     Continuous Glucose Sensor (DEXCOM G7 SENSOR) MISC Change every 10 days. 3 each 5     empagliflozin (JARDIANCE) 25 MG TABS tablet Take 25 mg by mouth daily       fluticasone (FLOVENT HFA) 220 MCG/ACT inhaler Inhale 1 puff into the lungs 2 times daily 30 g 0     furosemide (LASIX) 20 MG tablet Take 20 mg by mouth daily       gabapentin (NEURONTIN) 100 MG capsule TAKE ONE CAPSULE BY MOUTH EVERY DAY AT BEDTIME FOR 3 DAYS, 2 CAPS FOR 3 DAYS, THEN 3 CAPS AT BEDTIME 90 capsule 0     insulin aspart (NOVOLOG FLEXPEN) 100 UNIT/ML injection Inject subcutaneously 3 times daily (with meals).       Insulin Disposable Pump (OMNIPOD 5 G7 INTRO, GEN 5,) KIT 1 pod every 3 days. (Or when 200 units of insulin has been used). See  administration instructions. 1 kit 0     Insulin Disposable Pump (OMNIPOD 5 G7 PODS, GEN 5,) MISC 1 pod every 3 days. (Or when 200 units of insulin has been used).  See  administration instructions. 10 each 5     levothyroxine (SYNTHROID/LEVOTHROID) 50 MCG tablet Take 50 mcg by mouth       lisinopril (PRINIVIL/ZESTRIL) 5 MG tablet Take 5 mg by mouth       methocarbamol (ROBAXIN) 500 MG tablet Take 500 mg by mouth       metoprolol succinate ER (TOPROL XL) 25 MG 24 hr tablet Take 25 mg by mouth daily       nitroGLYcerin (NITROSTAT) 0.4 MG sublingual tablet Place 0.4 mg under the tongue       omeprazole (PRILOSEC) 20 MG DR capsule Take 2 capsules (40 mg) by mouth daily 30 capsule 3     ondansetron (ZOFRAN ODT) 4 MG ODT tab Take 1 tablet (4 mg) by mouth every 8 hours as needed for nausea 10 tablet 0     Pregabalin (LYRICA PO)        rosuvastatin (CRESTOR) 20 MG tablet Take 20 mg by mouth at bedtime       rosuvastatin (CRESTOR) 40 MG tablet Take 40 mg by mouth daily       semaglutide (OZEMPIC) 2 MG/3ML pen Inject 0.5 mg subcutaneously every 7 days. (Patient  taking differently: Inject 0.25 mg subcutaneously every 7 days.) 3 mL 3     SEMGLEE, YFGN, 100 UNIT/ML SOPN Inject 60 Units subcutaneously daily.         Allergies  Allergies   Allergen Reactions     Cortisone      Penicillins      Statins Other (See Comments)     Side effect: myalgias. Ok with zocor only         Family History  family history includes Glaucoma in an other family member; Seizure Disorder in his mother.    Social History  Social History     Tobacco Use     Smoking status: Never     Passive exposure: Never     Smokeless tobacco: Never     Tobacco comments:     NA   Vaping Use     Vaping status: Never Used   Substance Use Topics     Alcohol use: No     Drug use: No       Physical Exam  /74   Pulse 78   Resp 16   Wt 82.1 kg (181 lb)   SpO2 96%   BMI 29.66 kg/m    Body mass index is 29.66 kg/m .  GENERAL :  In no apparent distress. He is accompanied by his friend who also helps with his medications.  SKIN: Normal color, normal temperature, texture.  No hirsutism, alopecia or purple striae.     EYES: PERRL, EOMI, No scleral icterus,  No proptosis, conjunctival redness, stare, retraction  RESP: Lungs clear to auscultation bilaterally  CARDIAC: Regular rate and rhythm, normal S1 S2, without murmurs, rubs or gallops    NEURO: awake, alert, responds appropriately to questions.  Cranial nerves intact.   Moves all extremities; Gait normal.  No tremor of the outstretched hand.    EXTREMITIES: No clubbing, cyanosis or edema.    DATA REVIEW  He has not been monitoring his blood sugars        Again, thank you for allowing me to participate in the care of your patient.        Sincerely,        Ely Tran PA-C    Electronically signed

## 2025-04-14 ENCOUNTER — ANCILLARY PROCEDURE (OUTPATIENT)
Dept: GENERAL RADIOLOGY | Facility: CLINIC | Age: 50
End: 2025-04-14
Attending: NURSE PRACTITIONER
Payer: MEDICAID

## 2025-04-14 ENCOUNTER — OFFICE VISIT (OUTPATIENT)
Dept: FAMILY MEDICINE | Facility: CLINIC | Age: 50
End: 2025-04-14
Payer: MEDICAID

## 2025-04-14 ENCOUNTER — ALLIED HEALTH/NURSE VISIT (OUTPATIENT)
Dept: EDUCATION SERVICES | Facility: CLINIC | Age: 50
End: 2025-04-14
Attending: PHYSICIAN ASSISTANT
Payer: MEDICAID

## 2025-04-14 VITALS
RESPIRATION RATE: 12 BRPM | BODY MASS INDEX: 29.28 KG/M2 | OXYGEN SATURATION: 98 % | SYSTOLIC BLOOD PRESSURE: 139 MMHG | WEIGHT: 182.2 LBS | HEIGHT: 66 IN | TEMPERATURE: 98.3 F | DIASTOLIC BLOOD PRESSURE: 87 MMHG | HEART RATE: 90 BPM

## 2025-04-14 DIAGNOSIS — R07.9 CHEST PAIN, UNSPECIFIED TYPE: ICD-10-CM

## 2025-04-14 DIAGNOSIS — G89.29 CHRONIC PAIN OF RIGHT HIP: ICD-10-CM

## 2025-04-14 DIAGNOSIS — E11.40 TYPE 2 DIABETES MELLITUS WITH DIABETIC NEUROPATHY, WITH LONG-TERM CURRENT USE OF INSULIN (H): ICD-10-CM

## 2025-04-14 DIAGNOSIS — Z79.4 TYPE 2 DIABETES MELLITUS WITH DIABETIC NEUROPATHY, WITH LONG-TERM CURRENT USE OF INSULIN (H): ICD-10-CM

## 2025-04-14 DIAGNOSIS — M25.551 CHRONIC PAIN OF RIGHT HIP: ICD-10-CM

## 2025-04-14 DIAGNOSIS — I25.10 CORONARY ARTERY DISEASE INVOLVING NATIVE CORONARY ARTERY OF NATIVE HEART WITHOUT ANGINA PECTORIS: ICD-10-CM

## 2025-04-14 DIAGNOSIS — M71.21 SYNOVIAL CYST OF RIGHT POPLITEAL SPACE: Primary | ICD-10-CM

## 2025-04-14 LAB
CHOLEST SERPL-MCNC: 153 MG/DL
FASTING STATUS PATIENT QL REPORTED: YES
HDLC SERPL-MCNC: 45 MG/DL
LDLC SERPL CALC-MCNC: 89 MG/DL
NONHDLC SERPL-MCNC: 108 MG/DL
TRIGL SERPL-MCNC: 95 MG/DL

## 2025-04-14 PROCEDURE — G0108 DIAB MANAGE TRN  PER INDIV: HCPCS

## 2025-04-14 PROCEDURE — G2211 COMPLEX E/M VISIT ADD ON: HCPCS | Performed by: NURSE PRACTITIONER

## 2025-04-14 PROCEDURE — 36415 COLL VENOUS BLD VENIPUNCTURE: CPT | Performed by: NURSE PRACTITIONER

## 2025-04-14 PROCEDURE — 3075F SYST BP GE 130 - 139MM HG: CPT | Performed by: NURSE PRACTITIONER

## 2025-04-14 PROCEDURE — 80061 LIPID PANEL: CPT | Performed by: NURSE PRACTITIONER

## 2025-04-14 PROCEDURE — 99215 OFFICE O/P EST HI 40 MIN: CPT | Performed by: NURSE PRACTITIONER

## 2025-04-14 PROCEDURE — 73502 X-RAY EXAM HIP UNI 2-3 VIEWS: CPT | Mod: TC | Performed by: RADIOLOGY

## 2025-04-14 PROCEDURE — 3079F DIAST BP 80-89 MM HG: CPT | Performed by: NURSE PRACTITIONER

## 2025-04-14 RX ORDER — CELECOXIB 100 MG/1
100 CAPSULE ORAL 2 TIMES DAILY
Qty: 20 CAPSULE | Refills: 0 | Status: SHIPPED | OUTPATIENT
Start: 2025-04-14

## 2025-04-14 ASSESSMENT — ASTHMA QUESTIONNAIRES
QUESTION_3 LAST FOUR WEEKS HOW OFTEN DID YOUR ASTHMA SYMPTOMS (WHEEZING, COUGHING, SHORTNESS OF BREATH, CHEST TIGHTNESS OR PAIN) WAKE YOU UP AT NIGHT OR EARLIER THAN USUAL IN THE MORNING: NOT AT ALL
QUESTION_5 LAST FOUR WEEKS HOW WOULD YOU RATE YOUR ASTHMA CONTROL: COMPLETELY CONTROLLED
QUESTION_4 LAST FOUR WEEKS HOW OFTEN HAVE YOU USED YOUR RESCUE INHALER OR NEBULIZER MEDICATION (SUCH AS ALBUTEROL): NOT AT ALL
QUESTION_2 LAST FOUR WEEKS HOW OFTEN HAVE YOU HAD SHORTNESS OF BREATH: NOT AT ALL
ACT_TOTALSCORE: 25
ACT_TOTALSCORE: 25
QUESTION_1 LAST FOUR WEEKS HOW MUCH OF THE TIME DID YOUR ASTHMA KEEP YOU FROM GETTING AS MUCH DONE AT WORK, SCHOOL OR AT HOME: NONE OF THE TIME

## 2025-04-14 NOTE — PATIENT INSTRUCTIONS
Diabetes Support Resources:  Semglee take 60 unit(s) daily  Jardiance 25 mg daily   Ozempic 0.25 mg X 2 wks then increase to 0.5 mg weekly  Will [facility]  Follow up with CDE in [place] , Tanna Choi.  RD/CDE  Omni [facility] will contact you to get you trained.   Drinking plenty of fluids  Do not skip meals 3 meals with consistency.  Every 4-5 hrs and having snacks in between.       Bring blood glucose meter and logbook with you to all doctor and follow-up appointments.    Diabetes Education Telephone Visit Follow-up:    We realize your time is valuable and your health is important! We offer a convenient Telephone Visit follow up! It s a quick way to check in for a medication dose adjustment without having to come back to clinic as soon.    Telephone Visits are often covered by insurance. Please check with your insurance plan to see if this type of visit is covered. If not, the cost is less expensive than an office visit:    Up to 10 minutes (Code 52168): $30  11-20 minutes (Code 96347): $59  More than 20 minutes (Code 77817): $85    Talk with your Diabetes Educator if you want to learn more.      [facility]:  For Your Diabetes Education and Nutrition Appointments Call:  [phone]   For Diabetes Education or Nutrition Related Questions:   Phone: [phone]  Send [employer] Message   If you need a medication refill please contact your pharmacy. Please allow 3 business days for your refills to be completed.

## 2025-04-14 NOTE — ASSESSMENT & PLAN NOTE
Comment: 49-year-old male with multiple complaints following recent ER visits for chest pain (determined non-cardiac) and and complex medical history including poorly controlled diabetes and previous MIs complicating clinical picture.  PLAN:   Orders:    Lipid panel reflex to direct LDL Non-fasting; Future

## 2025-04-14 NOTE — LETTER
4/14/2025         RE: Israel Brown  [address]  [facility] [address]        Dear Colleague,    Thank you for referring your patient, Israel Brown, to the [facility]. Please see a copy of my visit note below.    Diabetes Self-Management Education & Support    Presents for: Individual review    Type of Service: In Person Visit      Assessment  Israel comes today very tired has not been taking his medication for about a couple of months blood sugars are gone high when I asked him why he was here today his response was my doctor made me come he picked up the OmniPod prescription that was ordered for him and not sure if he thought I was going to teach him the OmniPod today but he did not bring it with him he has no insulin at this time that has been ordered for him so I sent a message to get the insulin ordered I contacted [employer] representative for training for him as I do not train for the OmniPod in about 15 minutes into our session he kept yawning and falling asleep he does not eat very well I asked him about carb counting he says he knows how to do it yet when I challenged him on the carb counting he was unable to perform the carb counting I talked to him about eating healthy said he knew how to do it all and yet when I discussed some of the meal planning not quite sure he understood what we were talking about but again he kept trying to fall asleep so we did end our session.  I encouraged him to follow-up with Dr. Ely Tran which she has an appointment with and encouraged him to follow-up with the certified diabetic educator in [place] [employer] should be doing the training and following up with educator in [place] he left saying he would do all of the above I did inform Ely Tran about our appointment today he was more concerned about going home and getting some rest    Patient's most recent   Lab Results   Component Value Date    A1C >15.0 04/03/2025    A1C  12.3 04/04/2024    A1C 9.6 01/24/2018     is not meeting goal of <7.0    Diabetes knowledge and skills assessment:   Patient is knowledgeable in diabetes management concepts related to: Healthy Eating, Being Active, Monitoring, Taking Medication, Problem Solving, Reducing Risks, and Healthy Coping    Based on learning assessment above, most appropriate setting for further diabetes education would be: Individual setting.    Care Plan and Education Provided:  Healthy Eating: Balanced meals, Carbohydrate Counting, Consistency in amount and timing of carbohydrate intake, Label reading, Plate planning method, and Portion control, Being Active: Finding a physical activity routine that works for you, Monitoring: Blood glucose versus Continuous Glucose Monitoring, Frequency of monitoring, Individual glucose targets, Log and interpret results, Purpose, Proper technique, and Proper sharps disposal, and Taking Medication: Action of prescribed medication(s), Administering and storing injectable diabetes medications, Proper site selection and rotation for injections, Side effects of prescribed medication(s), and When to take medication(s)    Patient verbalized understanding of diabetes self-management education concepts discussed, opportunities for ongoing education and support, and recommendations provided today.    Plan  Semglee take 60 unit(s) daily  Jardiance 25 mg daily  Ozempic 0.25 mg X 2 wks then increase to 0.5 mg weekly  Will have Ely Tran order your insulin for you to  @ [facility] [place]  Follow up with CDE in [place].  RD/CDE  Om[facility] will contact you to get you trained.   Drinking plenty of fluids  Do not skip meals 3 meals with consistency.  Every 4-5 hrs and having snacks in between.      Topics to cover at upcoming visits: Healthy Eating, Monitoring, and Taking Medication    Follow-up:  Upcoming Diabetes Ed Appointments     Visit Type Date Time Department    DIABETES ED  "4/14/2025  3:00 PM [facility] DIABETES ED        See Care Plan for co-developed, patient-state behavior change goals.    Education Materials Provided:  - [facility] Understanding Diabetes Booklet   - My Plate Planner   -- [facility] Understanding Diabetes Booklet  -- Carbohydrate Counting  -- My Plate Planner   -- 100-Calorie Snack Ideas  -- Six Small Meals a Day  -- Healthy Snacks for your Meal Plan      Subjective/Objective  Israel is an 49 year old year old, presenting for the following diabetes education related to: Presents for: Individual review  Accompanied by: Self  Diabetes education in the past 24mo: No  Focus of Visit: Taking Medication, Diabetes Pathophysiology, Healthy Eating  Diabetes type: Type 2  Disease course: Getting harder to manage  Transportation concerns: No  Difficulty affording diabetes medication?: Sometimes  Difficulty affording diabetes testing supplies?: Sometimes  Other concerns:: None  Cultural Influences/Ethnic Background:  Not  or       Diabetes Symptoms & Complications:  Diabetes Related Symptoms: Fatigue, Polydipsia (increased thirst), Polyuria (increased urination), Slow healing wounds, Visual change  Weight trend: Decreasing  Symptom course: Worsening  Disease course: Getting harder to manage  Complications assessed today?: Yes  Heart disease: Yes    Patient Problem List and Family Medical History reviewed for relevant medical history, current medical status, and diabetes risk factors.    Vitals:  There were no vitals taken for this visit.  Estimated body mass index is 29.85 kg/m  as calculated from the following:    Height as of an earlier encounter on 4/14/25: 1.664 m (5' 5.51\").    Weight as of an earlier encounter on 4/14/25: 82.6 kg (182 lb 3.2 oz).   Last 3 BP:   BP Readings from Last 3 Encounters:   04/14/25 139/87   04/03/25 107/74   11/25/24 125/84       History   Smoking Status     Never   Smokeless Tobacco     Never       Labs:  Lab Results " "  Component Value Date    A1C >15.0 04/03/2025    A1C 12.3 04/04/2024    A1C 9.6 01/24/2018     Lab Results   Component Value Date     04/03/2025     03/24/2021     Lab Results   Component Value Date    LDL 89 04/14/2025     Direct Measure HDL   Date Value Ref Range Status   04/14/2025 45 >=40 mg/dL Final   ]  GFR Estimate   Date Value Ref Range Status   04/03/2025 >90 >60 mL/min/1.73m2 Final     Comment:     eGFR calculated using 2021 CKD-EPI equation.   03/24/2021 >90 >60 mL/min/[1.73_m2] Final     Comment:     Non  GFR Calc  Starting 12/18/2018, serum creatinine based estimated GFR (eGFR) will be   calculated using the Chronic Kidney Disease Epidemiology Collaboration   (CKD-EPI) equation.       GFR Estimate If Black   Date Value Ref Range Status   03/24/2021 >90 >60 mL/min/[1.73_m2] Final     Comment:      GFR Calc  Starting 12/18/2018, serum creatinine based estimated GFR (eGFR) will be   calculated using the Chronic Kidney Disease Epidemiology Collaboration   (CKD-EPI) equation.       Lab Results   Component Value Date    CR 0.93 04/03/2025    CR 0.89 03/24/2021     No results found for: \"MICROALBUMIN\"    Healthy Eating:  Healthy Eating Assessed Today: Yes  Cultural/Mormon diet restrictions?: No  Do you have any food allergies or intolerances?: No  Meal planning/habits: None  Who cooks/prepares meals for you?: Self  Who purchases food in  your home?: Self  Breakfast: may eat, eggs , thomas and toast, today up at 8:15, drank water, and no breakfast,  Lunch: today sandwich, turkey ,  Dinner: at pork roast and some peas.  Beverages: Water  Has patient met with a dietitian in the past?: No    Being Active:  Being Active Assessed Today: Yes  Exercise:: Currently not exercising  Barrier to exercise: Physical limitation    Monitoring:  Times checking blood sugar at home (number): 5+  Times checking blood sugar at home (per): Day  Blood glucose trend: " Decreasing                Taking Medications:  Diabetes Medication(s)       Insulin       insulin aspart (NOVOLOG FLEXPEN) 100 UNIT/ML injection Inject subcutaneously 3 times daily (with meals).     SEMGLEE, YFGN, 100 UNIT/ML SOPN Inject 60 Units subcutaneously daily.       Sodium-Glucose Co-Transporter 2 (SGLT2) Inhibitors       empagliflozin (JARDIANCE) 25 MG TABS tablet Take 25 mg by mouth daily       Incretin Mimetic Agents       semaglutide (OZEMPIC) 2 MG/3ML pen Inject 0.5 mg subcutaneously every 7 days.          Taking Medication Assessed Today: Yes  Current Treatments: Insulin Injections, Non-insulin Injectables, Oral Medication (taken by mouth)  Given by: Patient  Problems taking diabetes medications regularly?: Yes  Diabetes medication side effects?: No    Juanita Darling RN  Time Spent: 60 minutes  Encounter Type: Individual    Any diabetes medication dose changes were made via the CDCES Standing Orders under the patient's referring provider.

## 2025-04-14 NOTE — ASSESSMENT & PLAN NOTE
Comment: Poorly controlled.  Plan:  - He was recently restarted previous diabetic medications  -Schedule follow-up A1c in 3 months and follow-up with your endocrinology    42 minutes spent by me on the date of the encounter doing chart review, patient visit, and documentation

## 2025-04-14 NOTE — PATIENT INSTRUCTIONS
Based on our discussion, I have outlined the following instructions for you:      - Get an X-ray of your hip to check for osteoarthritis   - You can take Celebrex for pain relief or do hip exercises if you prefer.  - Keep an eye on any chest pain and call your heart doctor if you feel any symptoms.  - Remember, your next heart doctor appointment is in July.  - See an orthopedic specialist to check your cyst and discuss possible treatment.  - Rest your leg, use ice packs, keep it elevated, and wrap it with a bandage for support.  - Watch out for any chest pain coming back and contact your heart doctor if it does.    Thank you again for your visit, and we look forward to supporting you in your journey to better health.

## 2025-04-14 NOTE — PROGRESS NOTES
Diabetes Self-Management Education & Support    Presents for: Individual review    Type of Service: In Person Visit      Assessment  Israel comes today very tired has not been taking his medication for about a couple of months blood sugars are gone high when I asked him why he was here today his response was my doctor made me come he picked up the Om[employer] prescription that was ordered for him and not sure if he thought I was going to teach him the OmniPod today but he did not bring it with him he has no insulin at this time that has been ordered for him so I sent a message to get the insulin ordered I contacted [employer] representative for training for him as I do not train for the OmniPod in about 15 minutes into our session he kept yawning and falling asleep he does not eat very well I asked him about carb counting he says he knows how to do it yet when I challenged him on the carb counting he was unable to perform the carb counting I talked to him about eating healthy said he knew how to do it all and yet when I discussed some of the meal planning not quite sure he understood what we were talking about but again he kept trying to fall asleep so we did end our session.  I encouraged him to follow-up with Dr. Ely Tran which she has an appointment with and encouraged him to follow-up with the certified diabetic educator in [place] [employer] should be doing the training and following up with educator in [place] he left saying he would do all of the above I did inform Ely Tran about our appointment today he was more concerned about going home and getting some rest    Patient's most recent   Lab Results   Component Value Date    A1C >15.0 04/03/2025    A1C 12.3 04/04/2024    A1C 9.6 01/24/2018     is not meeting goal of <7.0    Diabetes knowledge and skills assessment:   Patient is knowledgeable in diabetes management concepts related to: Healthy Eating, Being Active, Monitoring, Taking Medication, Problem  Solving, Reducing Risks, and Healthy Coping    Based on learning assessment above, most appropriate setting for further diabetes education would be: Individual setting.    Care Plan and Education Provided:  Healthy Eating: Balanced meals, Carbohydrate Counting, Consistency in amount and timing of carbohydrate intake, Label reading, Plate planning method, and Portion control, Being Active: Finding a physical activity routine that works for you, Monitoring: Blood glucose versus Continuous Glucose Monitoring, Frequency of monitoring, Individual glucose targets, Log and interpret results, Purpose, Proper technique, and Proper sharps disposal, and Taking Medication: Action of prescribed medication(s), Administering and storing injectable diabetes medications, Proper site selection and rotation for injections, Side effects of prescribed medication(s), and When to take medication(s)    Patient verbalized understanding of diabetes self-management education concepts discussed, opportunities for ongoing education and support, and recommendations provided today.    Plan  Semglee take 60 unit(s) daily  Jardiance 25 mg daily  Ozempic 0.25 mg X 2 wks then increase to 0.5 mg weekly  Will [facility]ly Tr[facility]  Follow up with CDE in [place] , Tanna Choi.  RD/CDE  [employer] will contact you to get you trained.   Drinking plenty of fluids  Do not skip meals 3 meals with consistency.  Every 4-5 hrs and having snacks in between.      Topics to cover at upcoming visits: Healthy Eating, Monitoring, and Taking Medication    Follow-up:  Upcoming Diabetes Ed Appointments     Visit Type Date Time Department    DIABETES ED 4/14/2025  3:00 PM [facility] DIABETES ED        See Care Plan for co-developed, patient-state behavior change goals.    Education Materials Provided:  - [facility] Understanding Diabetes Booklet   - My Plate Planner   -- [facility] Understanding Diabetes  "Booklet  -- Carbohydrate Counting  -- My Plate Planner   -- 100-Calorie Snack Ideas  -- Six Small Meals a Day  -- Healthy Snacks for your Meal Plan      Subjective/Objective  Israel is an 49 year old year old, presenting for the following diabetes education related to: Presents for: Individual review  Accompanied by: Self  Diabetes education in the past 24mo: No  Focus of Visit: Taking Medication, Diabetes Pathophysiology, Healthy Eating  Diabetes type: Type 2  Disease course: Getting harder to manage  Transportation concerns: No  Difficulty affording diabetes medication?: Sometimes  Difficulty affording diabetes testing supplies?: Sometimes  Other concerns:: None  Cultural Influences/Ethnic Background:  Not  or       Diabetes Symptoms & Complications:  Diabetes Related Symptoms: Fatigue, Polydipsia (increased thirst), Polyuria (increased urination), Slow healing wounds, Visual change  Weight trend: Decreasing  Symptom course: Worsening  Disease course: Getting harder to manage  Complications assessed today?: Yes  Heart disease: Yes    Patient Problem List and Family Medical History reviewed for relevant medical history, current medical status, and diabetes risk factors.    Vitals:  There were no vitals taken for this visit.  Estimated body mass index is 29.85 kg/m  as calculated from the following:    Height as of an earlier encounter on 4/14/25: 1.664 m (5' 5.51\").    Weight as of an earlier encounter on 4/14/25: 82.6 kg (182 lb 3.2 oz).   Last 3 BP:   BP Readings from Last 3 Encounters:   04/14/25 139/87   04/03/25 107/74   11/25/24 125/84       History   Smoking Status    Never   Smokeless Tobacco    Never       Labs:  Lab Results   Component Value Date    A1C >15.0 04/03/2025    A1C 12.3 04/04/2024    A1C 9.6 01/24/2018     Lab Results   Component Value Date     04/03/2025     03/24/2021     Lab Results   Component Value Date    LDL 89 04/14/2025     Direct Measure HDL   Date Value Ref " "Range Status   04/14/2025 45 >=40 mg/dL Final   ]  GFR Estimate   Date Value Ref Range Status   04/03/2025 >90 >60 mL/min/1.73m2 Final     Comment:     eGFR calculated using 2021 CKD-EPI equation.   03/24/2021 >90 >60 mL/min/[1.73_m2] Final     Comment:     Non  GFR Calc  Starting 12/18/2018, serum creatinine based estimated GFR (eGFR) will be   calculated using the Chronic Kidney Disease Epidemiology Collaboration   (CKD-EPI) equation.       GFR Estimate If Black   Date Value Ref Range Status   03/24/2021 >90 >60 mL/min/[1.73_m2] Final     Comment:      GFR Calc  Starting 12/18/2018, serum creatinine based estimated GFR (eGFR) will be   calculated using the Chronic Kidney Disease Epidemiology Collaboration   (CKD-EPI) equation.       Lab Results   Component Value Date    CR 0.93 04/03/2025    CR 0.89 03/24/2021     No results found for: \"MICROALBUMIN\"    Healthy Eating:  Healthy Eating Assessed Today: Yes  Cultural/Church diet restrictions?: No  Do you have any food allergies or intolerances?: No  Meal planning/habits: None  Who cooks/prepares meals for you?: Self  Who purchases food in  your home?: Self  Breakfast: may eat, eggs , thomas and toast, today up at 8:15, drank water, and no breakfast,  Lunch: today sandwich, turkey ,  Dinner: at pork roast and some peas.  Beverages: Water  Has patient met with a dietitian in the past?: No    Being Active:  Being Active Assessed Today: Yes  Exercise:: Currently not exercising  Barrier to exercise: Physical limitation    Monitoring:  Times checking blood sugar at home (number): 5+  Times checking blood sugar at home (per): Day  Blood glucose trend: Decreasing                Taking Medications:  Diabetes Medication(s)       Insulin       insulin aspart (NOVOLOG FLEXPEN) 100 UNIT/ML injection Inject subcutaneously 3 times daily (with meals).     SEMGLEE, YFGN, 100 UNIT/ML SOPN Inject 60 Units subcutaneously daily.       Sodium-Glucose " Co-Transporter 2 (SGLT2) Inhibitors       empagliflozin (JARDIANCE) 25 MG TABS tablet Take 25 mg by mouth daily       Incretin Mimetic Agents       semaglutide (OZEMPIC) 2 MG/3ML pen Inject 0.5 mg subcutaneously every 7 days.          Taking Medication Assessed Today: Yes  Current Treatments: Insulin Injections, Non-insulin Injectables, Oral Medication (taken by mouth)  Given by: Patient  Problems taking diabetes medications regularly?: Yes  Diabetes medication side effects?: No    Juanita Darling RN  Time Spent: 60 minutes  Encounter Type: Individual    Any diabetes medication dose changes were made via the CDCES Standing Orders under the patient's referring provider.

## 2025-04-14 NOTE — PROGRESS NOTES
Assessment & Plan  Synovial cyst of right popliteal space  Comment: 49-year-old male with complaints following recent ER visits for ongoing musculoskeletal issues including right Baker's cyst and hip pain following workplace fall.   PLAN:   Orders:    Orthopedic  Referral; Future    celecoxib (CELEBREX) 100 MG capsule; Take 1 capsule (100 mg) by mouth 2 times daily.    Chronic pain of right hip  Comment: Likely osteoarthritis.Pain worsens when sitting for prolonged periods.  Orders:    XR Hip Right 2-3 Views; Future    celecoxib (CELEBREX) 100 MG capsule; Take 1 capsule (100 mg) by mouth 2 times daily.  - rest, intermittent application of heat (do not sleep on heating pad),  -.Consider Physical Therapy   - Call or return to clinic prn if these symptoms worsen or fail to improve as anticipated.   Coronary artery disease involving native coronary artery of native heart without angina pectoris  Comment: 49-year-old male with multiple complaints following recent ER visits for chest pain (determined non-cardiac) and and complex medical history including poorly controlled diabetes and previous MIs complicating clinical picture.  PLAN:   Orders:    Lipid panel reflex to direct LDL Non-fasting; Future    Chest pain, unspecified type  Comment: Resolved  Plan:  - Continue cardiac risk modification  - Follow up with cardiology for recent ED visits  -Monitor for any new or worsening symptoms       Type 2 diabetes mellitus with diabetic neuropathy, with long-term current use of insulin (H)  Comment: Poorly controlled.  Plan:  - He was recently restarted previous diabetic medications  -Schedule follow-up A1c in 3 months and follow-up with your endocrinology    42 minutes spent by me on the date of the encounter doing chart review, patient visit, and documentation           Subjective   Israel is a 49 year old, presenting for the following health issues:  ER F/U        4/14/2025    10:01 AM   Additional Questions   Roomed  by Floridalma WICK CMA         4/14/2025    10:01 AM   Patient Reported Additional Medications   Patient reports taking the following new medications None       ED/UC Followup:    Facility:  [facility] Emergency Department   Date of visit: 3/31/25  Reason for visit:   1. Chest pain, unspecified type R07.9   2. Synovial cyst of right popliteal space M71.21   Current Status: Still having chest pain that comes at goes, right side of ribs hurt; still having knee pain      HPI Israel Brown, a 49-year-old male, presented with ER visit follow-up and concerns of chest pain, leg pain, and hip pain.    Chest Pain:   Israel visited the emergency room twice, on March 29 and March 31, for chest pain. The pain was described as pleuritic in nature, and he was informed that it was not cardiac-related. He did not experience any cough at the time of the visits. Cardiac enzymes and D-dimer tests were performed, both returning negative results. An X-ray showed no signs of pneumonia or other abnormalities. He has a history of non-ST elevation myocardial infarction (non-STEMI) and has had two heart attacks in the past. He attributes part of the chest pain to his diabetes, which he is currently trying to manage.    Leg Pain and Baker's Cyst:   Israel reports persistent leg pain, with one leg being smaller than the other since birth. He has a Baker's cyst in the right popliteal space, which was identified during an ultrasound.  He experiences pain in the area, which intensifies with movement, reaching a pain level of 8 out of 10. He has a history of knee trauma. 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 Since then, he has had difficulty walking properly.    Diabetes Management:   Israel's diabetes is currently not well-controlled, with his A1c levels being high. He lost his health insurance at the beginning of the year, which led to a lapse  "in taking his prescribed medications. He is now back on his medications after obtaining [facility].  Currently, his diabetes is managed by endocrinologist.    Hip Pain:   Israel reports right hip pain that began around the same time as his leg issues, following the fall in December. The pain is more pronounced when sitting for extended periods. He does not recall any specific trauma to the hip.  Denies any injuries.No Locking/Catching. No Snapping.Worsens with ambulation.  No Prior Injuries/Surgeries.           Review of Systems  Constitutional, HEENT, cardiovascular, pulmonary, GI, , musculoskeletal, neuro, skin, endocrine and psych systems are negative, except as otherwise noted.      Objective    /87   Pulse 90   Temp 98.3  F (36.8  C) (Temporal)   Resp 12   Ht 1.664 m (5' 5.51\")   Wt 82.6 kg (182 lb 3.2 oz)   SpO2 98%   BMI 29.85 kg/m    Body mass index is 29.85 kg/m .  Physical Exam   GENERAL: alert and no distress  NECK: no adenopathy, no asymmetry, masses, or scars  RESP: lungs clear to auscultation - no rales, rhonchi or wheezes  CV: regular rate and rhythm, normal S1 S2, no S3 or S4, no murmur, click or rub, no peripheral edema  ABDOMEN: soft, nontender, no hepatosplenomegaly, no masses and bowel sounds normal  MS:    - Right knee: Visible and palpable Baker's cyst in popliteal space, tender to palpation  - Hip: Pain with range of motion, no obvious deformity  SKIN: no suspicious lesions or rashes  NEURO: Normal strength and tone, mentation intact and speech normal  PSYCH: mentation appears normal, affect normal/bright            Signed Electronically by: IFEANYI Cerda CNP      I am utilizing AI dictation through [employer] to document the patient's history and physical examination. Please note that while the AI is designed to assist in capturing detailed information, there may be errors in the dictation. I will review and edit the content for accuracy before finalizing.     "

## 2025-04-15 ENCOUNTER — MYC REFILL (OUTPATIENT)
Dept: FAMILY MEDICINE | Facility: CLINIC | Age: 50
End: 2025-04-15
Payer: MEDICAID

## 2025-04-15 DIAGNOSIS — E11.40 TYPE 2 DIABETES MELLITUS WITH DIABETIC NEUROPATHY, WITH LONG-TERM CURRENT USE OF INSULIN (H): Primary | ICD-10-CM

## 2025-04-15 DIAGNOSIS — Z79.4 TYPE 2 DIABETES MELLITUS WITH DIABETIC NEUROPATHY, WITH LONG-TERM CURRENT USE OF INSULIN (H): Primary | ICD-10-CM

## 2025-04-15 DIAGNOSIS — K21.9 GASTROESOPHAGEAL REFLUX DISEASE, UNSPECIFIED WHETHER ESOPHAGITIS PRESENT: ICD-10-CM

## 2025-04-15 RX ORDER — INSULIN ASPART 100 [IU]/ML
INJECTION, SOLUTION INTRAVENOUS; SUBCUTANEOUS
Qty: 60 ML | Refills: 1 | Status: SHIPPED | OUTPATIENT
Start: 2025-04-15 | End: 2025-04-17

## 2025-04-16 RX ORDER — OMEPRAZOLE 20 MG/1
40 CAPSULE, DELAYED RELEASE ORAL DAILY
Qty: 90 CAPSULE | Refills: 1 | Status: SHIPPED | OUTPATIENT
Start: 2025-04-16

## 2025-04-17 NOTE — TELEPHONE ENCOUNTER
REASON FOR VISIT: synovial cyst of R popliteal space    DATE OF APPT: 4/22/2025   NOTES (FOR ALL VISITS) STATUS DETAILS   OFFICE NOTE from referring provider [facility] Lloyd Gonzalez APRN CNP 4/14/2025   EMG N/A    MEDICATION LIST N/A    IMAGING  (FOR ALL VISITS)     XR [facility] Ita  XR Hip right 4/14/2025   MRI (HEAD, NECK, SPINE) N/A    CT (HEAD, NECK, SPINE) N/A

## 2025-04-21 ENCOUNTER — MYC REFILL (OUTPATIENT)
Dept: ENDOCRINOLOGY | Facility: CLINIC | Age: 50
End: 2025-04-21
Payer: MEDICAID

## 2025-04-21 DIAGNOSIS — E11.40 TYPE 2 DIABETES MELLITUS WITH DIABETIC NEUROPATHY, WITH LONG-TERM CURRENT USE OF INSULIN (H): ICD-10-CM

## 2025-04-21 DIAGNOSIS — Z79.4 TYPE 2 DIABETES MELLITUS WITH DIABETIC NEUROPATHY, WITH LONG-TERM CURRENT USE OF INSULIN (H): ICD-10-CM

## 2025-04-22 ENCOUNTER — PRE VISIT (OUTPATIENT)
Dept: ORTHOPEDICS | Facility: CLINIC | Age: 50
End: 2025-04-22

## 2025-04-22 RX ORDER — INSULIN ASPART 100 [IU]/ML
INJECTION, SOLUTION INTRAVENOUS; SUBCUTANEOUS
Qty: 60 ML | Refills: 1 | Status: SHIPPED | OUTPATIENT
Start: 2025-04-22

## 2025-04-22 NOTE — DISCHARGE INSTRUCTIONS
Please make an appointment to follow up with Your Primary Care Provider as needed.    Ice to areas of pain.    Remain active but avoid strenuous activity or heavy lifting.    Tylenol and/or ibuprofen for pain.    Return to the emergency department for any problems.     [Dear  ___] : Dear  [unfilled], [Courtesy Letter:] : I had the pleasure of seeing your patient, [unfilled], in my office today. [Please see my note below.] : Please see my note below. [Consult Closing:] : Thank you very much for allowing me to participate in the care of this patient.  If you have any questions, please do not hesitate to contact me. [Sincerely,] : Sincerely, [FreeTextEntry3] : Constance Dumas MD Director of Robotic Education [facility] at [facility]   [email or web address] [phone]

## 2025-04-30 DIAGNOSIS — E11.40 TYPE 2 DIABETES MELLITUS WITH DIABETIC NEUROPATHY, WITH LONG-TERM CURRENT USE OF INSULIN (H): Primary | ICD-10-CM

## 2025-04-30 DIAGNOSIS — I10 HYPERTENSION, UNSPECIFIED TYPE: ICD-10-CM

## 2025-04-30 DIAGNOSIS — Z79.4 TYPE 2 DIABETES MELLITUS WITH DIABETIC NEUROPATHY, WITH LONG-TERM CURRENT USE OF INSULIN (H): Primary | ICD-10-CM

## 2025-04-30 DIAGNOSIS — E78.5 HYPERLIPIDEMIA, UNSPECIFIED HYPERLIPIDEMIA TYPE: Primary | ICD-10-CM

## 2025-04-30 RX ORDER — INSULIN GLARGINE-YFGN 100 [IU]/ML
60 INJECTION, SOLUTION SUBCUTANEOUS DAILY
Qty: 45 ML | Refills: 0 | Status: SHIPPED | OUTPATIENT
Start: 2025-04-30

## 2025-04-30 NOTE — TELEPHONE ENCOUNTER
Israel called requesting a refill of his basal insulin. I do see it was last reported from patient to be insulin glargine (SEMGLEE YABHIJIT). We do not have any prescriptions for this on file and [facility]. Please send an order for this medication if appropriate.     Thank you,  Cayla Gay, PharmD  [facility] ([facility])

## 2025-05-01 RX ORDER — METOPROLOL SUCCINATE 25 MG/1
25 TABLET, EXTENDED RELEASE ORAL
Qty: 90 TABLET | Refills: 0 | Status: SHIPPED | OUTPATIENT
Start: 2025-05-01

## 2025-05-01 RX ORDER — ROSUVASTATIN CALCIUM 20 MG/1
TABLET, COATED ORAL
Qty: 90 TABLET | Refills: 2 | Status: SHIPPED | OUTPATIENT
Start: 2025-05-01

## 2025-05-01 RX ORDER — LISINOPRIL 5 MG/1
5 TABLET ORAL
Qty: 90 TABLET | Refills: 0 | Status: SHIPPED | OUTPATIENT
Start: 2025-05-01

## 2025-05-02 ENCOUNTER — TELEPHONE (OUTPATIENT)
Dept: FAMILY MEDICINE | Facility: CLINIC | Age: 50
End: 2025-05-02
Payer: MEDICAID

## 2025-05-02 DIAGNOSIS — Z79.4 TYPE 2 DIABETES MELLITUS WITH DIABETIC NEUROPATHY, WITH LONG-TERM CURRENT USE OF INSULIN (H): ICD-10-CM

## 2025-05-02 DIAGNOSIS — E11.40 TYPE 2 DIABETES MELLITUS WITH DIABETIC NEUROPATHY, WITH LONG-TERM CURRENT USE OF INSULIN (H): ICD-10-CM

## 2025-05-02 NOTE — TELEPHONE ENCOUNTER
Routing Message to provider..  Pharmacy calling to request change to insulin Semglee.    Insurance prefers Lantus brand.    Please addend prescription , remove dispense as written.    Pharmacy pended    Thank you.    Christine M Klisch, RN

## 2025-05-11 DIAGNOSIS — I25.10 CORONARY ARTERY DISEASE INVOLVING NATIVE CORONARY ARTERY OF NATIVE HEART WITHOUT ANGINA PECTORIS: ICD-10-CM

## 2025-05-12 RX ORDER — CLOPIDOGREL BISULFATE 75 MG/1
75 TABLET ORAL
Qty: 90 TABLET | Refills: 0 | Status: SHIPPED | OUTPATIENT
Start: 2025-05-12

## 2025-06-03 ENCOUNTER — TELEPHONE (OUTPATIENT)
Dept: ENDOCRINOLOGY | Facility: CLINIC | Age: 50
End: 2025-06-03
Payer: MEDICAID

## 2025-06-03 NOTE — TELEPHONE ENCOUNTER
PA Initiation    Medication: OZEMPIC (0.25 OR 0.5 MG/DOSE) 2 MG/3ML SC SOPN  Insurance Company: [facility] - Phone [phone] Fax [phone]  Pharmacy Filling the Rx: [facility] - [place], [place] - [address]  Filling Pharmacy Phone: [phone]  Filling Pharmacy Fax: [phone]  Start Date: 6/3/2025

## 2025-06-04 NOTE — ORAL ONC MGMT
PA Approved for Ozempic. Called pharmacy they stated he does not fill with them anymore. I will sent a [employer] message to have pt call his current pharmacy to fill

## 2025-06-04 NOTE — TELEPHONE ENCOUNTER
Prior Authorization Approval    Medication: OZEMPIC (0.25 OR 0.5 MG/DOSE) 2 MG/3ML SC SOPN  Authorization Effective Date: 5/1/2025  Authorization Expiration Date: 6/4/2026  Approved Dose/Quantity: 1 month  Reference #: [id number]   Insurance Company: [employer] - Phone [phone] Fax [phone]  Expected CoPay: $    CoPay Card Available:      Financial Assistance Needed:    Which Pharmacy is filling the prescription: [address]  Pharmacy Notified: pharmacy called  Patient Notified: [employer] message sent to pt

## 2025-06-07 PROBLEM — N17.9 AKI (ACUTE KIDNEY INJURY): Status: RESOLVED | Noted: 2024-02-05 | Resolved: 2025-06-07

## 2025-06-07 PROBLEM — R10.9 ABDOMINAL PAIN: Status: RESOLVED | Noted: 2017-08-19 | Resolved: 2025-06-07

## 2025-06-07 PROBLEM — E11.9 DIABETES MELLITUS, TYPE 2 (H): Status: RESOLVED | Noted: 2024-04-04 | Resolved: 2025-06-07

## 2025-06-07 PROBLEM — L03.90 CELLULITIS: Status: RESOLVED | Noted: 2019-01-12 | Resolved: 2025-06-07

## 2025-06-07 PROBLEM — I21.4 NON-STEMI (NON-ST ELEVATED MYOCARDIAL INFARCTION) (H): Status: RESOLVED | Noted: 2022-11-20 | Resolved: 2025-06-07

## 2025-06-16 ENCOUNTER — MYC MEDICAL ADVICE (OUTPATIENT)
Dept: ENDOCRINOLOGY | Facility: CLINIC | Age: 50
End: 2025-06-16
Payer: MEDICAID

## 2025-06-16 DIAGNOSIS — E11.40 TYPE 2 DIABETES MELLITUS WITH DIABETIC NEUROPATHY, WITH LONG-TERM CURRENT USE OF INSULIN (H): Primary | ICD-10-CM

## 2025-06-16 DIAGNOSIS — Z79.4 TYPE 2 DIABETES MELLITUS WITH DIABETIC NEUROPATHY, WITH LONG-TERM CURRENT USE OF INSULIN (H): Primary | ICD-10-CM

## 2025-06-16 RX ORDER — FLURBIPROFEN SODIUM 0.3 MG/ML
SOLUTION/ DROPS OPHTHALMIC
Qty: 350 EACH | Refills: 1 | Status: SHIPPED | OUTPATIENT
Start: 2025-06-16

## 2025-07-13 ENCOUNTER — HEALTH MAINTENANCE LETTER (OUTPATIENT)
Age: 50
End: 2025-07-13

## 2025-07-14 ENCOUNTER — MYC MEDICAL ADVICE (OUTPATIENT)
Dept: FAMILY MEDICINE | Facility: CLINIC | Age: 50
End: 2025-07-14
Payer: COMMERCIAL

## 2025-07-14 DIAGNOSIS — Z79.4 TYPE 2 DIABETES MELLITUS WITH DIABETIC NEUROPATHY, WITH LONG-TERM CURRENT USE OF INSULIN (H): ICD-10-CM

## 2025-07-14 DIAGNOSIS — E11.40 TYPE 2 DIABETES MELLITUS WITH DIABETIC NEUROPATHY, WITH LONG-TERM CURRENT USE OF INSULIN (H): ICD-10-CM

## 2025-07-15 NOTE — TELEPHONE ENCOUNTER
Last seen 4/14/2025   Takes Gabapentin 100mg daily per mychart messages. Teed up pharmacy.    Sammi Gill RN on 7/15/2025 at 5:27 PM

## 2025-07-16 RX ORDER — GABAPENTIN 100 MG/1
100 CAPSULE ORAL AT BEDTIME
Qty: 90 CAPSULE | Refills: 0 | Status: SHIPPED | OUTPATIENT
Start: 2025-07-16

## 2025-07-28 DIAGNOSIS — Z79.4 TYPE 2 DIABETES MELLITUS WITH DIABETIC NEUROPATHY, WITH LONG-TERM CURRENT USE OF INSULIN (H): ICD-10-CM

## 2025-07-28 DIAGNOSIS — E11.40 TYPE 2 DIABETES MELLITUS WITH DIABETIC NEUROPATHY, WITH LONG-TERM CURRENT USE OF INSULIN (H): ICD-10-CM

## 2025-07-29 DIAGNOSIS — E11.40 TYPE 2 DIABETES MELLITUS WITH DIABETIC NEUROPATHY, WITH LONG-TERM CURRENT USE OF INSULIN (H): ICD-10-CM

## 2025-07-29 DIAGNOSIS — Z79.4 TYPE 2 DIABETES MELLITUS WITH DIABETIC NEUROPATHY, WITH LONG-TERM CURRENT USE OF INSULIN (H): ICD-10-CM

## 2025-07-30 RX ORDER — SEMAGLUTIDE 0.68 MG/ML
INJECTION, SOLUTION SUBCUTANEOUS
Qty: 3 ML | Refills: 1 | OUTPATIENT
Start: 2025-07-30

## 2025-07-30 NOTE — TELEPHONE ENCOUNTER
semaglutide (OZEMPIC) 2 MG/3ML pen 3 mL 3 7/29/2025     Should have refills on file. Pharmacy   sent message. Rx refill denied    Patricia Figueroa RN  P Red Flag Triage/MRT

## 2025-08-13 ENCOUNTER — OFFICE VISIT (OUTPATIENT)
Dept: CARDIOLOGY | Facility: CLINIC | Age: 50
End: 2025-08-13
Attending: INTERNAL MEDICINE
Payer: COMMERCIAL

## 2025-08-13 VITALS
WEIGHT: 181 LBS | BODY MASS INDEX: 29.65 KG/M2 | OXYGEN SATURATION: 95 % | DIASTOLIC BLOOD PRESSURE: 67 MMHG | HEART RATE: 82 BPM | SYSTOLIC BLOOD PRESSURE: 100 MMHG

## 2025-08-13 DIAGNOSIS — I25.10 CORONARY ARTERY DISEASE INVOLVING NATIVE CORONARY ARTERY OF NATIVE HEART WITHOUT ANGINA PECTORIS: ICD-10-CM

## 2025-08-13 DIAGNOSIS — R29.6 RECURRENT FALLS: ICD-10-CM

## 2025-08-13 DIAGNOSIS — E11.9 TYPE 2 DIABETES, HBA1C GOAL < 7% (H): ICD-10-CM

## 2025-08-13 DIAGNOSIS — Z95.5 HISTORY OF PLACEMENT OF STENT IN LAD CORONARY ARTERY: ICD-10-CM

## 2025-08-13 DIAGNOSIS — I21.02 ST ELEVATION MYOCARDIAL INFARCTION INVOLVING LEFT ANTERIOR DESCENDING (LAD) CORONARY ARTERY (H): Primary | ICD-10-CM

## 2025-09-04 ENCOUNTER — LAB REQUISITION (OUTPATIENT)
Dept: LAB | Facility: CLINIC | Age: 50
End: 2025-09-04

## 2025-09-04 LAB — PA ADP BLD-ACNC: 67 PRU

## 2025-09-04 PROCEDURE — 85576 BLOOD PLATELET AGGREGATION: CPT

## (undated) RX ORDER — REGADENOSON 0.08 MG/ML
INJECTION, SOLUTION INTRAVENOUS
Status: DISPENSED
Start: 2018-01-24